# Patient Record
Sex: FEMALE | Race: WHITE | NOT HISPANIC OR LATINO | Employment: UNEMPLOYED | URBAN - METROPOLITAN AREA
[De-identification: names, ages, dates, MRNs, and addresses within clinical notes are randomized per-mention and may not be internally consistent; named-entity substitution may affect disease eponyms.]

---

## 2017-01-23 ENCOUNTER — ALLSCRIPTS OFFICE VISIT (OUTPATIENT)
Dept: OTHER | Facility: OTHER | Age: 62
End: 2017-01-23

## 2017-05-19 LAB
A/G RATIO (HISTORICAL): 1.8 (ref 1.2–2.2)
ALBUMIN SERPL BCP-MCNC: 4.2 G/DL (ref 3.5–5.5)
ALP SERPL-CCNC: 98 IU/L (ref 39–117)
ALT SERPL W P-5'-P-CCNC: 37 IU/L (ref 0–32)
AST SERPL W P-5'-P-CCNC: 30 IU/L (ref 0–40)
BILIRUB SERPL-MCNC: 0.4 MG/DL (ref 0–1.2)
BUN SERPL-MCNC: 20 MG/DL (ref 6–24)
BUN/CREA RATIO (HISTORICAL): 22 (ref 9–23)
CALCIUM SERPL-MCNC: 9.4 MG/DL (ref 8.7–10.2)
CHLORIDE SERPL-SCNC: 94 MMOL/L (ref 96–106)
CHOLEST SERPL-MCNC: 227 MG/DL (ref 100–199)
CHOLEST/HDLC SERPL: 3.3 RATIO UNITS (ref 0–4.4)
CO2 SERPL-SCNC: 25 MMOL/L (ref 18–29)
CREAT SERPL-MCNC: 0.93 MG/DL (ref 0.57–1)
EGFR AFRICAN AMERICAN (HISTORICAL): 78 ML/MIN/1.73
EGFR-AMERICAN CALC (HISTORICAL): 67 ML/MIN/1.73
GLUCOSE SERPL-MCNC: 91 MG/DL (ref 65–99)
HDLC SERPL-MCNC: 69 MG/DL
LDLC SERPL CALC-MCNC: 127 MG/DL (ref 0–99)
POTASSIUM SERPL-SCNC: 5 MMOL/L (ref 3.5–5.2)
SODIUM SERPL-SCNC: 135 MMOL/L (ref 134–144)
TOT. GLOBULIN, SERUM (HISTORICAL): 2.4 G/DL (ref 1.5–4.5)
TOTAL PROTEIN (HISTORICAL): 6.6 G/DL (ref 6–8.5)
TRIGL SERPL-MCNC: 153 MG/DL (ref 0–149)
VLDLC SERPL CALC-MCNC: 31 MG/DL (ref 5–40)

## 2017-05-23 ENCOUNTER — GENERIC CONVERSION - ENCOUNTER (OUTPATIENT)
Dept: OTHER | Facility: OTHER | Age: 62
End: 2017-05-23

## 2017-05-23 ENCOUNTER — ALLSCRIPTS OFFICE VISIT (OUTPATIENT)
Dept: OTHER | Facility: OTHER | Age: 62
End: 2017-05-23

## 2017-07-06 ENCOUNTER — HOSPITAL ENCOUNTER (OUTPATIENT)
Dept: RADIOLOGY | Age: 62
Discharge: HOME | End: 2017-07-06
Payer: COMMERCIAL

## 2017-07-06 DIAGNOSIS — Z12.31 ENCOUNTER FOR SCREENING MAMMOGRAM FOR MALIGNANT NEOPLASM OF BREAST: ICD-10-CM

## 2017-07-06 PROCEDURE — G0202 SCR MAMMO BI INCL CAD: HCPCS

## 2017-08-18 ENCOUNTER — GENERIC CONVERSION - ENCOUNTER (OUTPATIENT)
Dept: OTHER | Facility: OTHER | Age: 62
End: 2017-08-18

## 2017-08-29 ENCOUNTER — GENERIC CONVERSION - ENCOUNTER (OUTPATIENT)
Dept: OTHER | Facility: OTHER | Age: 62
End: 2017-08-29

## 2017-09-27 ENCOUNTER — GENERIC CONVERSION - ENCOUNTER (OUTPATIENT)
Dept: OTHER | Facility: OTHER | Age: 62
End: 2017-09-27

## 2017-10-10 ENCOUNTER — GENERIC CONVERSION - ENCOUNTER (OUTPATIENT)
Dept: FAMILY MEDICINE CLINIC | Facility: CLINIC | Age: 62
End: 2017-10-10

## 2017-10-10 ENCOUNTER — GENERIC CONVERSION - ENCOUNTER (OUTPATIENT)
Dept: OTHER | Facility: OTHER | Age: 62
End: 2017-10-10

## 2017-11-13 ENCOUNTER — GENERIC CONVERSION - ENCOUNTER (OUTPATIENT)
Dept: OTHER | Facility: OTHER | Age: 62
End: 2017-11-13

## 2017-11-21 LAB
A/G RATIO (HISTORICAL): 1.9 (ref 1.2–2.2)
ALBUMIN SERPL BCP-MCNC: 4.6 G/DL (ref 3.6–4.8)
ALP SERPL-CCNC: 75 IU/L (ref 39–117)
ALT SERPL W P-5'-P-CCNC: 35 IU/L (ref 0–32)
AST SERPL W P-5'-P-CCNC: 29 IU/L (ref 0–40)
BILIRUB SERPL-MCNC: 0.5 MG/DL (ref 0–1.2)
BUN SERPL-MCNC: 21 MG/DL (ref 8–27)
BUN/CREA RATIO (HISTORICAL): 21 (ref 12–28)
CALCIUM SERPL-MCNC: 10 MG/DL (ref 8.7–10.3)
CHLORIDE SERPL-SCNC: 98 MMOL/L (ref 96–106)
CHOLEST SERPL-MCNC: 256 MG/DL (ref 100–199)
CHOLEST/HDLC SERPL: 3.8 RATIO UNITS (ref 0–4.4)
CO2 SERPL-SCNC: 27 MMOL/L (ref 18–29)
CREAT SERPL-MCNC: 0.99 MG/DL (ref 0.57–1)
DEPRECATED RDW RBC AUTO: 12.3 % (ref 12.3–15.4)
EGFR AFRICAN AMERICAN (HISTORICAL): 72 ML/MIN/1.73
EGFR-AMERICAN CALC (HISTORICAL): 62 ML/MIN/1.73
GLUCOSE SERPL-MCNC: 97 MG/DL (ref 65–99)
HCT VFR BLD AUTO: 41.3 % (ref 34–46.6)
HDLC SERPL-MCNC: 67 MG/DL
HGB BLD-MCNC: 14.9 G/DL (ref 11.1–15.9)
LDLC SERPL CALC-MCNC: 158 MG/DL (ref 0–99)
MCH RBC QN AUTO: 31.2 PG (ref 26.6–33)
MCHC RBC AUTO-ENTMCNC: 36.1 G/DL (ref 31.5–35.7)
MCV RBC AUTO: 86 FL (ref 79–97)
PLATELET # BLD AUTO: 335 X10E3/UL (ref 150–379)
POTASSIUM SERPL-SCNC: 5.9 MMOL/L (ref 3.5–5.2)
RBC (HISTORICAL): 4.78 X10E6/UL (ref 3.77–5.28)
SODIUM SERPL-SCNC: 139 MMOL/L (ref 134–144)
TOT. GLOBULIN, SERUM (HISTORICAL): 2.4 G/DL (ref 1.5–4.5)
TOTAL PROTEIN (HISTORICAL): 7 G/DL (ref 6–8.5)
TRIGL SERPL-MCNC: 156 MG/DL (ref 0–149)
VLDLC SERPL CALC-MCNC: 31 MG/DL (ref 5–40)
WBC # BLD AUTO: 5.6 X10E3/UL (ref 3.4–10.8)

## 2017-11-22 LAB
25(OH)D3 SERPL-MCNC: 52.1 NG/ML (ref 30–100)
TSH SERPL DL<=0.05 MIU/L-ACNC: 1.11 UIU/ML (ref 0.45–4.5)

## 2017-12-05 ENCOUNTER — GENERIC CONVERSION - ENCOUNTER (OUTPATIENT)
Dept: FAMILY MEDICINE CLINIC | Facility: CLINIC | Age: 62
End: 2017-12-05

## 2018-01-10 NOTE — RESULT NOTES
Verified Results  (1) TSH [id number] 12:03PM [facility]     Test Name Result Flag Reference   TSH 1 330 uIU/mL  0 450-4 500     (1) T3 TOTAL [id number] 12:03PM [facility]     Test Name Result Flag Reference   Triiodothyronine (T3) 131 ng/dL       (LC) Thyroxine (T4) Free, Direct, S [id number] 12:03PM [facility]   A courtesy copy of this report has been sent to  the patient       Test Name Result Flag Reference   T4,Free(Direct) 1 01 ng/dL  0 82-1 77

## 2018-01-13 VITALS
SYSTOLIC BLOOD PRESSURE: 128 MMHG | BODY MASS INDEX: 27.2 KG/M2 | WEIGHT: 153.5 LBS | DIASTOLIC BLOOD PRESSURE: 70 MMHG | HEIGHT: 63 IN

## 2018-01-14 VITALS
SYSTOLIC BLOOD PRESSURE: 120 MMHG | BODY MASS INDEX: 27.29 KG/M2 | HEIGHT: 63 IN | WEIGHT: 154 LBS | DIASTOLIC BLOOD PRESSURE: 60 MMHG

## 2018-01-15 NOTE — PROGRESS NOTES
Assessment    1  Hyperlipidemia (272 4) (E78 5)   2  Hypertension (401 9) (I10)    Plan  Hyperlipidemia    · From  Colestipol HCl - 1 GM Oral Tablet TAKE 1 TABLET TWICE DAILY To  Colestipol HCl - 1 GM Oral Tablet TAKE 2 TABLETS DAILY  Hypertension    · (1) CBC/ PLT (NO DIFF); Status:Active; Requested for:64Zrt9024;    · (1) COMPREHENSIVE METABOLIC PANEL; Status:Active; Requested for:34Qic8907;    · (1) FOLATE; Status:Active; Requested LSH:[id number];    · (1) LIPID PANEL, FASTING; Status:Active; Requested for:34Qkv1411;    · (1) TSH; Status:Active; Requested for:47Swp0073;    · (1) VITAMIN D 25-HYDROXY; Status:Active; Requested XTW:46HMU2291;      Continue current therapy  She'll work harder on diet exercise  Followup in 4 months fasting lab work before that  Chief Complaint  REG F/U HTN, HYPERLIPIDS  REVIEW BW RESULTS FROM 1/21/16  History of Present Illness  She is feeling well doing well  She does sometimes forget to take the second cholesterol medicine  Review of Systems    Constitutional: No fever, no chills, feels well, no tiredness, no recent weight gain or weight loss  Eyes: No complaints of eye pain, no red eyes, no eyesight problems, no discharge, no dry eyes, no itching of eyes  ENT: no complaints of earache, no loss of hearing, no nose bleeds, no nasal discharge, no sore throat, no hoarseness  Cardiovascular: No complaints of slow heart rate, no fast heart rate, no chest pain, no palpitations, no leg claudication, no lower extremity edema  Respiratory: No complaints of shortness of breath, no wheezing, no cough, no SOB on exertion, no orthopnea, no PND  Gastrointestinal: No complaints of abdominal pain, no constipation, no nausea or vomiting, no diarrhea, no bloody stools  Genitourinary: No complaints of dysuria, no incontinence, no pelvic pain, no dysmenorrhea, no vaginal discharge or bleeding     Musculoskeletal: No complaints of arthralgias, no myalgias, no joint swelling or stiffness, no limb pain or swelling  Integumentary: No complaints of skin rash or lesions, no itching, no skin wounds, no breast pain or lump  Neurological: No complaints of headache, no confusion, no convulsions, no numbness, no dizziness or fainting, no tingling, no limb weakness, no difficulty walking  Psychiatric: Not suicidal, no sleep disturbance, no anxiety or depression, no change in personality, no emotional problems  Endocrine: No complaints of proptosis, no hot flashes, no muscle weakness, no deepening of the voice, no feelings of weakness  Hematologic/Lymphatic: No complaints of swollen glands, no swollen glands in the neck, does not bleed easily, does not bruise easily  Active Problems    1  Abnormal findings on diagnostic imaging of urinary organs (793 5) (R93 4)   2  Arthralgia of both knees (719 46) (M25 561,M25 562)   3  Endometriosis (617 9) (N80 9)   4  Hyperlipidemia (272 4) (E78 5)   5  Hypertension (401 9) (I10)   6  Panic Disorder Without Agoraphobia (300 01)   7  Visit for pre-operative examination (V72 84) (Q46 138)    Past Medical History    The active problems and past medical history were reviewed and updated today  Surgical History    1  History of Complete Colonoscopy   2  History of Hysterectomy    Family History    1  Family history of Hypertension (V17 49)    2  Family history of Acute Myocardial Infarction (V17 3)    3  Family history of Allergies   4  Family history of Anxiety (Symptom)   5  Family history of Cancer   6  Family history of Diabetes Mellitus (V18 0)   7  Family history of Heart Disease (V17 49)   8  Family history of Hypertension (V17 49)    Social History    · Being A Social Drinker   · Never A Smoker    Current Meds   1  Colestipol HCl - 1 GM Oral Tablet; TAKE 1 TABLET TWICE DAILY; Therapy: 52YWW1213 to ([facility])  Requested for: 0490 39 07 81; Last   Rx:29Oct2015 Ordered   2  Fish Oil CAPS;    Therapy: (Recorded:11Jan2013) to Recorded   3  Multiple Vitamin TABS; Therapy: (Recorded:11Jan2013) to Recorded   4  Turmeric CAPS; Therapy: (Recorded:29Oct2015) to Recorded   5  Vitamin D CAPS; take 400 iu daily; Therapy: (Demaris Ever) to Recorded    The medication list was reviewed and updated today  Allergies    1  No Known Drug Allergies    Vitals  Vital Signs [Data Includes: Current Encounter]    Recorded: 11MFO4123 29:00KQ   Systolic 644   Diastolic 90   Height 5 ft 3 in   Weight 155 lb    BMI Calculated 27 46   BSA Calculated 1 74     Physical Exam    Constitutional   General appearance: No acute distress, well appearing and well nourished  Eyes   Conjunctiva and lids: No swelling, erythema or discharge  Pupils and irises: Equal, round and reactive to light  Ears, Nose, Mouth, and Throat   External inspection of ears and nose: Normal     Otoscopic examination: Tympanic membranes translucent with normal light reflex  Canals patent without erythema  Nasal mucosa, septum, and turbinates: Normal without edema or erythema  Oropharynx: Normal with no erythema, edema, exudate or lesions  Pulmonary   Respiratory effort: No increased work of breathing or signs of respiratory distress  Auscultation of lungs: Clear to auscultation  Cardiovascular   Palpation of heart: Normal PMI, no thrills  Auscultation of heart: Normal rate and rhythm, normal S1 and S2, without murmurs  Examination of extremities for edema and/or varicosities: Normal     Carotid pulses: Normal     Abdomen   Abdomen: Non-tender, no masses  Liver and spleen: No hepatomegaly or splenomegaly  Lymphatic   Palpation of lymph nodes in neck: No lymphadenopathy  Musculoskeletal   Gait and station: Normal     Digits and nails: Normal without clubbing or cyanosis  Inspection/palpation of joints, bones, and muscles: Normal     Skin   Skin and subcutaneous tissue: Normal without rashes or lesions      Neurologic   Cranial nerves: Cranial nerves 2-12 intact  Reflexes: 2+ and symmetric  Sensation: No sensory loss      Psychiatric   Orientation to person, place, and time: Normal     Mood and affect: Normal          Signatures   Electronically signed by : Brice Laughlin DO; Jan 26 2016 10:18AM EST                       (Author)

## 2018-01-15 NOTE — MISCELLANEOUS
Assessment    1  Hypertension (401 9) (I10)   2  Transition of care   3  Seasonal allergic rhinitis (477 9) (J30 2)    Plan  Hypertension    · Lisinopril 10 MG Oral Tablet; TAKE 1 TABLET DAILY   Rx By: Hermelinda Obregon; Dispense: 90 Days ; #:90 Tablet; Refill: 3; For: Hypertension; MANUEL = N; Sent To: [facility]   · (1) T3 TOTAL; Status:Active; Requested for:11Mar2016;    Perform:[facility]; LMR:[id number];ZVETHRZ;  For:Hypertension; Ordered By:Sae Ward;   · (1) T4, FREE; Status:Active; Requested for:11Mar2016;    Perform:[facility]; KFU:[id number];QAIOZAR;  For:Hypertension; Ordered By:Cesar Ward;   · (1) TSH; Status:Active; Requested for:11Mar2016;    Perform:[facility]; Due:11Mar2017;Ordered;  For:Hypertension; Ordered By:Sae Ward;  Seasonal allergic rhinitis    · Budesonide 32 MCG/ACT Nasal Suspension; USE 2 SPRAYS IN EACH NOSTRIL  ONCE DAILY   Rx By: Hermelinda Obregon; Dispense: 0 Days ; #:1 X 8 6 GM Bottle; Refill: 6; For: Seasonal allergic rhinitis; MANUEL = N; Sent To: [address] [place] [address] [address]   · Follow-up PRN Evaluation and Treatment  Follow-up  Status: Complete  Done:  10UUD9613 11:56AM   Ordered; For: Seasonal allergic rhinitis; Ordered By: Hermelinda Obregon Performed:  Due: 82MCG6114   · How to use a nasal spray:; Status:Complete;   Done: 46VOA4854 11:56AM   Ordered; For:Seasonal allergic rhinitis; Ordered By:Cesar Ward; She'll continue with diet exercise  She can on her blood pressure at home  I'll call her with the thyroid results and the lipid panel when she has that done  She followup here in July or sooner if needed  Chief Complaint  Chief Complaint Free Text Note Form: PT HERE FOR MARIO  WAS IN [facility] FOR CHEST PAIN AND PALPITATIONS  SHE WAS ADMITTED 3/6/16 AND DISCHARGED 3/7/16  SHE STATES SHE FEELS BETTER, BUT SHE IS STILL VERY FATIGUED        History of Present Illness  TCM Communication [facility]: The patient is being contacted for 03/11/16 AT 11:15 KATERIN DURAND  Tiny Hernandez  She was hospitalized  [facility]  The date of admission: 03/07/2016, date of discharge: 03/07/2016  Diagnosis: CHEST PAIN  She was discharged to home  Symptoms: no headache  The patient is currently asymptomatic  Communication performed and completed by SK   HPI: She was recently discharged from [facility] after an observation stay for chest pain  She did have a echocardiogram and stress which were normal  She was found to be hypertensive while in the emergency room and discharged on lisinopril 10 mg daily  She does have a family history of thyroid disease  Review of Systems  Complete-Female:   Constitutional: No fever, no chills, feels well, no tiredness, no recent weight gain or weight loss  Eyes: No complaints of eye pain, no red eyes, no eyesight problems, no discharge, no dry eyes, no itching of eyes  ENT: no complaints of earache, no loss of hearing, no nose bleeds, no nasal discharge, no sore throat, no hoarseness  Cardiovascular: No complaints of slow heart rate, no fast heart rate, no chest pain, no palpitations, no leg claudication, no lower extremity edema  Respiratory: No complaints of shortness of breath, no wheezing, no cough, no SOB on exertion, no orthopnea, no PND  Gastrointestinal: No complaints of abdominal pain, no constipation, no nausea or vomiting, no diarrhea, no bloody stools  Genitourinary: No complaints of dysuria, no incontinence, no pelvic pain, no dysmenorrhea, no vaginal discharge or bleeding  Musculoskeletal: No complaints of arthralgias, no myalgias, no joint swelling or stiffness, no limb pain or swelling  Integumentary: No complaints of skin rash or lesions, no itching, no skin wounds, no breast pain or lump  Neurological: No complaints of headache, no confusion, no convulsions, no numbness, no dizziness or fainting, no tingling, no limb weakness, no difficulty walking     Psychiatric: Not suicidal, no sleep disturbance, no anxiety or depression, no change in personality, no emotional problems  Endocrine: No complaints of proptosis, no hot flashes, no muscle weakness, no deepening of the voice, no feelings of weakness  Hematologic/Lymphatic: No complaints of swollen glands, no swollen glands in the neck, does not bleed easily, does not bruise easily  Active Problems    1  Abnormal findings on diagnostic imaging of urinary organs (793 5) (R93 4)   2  Arthralgia of both knees (719 46) (M25 561,M25 562)   3  Endometriosis (617 9) (N80 9)   4  Hyperlipidemia (272 4) (E78 5)   5  Hypertension (401 9) (I10)   6  Panic Disorder Without Agoraphobia (300 01)   7  Visit for pre-operative examination (V72 84) (Z18 273)    Surgical History    1  History of Complete Colonoscopy   2  History of Hysterectomy  Surgical History Reviewed: The surgical history was reviewed and updated today  Family History    1  Family history of Hypertension (V17 49)    2  Family history of Acute Myocardial Infarction (V17 3)    3  Family history of Allergies   4  Family history of Anxiety (Symptom)   5  Family history of Cancer   6  Family history of Diabetes Mellitus (V18 0)   7  Family history of Heart Disease (V17 49)   8  Family history of Hypertension (V17 49)  Family History Reviewed: The family history was reviewed and updated today  Social History    · Being A Social Drinker   · Never A Smoker  Social History Reviewed: The social history was reviewed and updated today  Current Meds   1  Fish Oil CAPS; Therapy: (Recorded:11Jan2013) to Recorded   2  Lisinopril 10 MG Oral Tablet; TAKE 1 TABLET DAILY; Therapy: (Recorded:11Mar2016) to Recorded   3  Multiple Vitamin TABS; Therapy: (Recorded:11Jan2013) to Recorded   4  Vitamin D CAPS; take 400 iu daily; Therapy: (Naz Abad) to Recorded  Medication List Reviewed: The medication list was reviewed and updated today  Allergies    1   No Known Drug Allergies    Vitals  Signs Venda Herminio Includes: Current Encounter]   Recorded: 83SRT8271 56:07IV   Systolic: 003, LUE, Sitting  Diastolic: 78, LUE, Sitting  Height: 5 ft 3 in  Weight: 153 lb 6 oz  BMI Calculated: 27 17  BSA Calculated: 1 73    Physical Exam    Constitutional   General appearance: No acute distress, well appearing and well nourished  Eyes   Conjunctiva and lids: No swelling, erythema or discharge  Pupils and irises: Equal, round and reactive to light  Ears, Nose, Mouth, and Throat   External inspection of ears and nose: Normal     Otoscopic examination: Tympanic membranes translucent with normal light reflex  Canals patent without erythema  Nasal mucosa, septum, and turbinates: Normal without edema or erythema  Oropharynx: Normal with no erythema, edema, exudate or lesions  Pulmonary   Respiratory effort: No increased work of breathing or signs of respiratory distress  Auscultation of lungs: Clear to auscultation  Cardiovascular   Palpation of heart: Normal PMI, no thrills  Auscultation of heart: Normal rate and rhythm, normal S1 and S2, without murmurs  Examination of extremities for edema and/or varicosities: Normal     Carotid pulses: Normal     Abdomen   Abdomen: Non-tender, no masses  Liver and spleen: No hepatomegaly or splenomegaly  Lymphatic   Palpation of lymph nodes in neck: No lymphadenopathy  Musculoskeletal   Gait and station: Normal     Digits and nails: Normal without clubbing or cyanosis  Inspection/palpation of joints, bones, and muscles: Normal     Skin   Skin and subcutaneous tissue: Normal without rashes or lesions  Neurologic   Cranial nerves: Cranial nerves 2-12 intact  Reflexes: 2+ and symmetric  Sensation: No sensory loss      Psychiatric   Orientation to person, place, and time: Normal     Mood and affect: Normal          Future Appointments    Date/Time Provider Specialty Site   05/23/2016 09:00 AM Marva Page DO [facility] 8 CENTER     Signatures   Electronically signed by : Soraya Miranda, ; Mar  8 2016 11:21AM EST                       (Co-author)    Electronically signed by : Aurelio Reagan DO; Mar 11 2016 11:59AM EST                       (Author)

## 2018-01-17 NOTE — RESULT NOTES
Verified Results  (1) CBC/ PLT (NO DIFF) 26Apr2016 08:32AM [facility]     Test Name Result Flag Reference   WBC 4 2 x10E3/uL  3 4-10 8   RBC 4 97 x10E6/uL  3 77-5 28   Hemoglobin 15 1 g/dL  11 1-15 9   Hematocrit 43 9 %  34 0-46  6   MCV 88 fL  79-97   MCH 30 4 pg  26 6-33 0   MCHC 34 4 g/dL  31 5-35 7   RDW 13 1 %  12 3-15 4   Platelets 350 C06D1/SB  150-379     (1) COMPREHENSIVE METABOLIC PANEL 53CHS6276 96:79HC [facility]   A courtesy copy of this report has been sent to  the patient  Test Name Result Flag Reference   Glucose, Serum 93 mg/dL  65-99   BUN 15 mg/dL  6-24   Creatinine, Serum 0 92 mg/dL  0 57-1 00   eGFR If NonAfricn Am 69 mL/min/1 73  >59   eGFR If Africn Am 79 mL/min/1 73  >59   BUN/Creatinine Ratio 16  9-23   Sodium, Serum 137 mmol/L  134-144   Potassium, Serum 4 7 mmol/L  3 5-5 2   Chloride, Serum 98 mmol/L     Carbon Dioxide, Total 23 mmol/L  18-29   Calcium, Serum 9 4 mg/dL  8 7-10 2   Protein, Total, Serum 6 7 g/dL  6 0-8 5   Albumin, Serum 4 3 g/dL  3 5-5 5   Globulin, Total 2 4 g/dL  1 5-4 5   A/G Ratio 1 8  1 1-2 5   Bilirubin, Total 0 5 mg/dL  0 0-1 2   Alkaline Phosphatase, S 84 IU/L     AST (SGOT) 30 IU/L  0-40   ALT (SGPT) 29 IU/L  0-32     (1) LIPID PANEL, FASTING 26Apr2016 08:32AM [facility]     Test Name Result Flag Reference   Cholesterol, Total 238 mg/dL H 100-199   Triglycerides 141 mg/dL  0-149   HDL Cholesterol 68 mg/dL  >39   According to ATP-III Guidelines, HDL-C >59 mg/dL is considered a  negative risk factor for CHD  VLDL Cholesterol [place] 28 mg/dL  5-40   LDL Cholesterol Calc 142 mg/dL H 0-99   T  Chol/HDL Ratio 3 5 ratio units  0 0-4 4   T  Chol/HDL Ratio                                                             Men  Women                                               1/2 Avg  Risk  3 4    3 3                                                   Avg Risk  5 0    4 4                                                2X Avg  Risk  9 6    7 1 3X Avg  Risk 23 4   11 0     (1) VITAMIN D 25-HYDROXY 26Apr2016 08:28AM Karly Maguire     Test Name Result Flag Reference   Vitamin D, 25-Hydroxy 37 3 ng/mL  30 0-100 0   Vitamin D deficiency has been defined by the [address] practice guideline as a  level of serum 25-OH vitamin D less than 20 ng/mL (1,2)  The Endocrine Society went on to further define vitamin D  insufficiency as a level between 21 and 29 ng/mL (2)  1  IOM ([employer])  2010  Dietary reference     intakes for calcium and D  [facility]: The     [employer]  2  Gurpreet MF, Ravindra RUBIO, Jake MONROY, et al      Evaluation, treatment, and prevention of vitamin D     deficiency: an Endocrine Society clinical practice     guideline  JCEM  2011 Jul; 96(7):1911-30  (1) FOLATE 26Apr2016 08:28AM Karly Maguire   A courtesy copy of this report has been sent to  the patient  Test Name Result Flag Reference   Folate (Folic Acid), Serum 18 8 ng/mL  >3 0   A serum folate concentration of less than 3 1 ng/mL is  considered to represent clinical deficiency

## 2018-04-25 PROCEDURE — G0145 SCR C/V CYTO,THINLAYER,RESCR: HCPCS | Performed by: OBSTETRICS & GYNECOLOGY

## 2018-04-25 PROCEDURE — 87624 HPV HI-RISK TYP POOLED RSLT: CPT | Performed by: OBSTETRICS & GYNECOLOGY

## 2018-04-26 DIAGNOSIS — I10 ESSENTIAL HYPERTENSION: Primary | ICD-10-CM

## 2018-04-27 ENCOUNTER — LAB REQUISITION (OUTPATIENT)
Dept: LAB | Facility: HOSPITAL | Age: 63
End: 2018-04-27
Payer: COMMERCIAL

## 2018-04-27 DIAGNOSIS — Z11.51 ENCOUNTER FOR SCREENING FOR HUMAN PAPILLOMAVIRUS (HPV): ICD-10-CM

## 2018-04-27 DIAGNOSIS — Z12.72 ENCOUNTER FOR SCREENING FOR MALIGNANT NEOPLASM OF VAGINA: ICD-10-CM

## 2018-04-27 RX ORDER — LISINOPRIL 10 MG/1
TABLET ORAL
Qty: 90 TABLET | Refills: 1 | Status: SHIPPED | OUTPATIENT
Start: 2018-04-27 | End: 2018-07-25 | Stop reason: SDUPTHER

## 2018-04-30 LAB — HPV RRNA GENITAL QL NAA+PROBE: NORMAL

## 2018-05-01 LAB
LAB AP GYN PRIMARY INTERPRETATION: NORMAL
Lab: NORMAL

## 2018-07-13 ENCOUNTER — HOSPITAL ENCOUNTER (OUTPATIENT)
Dept: RADIOLOGY | Age: 63
Discharge: HOME | End: 2018-07-13
Payer: COMMERCIAL

## 2018-07-13 DIAGNOSIS — Z12.31 ENCOUNTER FOR SCREENING MAMMOGRAM FOR MALIGNANT NEOPLASM OF BREAST: ICD-10-CM

## 2018-07-13 PROCEDURE — 77067 SCR MAMMO BI INCL CAD: CPT

## 2018-07-25 ENCOUNTER — OFFICE VISIT (OUTPATIENT)
Dept: FAMILY MEDICINE CLINIC | Facility: CLINIC | Age: 63
End: 2018-07-25
Payer: COMMERCIAL

## 2018-07-25 VITALS
OXYGEN SATURATION: 95 % | DIASTOLIC BLOOD PRESSURE: 74 MMHG | SYSTOLIC BLOOD PRESSURE: 130 MMHG | WEIGHT: 151 LBS | HEIGHT: 63 IN | BODY MASS INDEX: 26.75 KG/M2 | RESPIRATION RATE: 12 BRPM | HEART RATE: 74 BPM

## 2018-07-25 DIAGNOSIS — M25.552 PAIN OF BOTH HIP JOINTS: ICD-10-CM

## 2018-07-25 DIAGNOSIS — E78.2 MIXED HYPERLIPIDEMIA: ICD-10-CM

## 2018-07-25 DIAGNOSIS — I10 ESSENTIAL HYPERTENSION: Primary | ICD-10-CM

## 2018-07-25 DIAGNOSIS — M25.551 PAIN OF BOTH HIP JOINTS: ICD-10-CM

## 2018-07-25 PROBLEM — M54.42 CHRONIC LEFT-SIDED LOW BACK PAIN WITH LEFT-SIDED SCIATICA: Status: ACTIVE | Noted: 2017-05-23

## 2018-07-25 PROBLEM — G89.29 CHRONIC LEFT-SIDED LOW BACK PAIN WITH LEFT-SIDED SCIATICA: Status: ACTIVE | Noted: 2017-05-23

## 2018-07-25 PROCEDURE — 3008F BODY MASS INDEX DOCD: CPT | Performed by: FAMILY MEDICINE

## 2018-07-25 PROCEDURE — 3078F DIAST BP <80 MM HG: CPT | Performed by: FAMILY MEDICINE

## 2018-07-25 PROCEDURE — 3075F SYST BP GE 130 - 139MM HG: CPT | Performed by: FAMILY MEDICINE

## 2018-07-25 PROCEDURE — 99214 OFFICE O/P EST MOD 30 MIN: CPT | Performed by: FAMILY MEDICINE

## 2018-07-25 RX ORDER — LISINOPRIL 10 MG/1
10 TABLET ORAL DAILY
Qty: 90 TABLET | Refills: 1 | Status: SHIPPED | OUTPATIENT
Start: 2018-07-25 | End: 2019-01-10 | Stop reason: SDUPTHER

## 2018-07-25 RX ORDER — MULTIVITAMIN WITH IRON
1 TABLET ORAL DAILY
COMMUNITY

## 2018-07-25 RX ORDER — BIOTIN 5 MG
1 TABLET ORAL DAILY
COMMUNITY
Start: 2018-04-25 | End: 2021-08-18

## 2018-07-25 RX ORDER — ESTRADIOL 1 MG/1
1 TABLET ORAL DAILY
COMMUNITY
Start: 2017-01-23 | End: 2021-08-18

## 2018-07-25 NOTE — PROGRESS NOTES
Assessment/Plan:    Continue current therapy  Recommend staying active diet and exercise  I will call with the lab test results  Follow-up here in 6 months or as needed  Diagnoses and all orders for this visit:    Essential hypertension  -     lisinopril (ZESTRIL) 10 mg tablet; Take 1 tablet (10 mg total) by mouth daily  -     CBC  -     Comprehensive metabolic panel  -     TSH, 3rd generation  -     Lipid panel    Mixed hyperlipidemia  -     Lipid panel    Pain of both hip joints  -     Lyme Antibody Profile with reflex to WB; Future    Other orders  -     Biotin 5 MG TABS; Take 1 tablet by mouth daily  -     Cholecalciferol (VITAMIN D3 PO); Take 2,000 Units by mouth daily  -     estradiol (ESTRACE) 1 mg tablet; Take 1 tablet by mouth daily  -     Omega-3 Fatty Acids (FISH OIL) 645 MG CAPS; Take 1 capsule by mouth daily  -     Multiple Vitamins tablet; Take 1 tablet by mouth daily          Subjective:      Patient ID: Macho Perez is a 63 y o  female  She is feeling well  No complaints  The following portions of the patient's history were reviewed and updated as appropriate: allergies, current medications, past family history, past medical history, past social history, past surgical history and problem list     Review of Systems   Constitutional: Negative  HENT: Negative  Eyes: Negative  Respiratory: Negative  Cardiovascular: Negative  Gastrointestinal: Negative  Endocrine: Negative  Genitourinary: Negative  Musculoskeletal: Negative  Skin: Negative  Allergic/Immunologic: Negative  Neurological: Negative  Hematological: Negative  Psychiatric/Behavioral: Negative  All other systems reviewed and are negative          Objective:      /74 (BP Location: Right arm, Patient Position: Sitting, Cuff Size: Adult)   Pulse 74   Resp 12   Ht 5' 2 5" (1 588 m)   Wt 68 5 kg (151 lb)   SpO2 95%   BMI 27 18 kg/m²          Physical Exam   Constitutional: She is oriented to person, place, and time  She appears well-developed and well-nourished  HENT:   Head: Normocephalic and atraumatic  Right Ear: External ear normal    Left Ear: External ear normal    Nose: Nose normal    Mouth/Throat: Oropharynx is clear and moist    Eyes: Conjunctivae and EOM are normal  Pupils are equal, round, and reactive to light  Neck: Normal range of motion  Neck supple  Cardiovascular: Normal rate, regular rhythm and normal heart sounds  Pulmonary/Chest: Effort normal and breath sounds normal    Abdominal: Soft  Bowel sounds are normal    Musculoskeletal: Normal range of motion  Neurological: She is alert and oriented to person, place, and time  She has normal reflexes  Skin: Skin is warm and dry  Psychiatric: She has a normal mood and affect  Her behavior is normal    Nursing note and vitals reviewed

## 2018-07-26 ENCOUNTER — APPOINTMENT (OUTPATIENT)
Dept: LAB | Age: 63
End: 2018-07-26
Payer: COMMERCIAL

## 2018-07-26 DIAGNOSIS — M25.551 PAIN OF BOTH HIP JOINTS: ICD-10-CM

## 2018-07-26 DIAGNOSIS — M25.552 PAIN OF BOTH HIP JOINTS: ICD-10-CM

## 2018-07-26 LAB
ALBUMIN SERPL BCP-MCNC: 3.7 G/DL (ref 3.5–5)
ALP SERPL-CCNC: 73 U/L (ref 46–116)
ALT SERPL W P-5'-P-CCNC: 25 U/L (ref 12–78)
ANION GAP SERPL CALCULATED.3IONS-SCNC: 6 MMOL/L (ref 4–13)
AST SERPL W P-5'-P-CCNC: 19 U/L (ref 5–45)
BILIRUB SERPL-MCNC: 0.65 MG/DL (ref 0.2–1)
BUN SERPL-MCNC: 14 MG/DL (ref 5–25)
CALCIUM SERPL-MCNC: 9 MG/DL (ref 8.3–10.1)
CHLORIDE SERPL-SCNC: 103 MMOL/L (ref 100–108)
CHOLEST SERPL-MCNC: 216 MG/DL (ref 50–200)
CO2 SERPL-SCNC: 29 MMOL/L (ref 21–32)
CREAT SERPL-MCNC: 1.03 MG/DL (ref 0.6–1.3)
ERYTHROCYTE [DISTWIDTH] IN BLOOD BY AUTOMATED COUNT: 12 % (ref 11.6–15.1)
GFR SERPL CREATININE-BSD FRML MDRD: 59 ML/MIN/1.73SQ M
GLUCOSE P FAST SERPL-MCNC: 103 MG/DL (ref 65–99)
HCT VFR BLD AUTO: 45.3 % (ref 34.8–46.1)
HDLC SERPL-MCNC: 66 MG/DL (ref 40–60)
HGB BLD-MCNC: 14.8 G/DL (ref 11.5–15.4)
LDLC SERPL CALC-MCNC: 135 MG/DL (ref 0–100)
MCH RBC QN AUTO: 30.1 PG (ref 26.8–34.3)
MCHC RBC AUTO-ENTMCNC: 32.7 G/DL (ref 31.4–37.4)
MCV RBC AUTO: 92 FL (ref 82–98)
NONHDLC SERPL-MCNC: 150 MG/DL
PLATELET # BLD AUTO: 348 THOUSANDS/UL (ref 149–390)
PMV BLD AUTO: 9.8 FL (ref 8.9–12.7)
POTASSIUM SERPL-SCNC: 3.7 MMOL/L (ref 3.5–5.3)
PROT SERPL-MCNC: 7 G/DL (ref 6.4–8.2)
RBC # BLD AUTO: 4.92 MILLION/UL (ref 3.81–5.12)
SODIUM SERPL-SCNC: 138 MMOL/L (ref 136–145)
TRIGL SERPL-MCNC: 75 MG/DL
TSH SERPL DL<=0.05 MIU/L-ACNC: 1.95 UIU/ML (ref 0.36–3.74)
WBC # BLD AUTO: 6.9 THOUSAND/UL (ref 4.31–10.16)

## 2018-07-26 PROCEDURE — 36415 COLL VENOUS BLD VENIPUNCTURE: CPT | Performed by: FAMILY MEDICINE

## 2018-07-26 PROCEDURE — 86618 LYME DISEASE ANTIBODY: CPT

## 2018-07-26 PROCEDURE — 80061 LIPID PANEL: CPT | Performed by: FAMILY MEDICINE

## 2018-07-26 PROCEDURE — 80053 COMPREHEN METABOLIC PANEL: CPT | Performed by: FAMILY MEDICINE

## 2018-07-26 PROCEDURE — 84443 ASSAY THYROID STIM HORMONE: CPT | Performed by: FAMILY MEDICINE

## 2018-07-26 PROCEDURE — 85027 COMPLETE CBC AUTOMATED: CPT | Performed by: FAMILY MEDICINE

## 2018-07-27 LAB
B BURGDOR IGG SER IA-ACNC: 0.24
B BURGDOR IGM SER IA-ACNC: 0.14

## 2019-01-02 ENCOUNTER — APPOINTMENT (RX ONLY)
Dept: URBAN - METROPOLITAN AREA CLINIC 44 | Facility: CLINIC | Age: 64
Setting detail: DERMATOLOGY
End: 2019-01-02

## 2019-01-02 ENCOUNTER — APPOINTMENT (RX ONLY)
Dept: URBAN - METROPOLITAN AREA CLINIC 45 | Facility: CLINIC | Age: 64
Setting detail: DERMATOLOGY
End: 2019-01-02

## 2019-01-02 DIAGNOSIS — L82.1 OTHER SEBORRHEIC KERATOSIS: ICD-10-CM

## 2019-01-02 DIAGNOSIS — L81.4 OTHER MELANIN HYPERPIGMENTATION: ICD-10-CM

## 2019-01-02 DIAGNOSIS — D485 NEOPLASM OF UNCERTAIN BEHAVIOR OF SKIN: ICD-10-CM

## 2019-01-02 DIAGNOSIS — D18.0 HEMANGIOMA: ICD-10-CM

## 2019-01-02 DIAGNOSIS — L57.3 POIKILODERMA OF CIVATTE: ICD-10-CM

## 2019-01-02 DIAGNOSIS — D22 MELANOCYTIC NEVI: ICD-10-CM

## 2019-01-02 DIAGNOSIS — L85.3 XEROSIS CUTIS: ICD-10-CM

## 2019-01-02 PROBLEM — L23.7 ALLERGIC CONTACT DERMATITIS DUE TO PLANTS, EXCEPT FOOD: Status: ACTIVE | Noted: 2019-01-02

## 2019-01-02 PROBLEM — D22.72 MELANOCYTIC NEVI OF LEFT LOWER LIMB, INCLUDING HIP: Status: ACTIVE | Noted: 2019-01-02

## 2019-01-02 PROBLEM — D48.5 NEOPLASM OF UNCERTAIN BEHAVIOR OF SKIN: Status: ACTIVE | Noted: 2019-01-02

## 2019-01-02 PROBLEM — D22.62 MELANOCYTIC NEVI OF LEFT UPPER LIMB, INCLUDING SHOULDER: Status: ACTIVE | Noted: 2019-01-02

## 2019-01-02 PROBLEM — E78.5 HYPERLIPIDEMIA, UNSPECIFIED: Status: ACTIVE | Noted: 2019-01-02

## 2019-01-02 PROBLEM — D18.01 HEMANGIOMA OF SKIN AND SUBCUTANEOUS TISSUE: Status: ACTIVE | Noted: 2019-01-02

## 2019-01-02 PROBLEM — D22.61 MELANOCYTIC NEVI OF RIGHT UPPER LIMB, INCLUDING SHOULDER: Status: ACTIVE | Noted: 2019-01-02

## 2019-01-02 PROBLEM — D22.5 MELANOCYTIC NEVI OF TRUNK: Status: ACTIVE | Noted: 2019-01-02

## 2019-01-02 PROBLEM — D22.39 MELANOCYTIC NEVI OF OTHER PARTS OF FACE: Status: ACTIVE | Noted: 2019-01-02

## 2019-01-02 PROBLEM — L29.8 OTHER PRURITUS: Status: ACTIVE | Noted: 2019-01-02

## 2019-01-02 PROBLEM — L55.1 SUNBURN OF SECOND DEGREE: Status: ACTIVE | Noted: 2019-01-02

## 2019-01-02 PROBLEM — I10 ESSENTIAL (PRIMARY) HYPERTENSION: Status: ACTIVE | Noted: 2019-01-02

## 2019-01-02 PROBLEM — D22.71 MELANOCYTIC NEVI OF RIGHT LOWER LIMB, INCLUDING HIP: Status: ACTIVE | Noted: 2019-01-02

## 2019-01-02 PROCEDURE — 11102 TANGNTL BX SKIN SINGLE LES: CPT

## 2019-01-02 PROCEDURE — 99203 OFFICE O/P NEW LOW 30 MIN: CPT | Mod: 25

## 2019-01-02 PROCEDURE — ? BIOPSY BY SHAVE METHOD

## 2019-01-02 PROCEDURE — ? COUNSELING

## 2019-01-02 PROCEDURE — ? RECOMMENDATIONS

## 2019-01-02 ASSESSMENT — LOCATION DETAILED DESCRIPTION DERM
LOCATION DETAILED: RIGHT DISTAL DORSAL FOREARM
LOCATION DETAILED: RIGHT PROXIMAL PRETIBIAL REGION
LOCATION DETAILED: LEFT DISTAL DORSAL FOREARM
LOCATION DETAILED: LEFT PROXIMAL DORSAL FOREARM
LOCATION DETAILED: RIGHT DISTAL DORSAL FOREARM
LOCATION DETAILED: RIGHT PROXIMAL DORSAL FOREARM
LOCATION DETAILED: LEFT PROXIMAL PRETIBIAL REGION
LOCATION DETAILED: EPIGASTRIC SKIN
LOCATION DETAILED: INFERIOR MID FOREHEAD
LOCATION DETAILED: LEFT PROXIMAL PRETIBIAL REGION
LOCATION DETAILED: RIGHT ANTERIOR PROXIMAL THIGH
LOCATION DETAILED: RIGHT PROXIMAL DORSAL FOREARM
LOCATION DETAILED: RIGHT LATERAL SHOULDER
LOCATION DETAILED: RIGHT PROXIMAL PRETIBIAL REGION
LOCATION DETAILED: LEFT DISTAL PRETIBIAL REGION
LOCATION DETAILED: SUPERIOR THORACIC SPINE
LOCATION DETAILED: INFERIOR MID FOREHEAD
LOCATION DETAILED: LEFT ANTERIOR DISTAL THIGH
LOCATION DETAILED: UPPER STERNUM
LOCATION DETAILED: RIGHT INFERIOR ANTERIOR NECK
LOCATION DETAILED: LEFT DISTAL PRETIBIAL REGION
LOCATION DETAILED: RIGHT INFERIOR ANTERIOR NECK
LOCATION DETAILED: RIGHT LATERAL SHOULDER
LOCATION DETAILED: RIGHT DISTAL PRETIBIAL REGION
LOCATION DETAILED: UPPER STERNUM
LOCATION DETAILED: LEFT PROXIMAL DORSAL FOREARM
LOCATION DETAILED: EPIGASTRIC SKIN
LOCATION DETAILED: RIGHT ANTERIOR PROXIMAL THIGH
LOCATION DETAILED: LEFT DISTAL DORSAL FOREARM
LOCATION DETAILED: LEFT ANTERIOR DISTAL THIGH
LOCATION DETAILED: RIGHT DISTAL PRETIBIAL REGION
LOCATION DETAILED: SUPERIOR THORACIC SPINE

## 2019-01-02 ASSESSMENT — LOCATION SIMPLE DESCRIPTION DERM
LOCATION SIMPLE: UPPER BACK
LOCATION SIMPLE: RIGHT FOREARM
LOCATION SIMPLE: UPPER BACK
LOCATION SIMPLE: RIGHT THIGH
LOCATION SIMPLE: RIGHT ANTERIOR NECK
LOCATION SIMPLE: RIGHT THIGH
LOCATION SIMPLE: LEFT THIGH
LOCATION SIMPLE: RIGHT ANTERIOR NECK
LOCATION SIMPLE: INFERIOR FOREHEAD
LOCATION SIMPLE: ABDOMEN
LOCATION SIMPLE: ABDOMEN
LOCATION SIMPLE: LEFT FOREARM
LOCATION SIMPLE: LEFT THIGH
LOCATION SIMPLE: RIGHT SHOULDER
LOCATION SIMPLE: CHEST
LOCATION SIMPLE: RIGHT FOREARM
LOCATION SIMPLE: RIGHT PRETIBIAL REGION
LOCATION SIMPLE: CHEST
LOCATION SIMPLE: LEFT PRETIBIAL REGION
LOCATION SIMPLE: LEFT PRETIBIAL REGION
LOCATION SIMPLE: LEFT FOREARM
LOCATION SIMPLE: RIGHT PRETIBIAL REGION
LOCATION SIMPLE: RIGHT SHOULDER
LOCATION SIMPLE: INFERIOR FOREHEAD

## 2019-01-02 ASSESSMENT — LOCATION ZONE DERM
LOCATION ZONE: ARM
LOCATION ZONE: NECK
LOCATION ZONE: TRUNK
LOCATION ZONE: LEG
LOCATION ZONE: LEG
LOCATION ZONE: NECK
LOCATION ZONE: FACE
LOCATION ZONE: ARM
LOCATION ZONE: FACE
LOCATION ZONE: TRUNK

## 2019-01-02 NOTE — PROCEDURE: BIOPSY BY SHAVE METHOD
Billing Type: Third-Party Bill
Electrodesiccation And Curettage Text: The wound bed was treated with electrodesiccation and curettage after the biopsy was performed.
Type Of Destruction Used: Electrodesiccation and Curettage
Biopsy Type: H and E
Depth Of Biopsy: dermis
Bill 25241 For Specimen Handling/Conveyance To Laboratory?: no
X Size Of Lesion In Cm: 0
Silver Nitrate Text: The wound bed was treated with silver nitrate after the biopsy was performed.
Was A Bandage Applied: Yes
Dressing: bandage
Post-Care Instructions: I reviewed with the patient in detail post-care instructions. Patient is to keep the biopsy site dry overnight, and then apply bacitracin twice daily until healed. Patient may apply hydrogen peroxide soaks to remove any crusting.
Anesthesia Volume In Cc: 1
Curettage Text: The wound bed was treated with curettage after the biopsy was performed.
Notification Instructions: Patient will be notified of biopsy results. However, patient instructed to call the office if not contacted within 2 weeks.
Lab: -[id number]
Detail Level: Detailed
Biopsy Method: Dermablade
Hemostasis: Drysol
Electrodesiccation Text: The wound bed was treated with electrodesiccation after the biopsy was performed.
Wound Care: Vaseline
Lab Facility: [id number]
Anesthesia Type: 1% lidocaine with epinephrine
Consent: Written consent was obtained and risks were reviewed including but not limited to scarring, infection, bleeding, scabbing, incomplete removal, nerve damage and allergy to anesthesia.
Size Of Lesion In Cm: 0.2
Cryotherapy Text: The wound bed was treated with cryotherapy after the biopsy was performed.

## 2019-01-02 NOTE — PROCEDURE: MIPS QUALITY
Quality 226: Preventive Care And Screening: Tobacco Use: Screening And Cessation Intervention: Patient screened for tobacco use and is an ex/non-smoker
Detail Level: Detailed
Quality 110: Preventive Care And Screening: Influenza Immunization: Influenza Immunization Administered during Influenza season
Quality 130: Documentation Of Current Medications In The Medical Record: Current Medications Documented
Quality 431: Preventive Care And Screening: Unhealthy Alcohol Use - Screening: Patient screened for unhealthy alcohol use using a single question and scores less than 2 times per year

## 2019-01-02 NOTE — PROCEDURE: MIPS QUALITY
Quality 110: Preventive Care And Screening: Influenza Immunization: Influenza Immunization Administered during Influenza season
Quality 431: Preventive Care And Screening: Unhealthy Alcohol Use - Screening: Patient screened for unhealthy alcohol use using a single question and scores less than 2 times per year
Quality 226: Preventive Care And Screening: Tobacco Use: Screening And Cessation Intervention: Patient screened for tobacco use and is an ex/non-smoker
Detail Level: Detailed
Quality 130: Documentation Of Current Medications In The Medical Record: Current Medications Documented

## 2019-01-02 NOTE — PROCEDURE: BIOPSY BY SHAVE METHOD
Additional Anesthesia Volume In Cc (Will Not Render If 0): 0
Bill 88010 For Specimen Handling/Conveyance To Laboratory?: no
Biopsy Method: Dermablade
Electrodesiccation Text: The wound bed was treated with electrodesiccation after the biopsy was performed.
Type Of Destruction Used: Electrodesiccation and Curettage
Post-Care Instructions: I reviewed with the patient in detail post-care instructions. Patient is to keep the biopsy site dry overnight, and then apply bacitracin twice daily until healed. Patient may apply hydrogen peroxide soaks to remove any crusting.
Anesthesia Type: 1% lidocaine with epinephrine
Render Post-Care Instructions In Note?: yes
Cryotherapy Text: The wound bed was treated with cryotherapy after the biopsy was performed.
Billing Type: Third-Party Bill
Size Of Lesion In Cm: 0.2
Curettage Text: The wound bed was treated with curettage after the biopsy was performed.
Biopsy Type: H and E
Dressing: bandage
Electrodesiccation And Curettage Text: The wound bed was treated with electrodesiccation and curettage after the biopsy was performed.
Hemostasis: Drysol
Anesthesia Volume In Cc: 1
Silver Nitrate Text: The wound bed was treated with silver nitrate after the biopsy was performed.
Depth Of Biopsy: dermis
Wound Care: Vaseline
Consent: Written consent was obtained and risks were reviewed including but not limited to scarring, infection, bleeding, scabbing, incomplete removal, nerve damage and allergy to anesthesia.
Detail Level: Detailed
Notification Instructions: Patient will be notified of biopsy results. However, patient instructed to call the office if not contacted within 2 weeks.

## 2019-01-10 ENCOUNTER — OFFICE VISIT (OUTPATIENT)
Dept: FAMILY MEDICINE CLINIC | Facility: CLINIC | Age: 64
End: 2019-01-10
Payer: COMMERCIAL

## 2019-01-10 VITALS
DIASTOLIC BLOOD PRESSURE: 70 MMHG | HEIGHT: 63 IN | SYSTOLIC BLOOD PRESSURE: 110 MMHG | WEIGHT: 157.4 LBS | BODY MASS INDEX: 27.89 KG/M2

## 2019-01-10 DIAGNOSIS — Z23 ENCOUNTER FOR VACCINATION: ICD-10-CM

## 2019-01-10 DIAGNOSIS — E78.2 MIXED HYPERLIPIDEMIA: ICD-10-CM

## 2019-01-10 DIAGNOSIS — I10 ESSENTIAL HYPERTENSION: Primary | ICD-10-CM

## 2019-01-10 PROCEDURE — 3078F DIAST BP <80 MM HG: CPT | Performed by: FAMILY MEDICINE

## 2019-01-10 PROCEDURE — 99214 OFFICE O/P EST MOD 30 MIN: CPT | Performed by: FAMILY MEDICINE

## 2019-01-10 PROCEDURE — 3008F BODY MASS INDEX DOCD: CPT | Performed by: FAMILY MEDICINE

## 2019-01-10 PROCEDURE — 3074F SYST BP LT 130 MM HG: CPT | Performed by: FAMILY MEDICINE

## 2019-01-10 PROCEDURE — 1036F TOBACCO NON-USER: CPT | Performed by: FAMILY MEDICINE

## 2019-01-10 RX ORDER — LISINOPRIL 10 MG/1
10 TABLET ORAL DAILY
Qty: 90 TABLET | Refills: 1 | Status: SHIPPED | OUTPATIENT
Start: 2019-01-10 | End: 2019-05-21 | Stop reason: SDUPTHER

## 2019-01-10 NOTE — PROGRESS NOTES
Assessment/Plan:    Continue current therapy  I will call with the lab test results  Follow-up in 6 months or as needed  Diagnoses and all orders for this visit:    Essential hypertension  -     lisinopril (ZESTRIL) 10 mg tablet; Take 1 tablet (10 mg total) by mouth daily  -     Comprehensive metabolic panel  -     CBC and differential    Encounter for vaccination    Mixed hyperlipidemia  -     Lipid panel    Other orders  -     Cancel: Flu vaccine greater than or equal to 4yo preservative free IM          Subjective:      Patient ID: Vielka Fierro is a 66 y o  female  Patient presents with: Follow-up: Reg F/U HTN, Hyperlipids  No other questions or concerns  Medication Refill: Lisinopril to [facility] - awaiting approval   Lab Slips: Pt is requesting to have lab orders  The following portions of the patient's history were reviewed and updated as appropriate: allergies, current medications, past family history, past medical history, past social history, past surgical history and problem list     Review of Systems   Constitutional: Negative  HENT: Negative  Eyes: Negative  Respiratory: Negative  Cardiovascular: Negative  Gastrointestinal: Negative  Endocrine: Negative  Genitourinary: Negative  Musculoskeletal: Negative  Skin: Negative  Allergic/Immunologic: Negative  Neurological: Negative  Hematological: Negative  Psychiatric/Behavioral: Negative  All other systems reviewed and are negative  Objective:      /70 (BP Location: Right arm, Patient Position: Sitting, Cuff Size: Standard)   Ht 5' 2 5" (1 588 m)   Wt 71 4 kg (157 lb 6 4 oz)   BMI 28 33 kg/m²          Physical Exam   Constitutional: She is oriented to person, place, and time  She appears well-developed and well-nourished  HENT:   Head: Normocephalic and atraumatic     Right Ear: External ear normal    Left Ear: External ear normal    Nose: Nose normal    Mouth/Throat: Oropharynx is clear and moist    Eyes: Pupils are equal, round, and reactive to light  Conjunctivae and EOM are normal    Neck: Normal range of motion  Neck supple  Cardiovascular: Normal rate, regular rhythm and normal heart sounds  Pulmonary/Chest: Effort normal and breath sounds normal    Abdominal: Soft  Bowel sounds are normal    Musculoskeletal: Normal range of motion  Neurological: She is alert and oriented to person, place, and time  She has normal reflexes  Skin: Skin is warm and dry  Psychiatric: She has a normal mood and affect  Her behavior is normal    Nursing note and vitals reviewed

## 2019-01-11 ENCOUNTER — APPOINTMENT (OUTPATIENT)
Dept: LAB | Age: 64
End: 2019-01-11
Payer: COMMERCIAL

## 2019-01-11 LAB
ALBUMIN SERPL BCP-MCNC: 3.7 G/DL (ref 3.5–5)
ALP SERPL-CCNC: 108 U/L (ref 46–116)
ALT SERPL W P-5'-P-CCNC: 53 U/L (ref 12–78)
ANION GAP SERPL CALCULATED.3IONS-SCNC: 7 MMOL/L (ref 4–13)
AST SERPL W P-5'-P-CCNC: 37 U/L (ref 5–45)
BASOPHILS # BLD AUTO: 0.04 THOUSANDS/ΜL (ref 0–0.1)
BASOPHILS NFR BLD AUTO: 1 % (ref 0–1)
BILIRUB SERPL-MCNC: 0.54 MG/DL (ref 0.2–1)
BUN SERPL-MCNC: 11 MG/DL (ref 5–25)
CALCIUM SERPL-MCNC: 9.3 MG/DL (ref 8.3–10.1)
CHLORIDE SERPL-SCNC: 102 MMOL/L (ref 100–108)
CHOLEST SERPL-MCNC: 256 MG/DL (ref 50–200)
CO2 SERPL-SCNC: 28 MMOL/L (ref 21–32)
CREAT SERPL-MCNC: 0.92 MG/DL (ref 0.6–1.3)
EOSINOPHIL # BLD AUTO: 0.15 THOUSAND/ΜL (ref 0–0.61)
EOSINOPHIL NFR BLD AUTO: 3 % (ref 0–6)
ERYTHROCYTE [DISTWIDTH] IN BLOOD BY AUTOMATED COUNT: 11.7 % (ref 11.6–15.1)
GFR SERPL CREATININE-BSD FRML MDRD: 67 ML/MIN/1.73SQ M
GLUCOSE P FAST SERPL-MCNC: 96 MG/DL (ref 65–99)
HCT VFR BLD AUTO: 45.4 % (ref 34.8–46.1)
HDLC SERPL-MCNC: 67 MG/DL (ref 40–60)
HGB BLD-MCNC: 14.8 G/DL (ref 11.5–15.4)
IMM GRANULOCYTES # BLD AUTO: 0.01 THOUSAND/UL (ref 0–0.2)
IMM GRANULOCYTES NFR BLD AUTO: 0 % (ref 0–2)
LDLC SERPL CALC-MCNC: 164 MG/DL (ref 0–100)
LYMPHOCYTES # BLD AUTO: 2.12 THOUSANDS/ΜL (ref 0.6–4.47)
LYMPHOCYTES NFR BLD AUTO: 40 % (ref 14–44)
MCH RBC QN AUTO: 30.3 PG (ref 26.8–34.3)
MCHC RBC AUTO-ENTMCNC: 32.6 G/DL (ref 31.4–37.4)
MCV RBC AUTO: 93 FL (ref 82–98)
MONOCYTES # BLD AUTO: 0.45 THOUSAND/ΜL (ref 0.17–1.22)
MONOCYTES NFR BLD AUTO: 9 % (ref 4–12)
NEUTROPHILS # BLD AUTO: 2.53 THOUSANDS/ΜL (ref 1.85–7.62)
NEUTS SEG NFR BLD AUTO: 47 % (ref 43–75)
NONHDLC SERPL-MCNC: 189 MG/DL
NRBC BLD AUTO-RTO: 0 /100 WBCS
PLATELET # BLD AUTO: 290 THOUSANDS/UL (ref 149–390)
PMV BLD AUTO: 9.3 FL (ref 8.9–12.7)
POTASSIUM SERPL-SCNC: 4.3 MMOL/L (ref 3.5–5.3)
PROT SERPL-MCNC: 7.2 G/DL (ref 6.4–8.2)
RBC # BLD AUTO: 4.89 MILLION/UL (ref 3.81–5.12)
SODIUM SERPL-SCNC: 137 MMOL/L (ref 136–145)
TRIGL SERPL-MCNC: 127 MG/DL
WBC # BLD AUTO: 5.3 THOUSAND/UL (ref 4.31–10.16)

## 2019-01-11 PROCEDURE — 85025 COMPLETE CBC W/AUTO DIFF WBC: CPT | Performed by: FAMILY MEDICINE

## 2019-01-11 PROCEDURE — 80061 LIPID PANEL: CPT | Performed by: FAMILY MEDICINE

## 2019-01-11 PROCEDURE — 36415 COLL VENOUS BLD VENIPUNCTURE: CPT | Performed by: FAMILY MEDICINE

## 2019-01-11 PROCEDURE — 80053 COMPREHEN METABOLIC PANEL: CPT | Performed by: FAMILY MEDICINE

## 2019-05-21 DIAGNOSIS — I10 ESSENTIAL HYPERTENSION: ICD-10-CM

## 2019-05-21 RX ORDER — LISINOPRIL 10 MG/1
TABLET ORAL
Qty: 90 TABLET | Refills: 1 | Status: SHIPPED | OUTPATIENT
Start: 2019-05-21 | End: 2021-11-08

## 2020-01-13 DIAGNOSIS — I10 ESSENTIAL HYPERTENSION: ICD-10-CM

## 2020-01-13 RX ORDER — LISINOPRIL 10 MG/1
TABLET ORAL
Qty: 90 TABLET | Refills: 0 | OUTPATIENT
Start: 2020-01-13

## 2020-01-13 NOTE — TELEPHONE ENCOUNTER
Patient and  Clinton Narvaez moved to [place], he states he will still come here for Lelo Pickens  will call in February

## 2020-03-06 ENCOUNTER — APPOINTMENT (RX ONLY)
Dept: URBAN - METROPOLITAN AREA CLINIC 45 | Facility: CLINIC | Age: 65
Setting detail: DERMATOLOGY
End: 2020-03-06

## 2020-03-06 ENCOUNTER — APPOINTMENT (RX ONLY)
Dept: URBAN - METROPOLITAN AREA CLINIC 44 | Facility: CLINIC | Age: 65
Setting detail: DERMATOLOGY
End: 2020-03-06

## 2020-03-06 DIAGNOSIS — L91.8 OTHER HYPERTROPHIC DISORDERS OF THE SKIN: ICD-10-CM

## 2020-03-06 DIAGNOSIS — L85.3 XEROSIS CUTIS: ICD-10-CM

## 2020-03-06 DIAGNOSIS — L21.8 OTHER SEBORRHEIC DERMATITIS: ICD-10-CM

## 2020-03-06 DIAGNOSIS — L82.1 OTHER SEBORRHEIC KERATOSIS: ICD-10-CM

## 2020-03-06 DIAGNOSIS — D22 MELANOCYTIC NEVI: ICD-10-CM

## 2020-03-06 DIAGNOSIS — L60.8 OTHER NAIL DISORDERS: ICD-10-CM

## 2020-03-06 DIAGNOSIS — L81.4 OTHER MELANIN HYPERPIGMENTATION: ICD-10-CM

## 2020-03-06 DIAGNOSIS — D18.0 HEMANGIOMA: ICD-10-CM

## 2020-03-06 DIAGNOSIS — L57.3 POIKILODERMA OF CIVATTE: ICD-10-CM

## 2020-03-06 PROBLEM — D18.01 HEMANGIOMA OF SKIN AND SUBCUTANEOUS TISSUE: Status: ACTIVE | Noted: 2020-03-06

## 2020-03-06 PROBLEM — D22.5 MELANOCYTIC NEVI OF TRUNK: Status: ACTIVE | Noted: 2020-03-06

## 2020-03-06 PROBLEM — D22.4 MELANOCYTIC NEVI OF SCALP AND NECK: Status: ACTIVE | Noted: 2020-03-06

## 2020-03-06 PROBLEM — L29.8 OTHER PRURITUS: Status: ACTIVE | Noted: 2020-03-06

## 2020-03-06 PROBLEM — I10 ESSENTIAL (PRIMARY) HYPERTENSION: Status: ACTIVE | Noted: 2020-03-06

## 2020-03-06 PROBLEM — D23.62 OTHER BENIGN NEOPLASM OF SKIN OF LEFT UPPER LIMB, INCLUDING SHOULDER: Status: ACTIVE | Noted: 2020-03-06

## 2020-03-06 PROCEDURE — ? TREATMENT REGIMEN

## 2020-03-06 PROCEDURE — ? COUNSELING

## 2020-03-06 PROCEDURE — 99214 OFFICE O/P EST MOD 30 MIN: CPT

## 2020-03-06 PROCEDURE — ? PRESCRIPTION

## 2020-03-06 RX ORDER — KETOCONAZOLE 20 MG/ML
PEA SIZE SHAMPOO TOPICAL
Qty: 1 | Refills: 3 | Status: ERX | COMMUNITY
Start: 2020-03-06

## 2020-03-06 RX ADMIN — KETOCONAZOLE PEA SIZE: 20 SHAMPOO TOPICAL at 00:00

## 2020-03-06 ASSESSMENT — LOCATION SIMPLE DESCRIPTION DERM
LOCATION SIMPLE: LEFT THUMB
LOCATION SIMPLE: RIGHT INDEX FINGERNAIL
LOCATION SIMPLE: NECK
LOCATION SIMPLE: LEFT THUMB
LOCATION SIMPLE: LEFT RING FINGERNAIL
LOCATION SIMPLE: RIGHT BREAST
LOCATION SIMPLE: NECK
LOCATION SIMPLE: ABDOMEN
LOCATION SIMPLE: RIGHT LOWER BACK
LOCATION SIMPLE: LEFT CHEEK
LOCATION SIMPLE: RIGHT THUMBNAIL
LOCATION SIMPLE: RIGHT ANTERIOR NECK
LOCATION SIMPLE: UPPER BACK
LOCATION SIMPLE: LEFT MIDDLE FINGER
LOCATION SIMPLE: ABDOMEN
LOCATION SIMPLE: LEFT MIDDLE FINGER
LOCATION SIMPLE: LEFT ANTERIOR NECK
LOCATION SIMPLE: RIGHT THIGH
LOCATION SIMPLE: LEFT CHEEK
LOCATION SIMPLE: RIGHT ANTERIOR NECK
LOCATION SIMPLE: RIGHT INDEX FINGERNAIL
LOCATION SIMPLE: RIGHT LOWER BACK
LOCATION SIMPLE: RIGHT MIDDLE FINGERNAIL
LOCATION SIMPLE: RIGHT THIGH
LOCATION SIMPLE: RIGHT THUMBNAIL
LOCATION SIMPLE: LEFT INDEX FINGERNAIL
LOCATION SIMPLE: RIGHT MIDDLE FINGERNAIL
LOCATION SIMPLE: LEFT SCALP
LOCATION SIMPLE: UPPER BACK
LOCATION SIMPLE: LEFT RING FINGERNAIL
LOCATION SIMPLE: LEFT INDEX FINGERNAIL
LOCATION SIMPLE: LEFT ANTERIOR NECK
LOCATION SIMPLE: RIGHT BREAST
LOCATION SIMPLE: LEFT SCALP

## 2020-03-06 ASSESSMENT — LOCATION DETAILED DESCRIPTION DERM
LOCATION DETAILED: LEFT MEDIAL MALAR CHEEK
LOCATION DETAILED: RIGHT MIDDLE FINGERNAIL
LOCATION DETAILED: RIGHT CENTRAL LATERAL NECK
LOCATION DETAILED: EPIGASTRIC SKIN
LOCATION DETAILED: LEFT SUPERIOR ANTERIOR NECK
LOCATION DETAILED: LEFT MEDIAL MALAR CHEEK
LOCATION DETAILED: SUPERIOR THORACIC SPINE
LOCATION DETAILED: RIGHT CENTRAL LATERAL NECK
LOCATION DETAILED: RIGHT ANTERIOR DISTAL THIGH
LOCATION DETAILED: LEFT INFERIOR CENTRAL MALAR CHEEK
LOCATION DETAILED: EPIGASTRIC SKIN
LOCATION DETAILED: LEFT SUPERIOR ANTERIOR NECK
LOCATION DETAILED: LEFT INDEX FINGER LUNULA
LOCATION DETAILED: LEFT DISTAL DORSAL MIDDLE FINGER
LOCATION DETAILED: RIGHT INFERIOR ANTERIOR NECK
LOCATION DETAILED: LEFT DISTAL RADIAL THUMB
LOCATION DETAILED: RIGHT INFERIOR ANTERIOR NECK
LOCATION DETAILED: INFERIOR THORACIC SPINE
LOCATION DETAILED: LEFT INDEX FINGER LUNULA
LOCATION DETAILED: RIGHT RIB CAGE
LOCATION DETAILED: RIGHT SUPERIOR MEDIAL MIDBACK
LOCATION DETAILED: LEFT RING FINGER LUNULA
LOCATION DETAILED: LEFT MEDIAL FRONTAL SCALP
LOCATION DETAILED: RIGHT THUMBNAIL
LOCATION DETAILED: RIGHT MEDIAL BREAST 2-3:00 REGION
LOCATION DETAILED: RIGHT RIB CAGE
LOCATION DETAILED: INFERIOR THORACIC SPINE
LOCATION DETAILED: LEFT RING FINGER LUNULA
LOCATION DETAILED: RIGHT CLAVICULAR NECK
LOCATION DETAILED: LEFT INFERIOR CENTRAL MALAR CHEEK
LOCATION DETAILED: RIGHT CLAVICULAR NECK
LOCATION DETAILED: PERIUMBILICAL SKIN
LOCATION DETAILED: LEFT DISTAL RADIAL THUMB
LOCATION DETAILED: RIGHT SUPERIOR MEDIAL MIDBACK
LOCATION DETAILED: RIGHT INDEX FINGERNAIL
LOCATION DETAILED: PERIUMBILICAL SKIN
LOCATION DETAILED: SUPERIOR THORACIC SPINE
LOCATION DETAILED: RIGHT MEDIAL BREAST 2-3:00 REGION
LOCATION DETAILED: LEFT DISTAL DORSAL MIDDLE FINGER
LOCATION DETAILED: RIGHT THUMBNAIL
LOCATION DETAILED: RIGHT INDEX FINGERNAIL
LOCATION DETAILED: LEFT MEDIAL FRONTAL SCALP
LOCATION DETAILED: RIGHT MIDDLE FINGERNAIL
LOCATION DETAILED: RIGHT ANTERIOR DISTAL THIGH

## 2020-03-06 ASSESSMENT — LOCATION ZONE DERM
LOCATION ZONE: SCALP
LOCATION ZONE: NECK
LOCATION ZONE: FACE
LOCATION ZONE: FINGER
LOCATION ZONE: LEG
LOCATION ZONE: FACE
LOCATION ZONE: FINGER
LOCATION ZONE: NECK
LOCATION ZONE: TRUNK
LOCATION ZONE: LEG
LOCATION ZONE: FINGERNAIL
LOCATION ZONE: FINGERNAIL
LOCATION ZONE: SCALP
LOCATION ZONE: TRUNK

## 2020-03-06 NOTE — HPI: EVALUATION OF SKIN LESION(S)
What Type Of Note Output Would You Prefer (Optional)?: Standard Output
Hpi Title: Evaluation of Skin Lesions
How Severe Are Your Spot(S)?: mild
Have Your Spot(S) Been Treated In The Past?: has not been treated
Additional History: Pt presents for a FBSE. The only concerns about she has today is an itchy patch on the back of her neck and hair loss.

## 2020-03-06 NOTE — HPI: EVALUATION OF SKIN LESION(S)
What Type Of Note Output Would You Prefer (Optional)?: Standard Output
How Severe Are Your Spot(S)?: mild
Have Your Spot(S) Been Treated In The Past?: has not been treated
Hpi Title: Evaluation of Skin Lesions
Additional History: Pt presents for a FBSE. The only concerns about she has today is an itchy patch on the back of her neck and hair loss.

## 2021-07-06 ENCOUNTER — OFFICE VISIT (OUTPATIENT)
Dept: URGENT CARE | Age: 66
End: 2021-07-06
Payer: COMMERCIAL

## 2021-07-06 VITALS
HEART RATE: 72 BPM | SYSTOLIC BLOOD PRESSURE: 145 MMHG | DIASTOLIC BLOOD PRESSURE: 77 MMHG | OXYGEN SATURATION: 97 % | RESPIRATION RATE: 18 BRPM | TEMPERATURE: 97.2 F

## 2021-07-06 DIAGNOSIS — A69.20 ERYTHEMA MIGRANS (LYME DISEASE): Primary | ICD-10-CM

## 2021-07-06 PROCEDURE — 99213 OFFICE O/P EST LOW 20 MIN: CPT | Performed by: PHYSICIAN ASSISTANT

## 2021-07-06 RX ORDER — BLOOD-GLUCOSE METER
EACH MISCELLANEOUS
COMMUNITY
End: 2022-04-01

## 2021-07-06 RX ORDER — DOXYCYCLINE 100 MG/1
100 TABLET ORAL 2 TIMES DAILY
Qty: 28 TABLET | Refills: 0 | Status: SHIPPED | OUTPATIENT
Start: 2021-07-06 | End: 2021-07-20

## 2021-07-06 NOTE — PATIENT INSTRUCTIONS
Lyme Disease   AMBULATORY CARE:   Lyme disease  is a bacterial infection caused by the bite of an infected tick  Common symptoms include the following:   · A red rash that looks like a target or bull's eye    · Fever, chills, or sore throat    · Weakness and tiredness    · Headache or muscle aches    · Joint pain    · Abdominal pain, nausea, or diarrhea    Call your local emergency number (911 in the [facility]) if:   · Your heart is beating faster than usual and you feel dizzy  · You have chest pain or trouble breathing  · You suddenly cannot talk or see well, or you have trouble moving an area of your body  Return to the emergency department if:   · You have a headache and a stiff neck  · You have trouble concentrating or thinking clearly  · You have numbness or tingling in your arms or legs, or you have trouble walking  Call your doctor if:   · Your rash grows or spreads to other areas of your body  · You suddenly have trouble falling or staying asleep  · You have new or worsening pain and swelling in your joints  · You have new or worsening weakness and muscle pain  · You have a new tick bite  · You have questions or concerns about your condition or care  Treatment for Lyme disease  may include any of the following:  · Antibiotics  treat a bacterial infection  · NSAIDs , such as ibuprofen, help decrease swelling, pain, and fever  This medicine is available with or without a doctor's order  NSAIDs can cause stomach bleeding or kidney problems in certain people  If you take blood thinner medicine, always ask your healthcare provider if NSAIDs are safe for you  Always read the medicine label and follow directions  Prevent a tick bite:  Ticks live in areas covered by brush and grass  They may even be found in your lawn if you live in certain areas  Outdoor pets can carry ticks inside the house  Ticks can grab onto you or your clothes when you walk by grass or brush   If you go into areas that contain many trees, tall grasses, and underbrush, do the following:  · Wear light colored pants and a long-sleeved shirt  Tuck your pants into your socks or boots  Tuck in your shirt  Wear sleeves that fit close to the skin at your wrists and neck  This will help prevent ticks from crawling through gaps in your clothing and onto your skin  Wear a hat in areas with trees  · Apply insect repellant on your skin  The insect repellant should contain DEET  Do not put insect repellant on skin that is cut, scratched, or irritated  Always use soap and water to wash the insect repellant off as soon as possible once you are indoors  Do not apply insect repellant on your child's face or hands  · Spray insect repellant onto your clothes  Use permethrin spray  This spray kills ticks that crawl on your clothing  Be sure to spray the tops of your boots, bottom of pant legs, and sleeve cuffs  As soon as possible, wash and dry clothing in hot water and high heat  · Check your and your child's clothing, hair, and skin for ticks  Shower within 2 hours of coming indoors  Carefully check the hairline, armpits, neck, and waist      · Decrease the risk for ticks in your yard  Ticks like to live in shady, moist areas  Oswaldo Ballesteros your lawn regularly to keep the grass short  Trim the grass around birdbaths and fences  Cut branches that are overgrown and take them out of the yard  Clear out leaf piles  Darci Velha firewood in a dry, bianca area  · Treat pets with tick control products  as directed  This will decrease your risk for a tick bite  Check your pets for ticks  Remove ticks from pets the same way as you remove them from people  Ask your pet's  about the best product to use on your pet  · Remove a tick with tweezers  Wear gloves  Grasp the tick as close to your skin as possible  Pull the tick straight up and out  Do not touch the tick with your bare hands   Check to make sure you removed the whole tick, including the head  Clean the area with soap and water or rubbing alcohol  Then wash your hands with soap and water  Follow up with your doctor as directed:  Write down your questions so you remember to ask them during your visits  © Copyright [address] Information is for End User's use only and may not be sold, redistributed or otherwise used for commercial purposes  All illustrations and images included in CareNotes® are the copyrighted property of [employer] M [employer]  or [facility] Karen Olguin   The above information is an  only  It is not intended as medical advice for individual conditions or treatments  Talk to your doctor, nurse or pharmacist before following any medical regimen to see if it is safe and effective for you

## 2021-07-06 NOTE — PROGRESS NOTES
[facility]        NAME: Steffanie Turcios is a 63 y o  female  : 1955    MRN: [id number]  DATE: 2021  TIME: 11:19 AM    Assessment and Plan   Erythema migrans (Lyme disease) [A69 20]  1  Erythema migrans (Lyme disease)  doxycycline (ADOXA) 100 MG tablet         Patient Instructions     Patient Instructions   Lyme Disease   AMBULATORY CARE:   Lyme disease  is a bacterial infection caused by the bite of an infected tick  Common symptoms include the following:   · A red rash that looks like a target or bull's eye    · Fever, chills, or sore throat    · Weakness and tiredness    · Headache or muscle aches    · Joint pain    · Abdominal pain, nausea, or diarrhea    Call your local emergency number (911 in the [address] [place] [address],[address]) if:   · Your heart is beating faster than usual and you feel dizzy  · You have chest pain or trouble breathing  · You suddenly cannot talk or see well, or you have trouble moving an area of your body  Return to the emergency department if:   · You have a headache and a stiff neck  · You have trouble concentrating or thinking clearly  · You have numbness or tingling in your arms or legs, or you have trouble walking  Call your doctor if:   · Your rash grows or spreads to other areas of your body  · You suddenly have trouble falling or staying asleep  · You have new or worsening pain and swelling in your joints  · You have new or worsening weakness and muscle pain  · You have a new tick bite  · You have questions or concerns about your condition or care  Treatment for Lyme disease  may include any of the following:  · Antibiotics  treat a bacterial infection  · NSAIDs , such as ibuprofen, help decrease swelling, pain, and fever  This medicine is available with or without a doctor's order  NSAIDs can cause stomach bleeding or kidney problems in certain people   If you take blood thinner medicine, always ask your healthcare provider if NSAIDs are safe for you  Always read the medicine label and follow directions  Prevent a tick bite:  Ticks live in areas covered by brush and grass  They may even be found in your lawn if you live in certain areas  Outdoor pets can carry ticks inside the house  Ticks can grab onto you or your clothes when you walk by grass or brush  If you go into areas that contain many trees, tall grasses, and underbrush, do the following:  · Wear light colored pants and a long-sleeved shirt  Tuck your pants into your socks or boots  Tuck in your shirt  Wear sleeves that fit close to the skin at your wrists and neck  This will help prevent ticks from crawling through gaps in your clothing and onto your skin  Wear a hat in areas with trees  · Apply insect repellant on your skin  The insect repellant should contain DEET  Do not put insect repellant on skin that is cut, scratched, or irritated  Always use soap and water to wash the insect repellant off as soon as possible once you are indoors  Do not apply insect repellant on your child's face or hands  · Spray insect repellant onto your clothes  Use permethrin spray  This spray kills ticks that crawl on your clothing  Be sure to spray the tops of your boots, bottom of pant legs, and sleeve cuffs  As soon as possible, wash and dry clothing in hot water and high heat  · Check your and your child's clothing, hair, and skin for ticks  Shower within 2 hours of coming indoors  Carefully check the hairline, armpits, neck, and waist      · Decrease the risk for ticks in your yard  Ticks like to live in shady, moist areas  Hilda Messenger your lawn regularly to keep the grass short  Trim the grass around birdbaths and fences  Cut branches that are overgrown and take them out of the yard  Clear out leaf piles  Lisa Class firewood in a dry, bianca area  · Treat pets with tick control products  as directed  This will decrease your risk for a tick bite  Check your pets for ticks   Remove ticks from pets the same way as you remove them from people  Ask your pet's  about the best product to use on your pet  · Remove a tick with tweezers  Wear gloves  Grasp the tick as close to your skin as possible  Pull the tick straight up and out  Do not touch the tick with your bare hands  Check to make sure you removed the whole tick, including the head  Clean the area with soap and water or rubbing alcohol  Then wash your hands with soap and water  Follow up with your doctor as directed:  Write down your questions so you remember to ask them during your visits  © Copyright [address] Information is for End User's use only and may not be sold, redistributed or otherwise used for commercial purposes  All illustrations and images included in CareNotes® are the copyrighted property of [employer]  or [facility] Karen Olguin   The above information is an  only  It is not intended as medical advice for individual conditions or treatments  Talk to your doctor, nurse or pharmacist before following any medical regimen to see if it is safe and effective for you  Follow up with PCP in 3-5 days  Proceed to  ER if symptoms worsen  Chief Complaint     Chief Complaint   Patient presents with    Tick Removal     x 3 weeks ago, to left lower leg, pt   Rash     noted to area x a couple days ago, with use of hydrocortisone cream          History of Present Illness       29-year-old female presents with left ankle tick bite 3 weeks ago now she has a rash with erythema over the area  She tried hydrocortisone  No fever chills no body aches  Review of Systems   Review of Systems   Constitutional: Negative  HENT: Negative  Eyes: Negative  Respiratory: Negative  Cardiovascular: Negative  Gastrointestinal: Negative  Skin: Positive for rash           Current Medications       Current Outpatient Medications:     lisinopril (ZESTRIL) 10 mg tablet, TAKE 1 TABLET BY MOUTH DAILY, Disp: 90 tablet, Rfl: 1    Multiple Vitamins tablet, Take 1 tablet by mouth daily, Disp: , Rfl:     Omega-3 Fatty Acids (FISH OIL) 645 MG CAPS, Take 1 capsule by mouth daily, Disp: , Rfl:     Probiotic Product (UP4 PROBIOTICS ADULT PO), Take by mouth, Disp: , Rfl:     Red Yeast Rice 500 MG/0 5GM POWD, Take by mouth, Disp: , Rfl:     Biotin 5 MG TABS, Take 1 tablet by mouth daily (Patient not taking: Reported on 7/6/2021), Disp: , Rfl:     Cholecalciferol (VITAMIN D3 PO), Take 2,000 Units by mouth daily (Patient not taking: Reported on 7/6/2021), Disp: , Rfl:     doxycycline (ADOXA) 100 MG tablet, Take 1 tablet (100 mg total) by mouth 2 (two) times a day for 14 days, Disp: 28 tablet, Rfl: 0    estradiol (ESTRACE) 1 mg tablet, Take 1 tablet by mouth daily, Disp: , Rfl:     Current Allergies     Allergies as of 07/06/2021 - Reviewed 07/06/2021   Allergen Reaction Noted    Celebrex [celecoxib]  01/23/2017            The following portions of the patient's history were reviewed and updated as appropriate: allergies, current medications, past family history, past medical history, past social history, past surgical history and problem list      Past Medical History:   Diagnosis Date    Fibroids     H/O    Hyperlipemia     Hypertension     Menometrorrhagia 11/2013    + ENLARGING FIBROIDS + ABNORMAL ENDOMETRIAL COMPLEX    Other specified health status     ERT       Past Surgical History:   Procedure Laterality Date    APPENDECTOMY      DILATION AND CURETTAGE OF UTERUS  03/2014    EMB, D&C BENIGN ENDOMETRIAL POLYP     ELBOW SURGERY Left 1969    ELBOW SURGERY Left     ENDOMETRIAL BIOPSY  03/2014    EMB, D&C BENIGN ENDOMETRIAL POLYP     HYSTERECTOMY  10/2014    Total    TOTAL ABDOMINAL HYSTERECTOMY W/ BILATERAL SALPINGOOPHORECTOMY Left 2002    [facility]    TUBAL LIGATION      WISDOM TOOTH EXTRACTION         Family History   Problem Relation Age of Onset    Ovarian cancer Maternal Grandmother Medications have been verified  Objective   /77   Pulse 72   Temp (!) 97 2 °F (36 2 °C)   Resp 18   SpO2 97%        Physical Exam     Physical Exam  Constitutional:       General: She is not in acute distress  Appearance: She is well-developed  HENT:      Head: Normocephalic and atraumatic  Eyes:      Conjunctiva/sclera: Conjunctivae normal       Pupils: Pupils are equal, round, and reactive to light  Cardiovascular:      Rate and Rhythm: Normal rate and regular rhythm  Pulmonary:      Effort: Pulmonary effort is normal  No respiratory distress  Breath sounds: Normal breath sounds  Musculoskeletal:      Cervical back: Normal range of motion  Skin:     General: Skin is warm and dry  Comments: Left medial aspect of the ankle there is a 5-6 cm area of erythema with a central clearing appears to be a target type lesion  There is a central tick bite without purulence viral    Neurological:      Mental Status: She is alert and oriented to person, place, and time

## 2021-08-18 ENCOUNTER — OFFICE VISIT (OUTPATIENT)
Dept: OBGYN CLINIC | Facility: CLINIC | Age: 66
End: 2021-08-18
Payer: COMMERCIAL

## 2021-08-18 VITALS
BODY MASS INDEX: 28.53 KG/M2 | HEIGHT: 63 IN | SYSTOLIC BLOOD PRESSURE: 138 MMHG | DIASTOLIC BLOOD PRESSURE: 82 MMHG | WEIGHT: 161 LBS

## 2021-08-18 DIAGNOSIS — Z01.419 WELL WOMAN EXAM: Primary | ICD-10-CM

## 2021-08-18 DIAGNOSIS — R63.5 WEIGHT GAIN: ICD-10-CM

## 2021-08-18 DIAGNOSIS — Z12.31 ENCOUNTER FOR SCREENING MAMMOGRAM FOR MALIGNANT NEOPLASM OF BREAST: ICD-10-CM

## 2021-08-18 DIAGNOSIS — R53.83 FATIGUE, UNSPECIFIED TYPE: ICD-10-CM

## 2021-08-18 PROBLEM — E78.00 PURE HYPERCHOLESTEROLEMIA: Status: ACTIVE | Noted: 2019-05-24

## 2021-08-18 PROBLEM — M15.9 OSTEOARTHRITIS OF MULTIPLE JOINTS: Status: ACTIVE | Noted: 2019-05-24

## 2021-08-18 PROBLEM — M85.859 OSTEOPENIA OF HIP: Status: ACTIVE | Noted: 2019-05-24

## 2021-08-18 PROCEDURE — 1036F TOBACCO NON-USER: CPT | Performed by: PHYSICIAN ASSISTANT

## 2021-08-18 PROCEDURE — 99386 PREV VISIT NEW AGE 40-64: CPT | Performed by: PHYSICIAN ASSISTANT

## 2021-08-18 PROCEDURE — 3008F BODY MASS INDEX DOCD: CPT | Performed by: PHYSICIAN ASSISTANT

## 2021-08-18 NOTE — PROGRESS NOTES
Assessment/Plan   Diagnoses and all orders for this visit:    Well woman exam  -     Ambulatory referral to [facility]; Future    Encounter for screening mammogram for malignant neoplasm of breast    Fatigue, unspecified type  -     CBC and differential; Future  -     Comprehensive metabolic panel; Future  -     Lipid panel; Future  -     TSH, 3rd generation with Free T4 reflex; Future  -     Thyroid Antibodies Panel; Future  -     Vitamin D 25 hydroxy; Future    Weight gain  -     CBC and differential; Future  -     Comprehensive metabolic panel; Future  -     Lipid panel; Future  -     TSH, 3rd generation with Free T4 reflex; Future  -     Thyroid Antibodies Panel; Future  -     Vitamin D 25 hydroxy; Future    Other orders  -     MAGNESIUM PO; Take by mouth        Discussion    All questions have been answered to her satisfaction  RTO for APE or sooner if needed      Subjective     HPI   Pipo Cordova is a 61 y o  female who presents for annual well woman exam  NP to our office  LMP - 2010   Pt s/p hysterectomy 2014 due to thickened EL and fibroids, BSO as well  Pt was on oral estrace that she has discontinued approx 18 mths ago  Feeling more fatigued and having weight gain off of meds  Pt just moved back to the area from [facility]   has retired    No vulvar itch/burn; No vaginal itch/burn; No abn discharge or odor; No urinary sx - burning/pain/frequency/hematuria    (+) SBEs - no breast masses, asymmetry, nipple discharge or bleeding, changes in skin of breast, or breast tenderness bilaterally  No abd/pelvic pain or HAs; No menopausal symptoms: No hot flashes/night sweats, problems with intercourse, vaginal dryness; sleeping well  Pt is sexually active in a mutually monog/ sexual relationship; No issues with intercourse; She declines std/hiv/hep testing; Feels safe at home    Condom use:  (+) PCP for routine Bw/care;     Last Pap - 4/25/18 WNL neg HPV no ECC  History of abnormal Pap smear:   Last mammo - 18 BIRADS 1   History of abnormal mammogram:   Last colonoscopy -   Last Dexa scan -     Review of Systems   Constitutional: Positive for fatigue and unexpected weight change  Also with increased hair loss as well as hip/back pain and aches  Respiratory: Negative  Gastrointestinal: Negative  Endocrine: Negative  Genitourinary: Negative  Musculoskeletal: Positive for back pain  Skin: Positive for rash (recently tx'd for EM assoc w a tick bite)  Allergic/Immunologic: Negative  Neurological: Negative  Hematological: Negative  Psychiatric/Behavioral: Negative          The following portions of the patient's history were reviewed and updated as appropriate: allergies, current medications, past family history, past medical history, past social history, past surgical history and problem list          OB History        3    Para   2    Term   2            AB        Living   2       SAB        TAB        Ectopic        Multiple        Live Births                     Past Medical History:   Diagnosis Date    Fibroid     Fibroids     H/O    Hyperlipemia     Hypertension     Menometrorrhagia 2013    + ENLARGING FIBROIDS + ABNORMAL ENDOMETRIAL COMPLEX    Miscarriage 26    Other specified health status     ERT       Past Surgical History:   Procedure Laterality Date    APPENDECTOMY      DILATION AND CURETTAGE OF UTERUS  2014    EMB, D&C BENIGN ENDOMETRIAL POLYP     ELBOW SURGERY Left 1969    ELBOW SURGERY Left     ENDOMETRIAL BIOPSY  2014    EMB, D&C BENIGN ENDOMETRIAL POLYP     HYSTERECTOMY  10/2014    Total    TOTAL ABDOMINAL HYSTERECTOMY W/ BILATERAL SALPINGOOPHORECTOMY Left     [facility] BSO    TUBAL LIGATION      WISDOM TOOTH EXTRACTION         Family History   Problem Relation Age of Onset    Ovarian cancer Maternal Grandmother     Osteoarthritis Maternal Grandmother     Heart attack Father     Hyperlipidemia Father     Heart disease Mother     Osteoporosis Mother        Social History     Socioeconomic History    Marital status: /Civil Union     Spouse name: Not on file    Number of children: Not on file    Years of education: Not on file    Highest education level: Not on file   Occupational History    Not on file   Tobacco Use    Smoking status: Never Smoker    Smokeless tobacco: Never Used    Tobacco comment: NO TOBACCO USE   Substance and Sexual Activity    Alcohol use: Not Currently     Alcohol/week: 0 0 standard drinks    Drug use: Never    Sexual activity: Yes     Partners: Male     Birth control/protection: Post-menopausal, Female Sterilization     Comment: Total hysterectomy 2014   Other Topics Concern    Not on file   Social History Narrative    Not on file     Social Determinants of Health     Financial Resource Strain:     Difficulty of Paying Living Expenses:    Food Insecurity:     Worried About Running Out of Food in the Last Year:     Ran Out of Food in the Last Year:    Transportation Needs:     Lack of Transportation (Medical):      Lack of Transportation (Non-Medical):    Physical Activity:     Days of Exercise per Week:     Minutes of Exercise per Session:    Stress:     Feeling of Stress :    Social Connections:     Frequency of Communication with Friends and Family:     Frequency of Social Gatherings with Friends and Family:     Attends Sabianism Services:     Active Member of Clubs or Organizations:     Attends Club or Organization Meetings:     Marital Status:    Intimate Partner Violence:     Fear of Current or Ex-Partner:     Emotionally Abused:     Physically Abused:     Sexually Abused:          Current Outpatient Medications:     lisinopril (ZESTRIL) 10 mg tablet, TAKE 1 TABLET BY MOUTH  DAILY, Disp: 90 tablet, Rfl: 1    MAGNESIUM PO, Take by mouth, Disp: , Rfl:     Multiple Vitamins tablet, Take 1 tablet by mouth daily, Disp: , Rfl:     Omega-3 Fatty Acids (FISH OIL) 645 MG CAPS, Take 1 capsule by mouth daily, Disp: , Rfl:     Probiotic Product (UP4 PROBIOTICS ADULT PO), Take by mouth, Disp: , Rfl:     Red Yeast Rice 500 MG/0 5GM POWD, Take by mouth, Disp: , Rfl:     Allergies   Allergen Reactions    Celebrex [Celecoxib]      Other reaction(s): DOUBLE VISION, "FELT WEIRD"       Objective   Vitals:    08/18/21 1330   BP: 138/82   BP Location: Left arm   Patient Position: Sitting   Cuff Size: Standard   Weight: 73 kg (161 lb)   Height: 5' 3" (1 6 m)     Physical Exam  Constitutional:       Appearance: She is well-developed  HENT:      Head: Normocephalic and atraumatic  Cardiovascular:      Rate and Rhythm: Normal rate and regular rhythm  Pulmonary:      Effort: Pulmonary effort is normal       Breath sounds: Normal breath sounds  Chest:      Breasts: Breasts are symmetrical          Right: No inverted nipple, mass, nipple discharge, skin change or tenderness  Left: No inverted nipple, mass, nipple discharge, skin change or tenderness  Abdominal:      General: There is no distension  Palpations: Abdomen is soft  There is no mass  Tenderness: There is no guarding or rebound  Genitourinary:     Exam position: Supine  Labia:         Right: No rash  Left: No rash  Vagina: No signs of injury and foreign body  No vaginal discharge or erythema  Uterus: Absent  Rectum: Guaiac result negative  Comments: Cx/uterus and b/l ovaries surgically absent  Skin:     General: Skin is warm and dry  Neurological:      Mental Status: She is alert and oriented to person, place, and time  Patient Instructions   Pap deferred  R/w pt previous history and pap guidelines  Aware paps/pelvic exams are no longer needed due to hysterectomy w BSO for a benign cause  Mammo scheduled for next week  Colonoscopy up to date   Will plan labs due to fatigue and weight gain   Will f/u and establish w PCP locally as well

## 2021-08-18 NOTE — PATIENT INSTRUCTIONS
Pap deferred  R/w pt previous history and pap guidelines  Aware paps/pelvic exams are no longer needed due to hysterectomy w BSO for a benign cause  Mammo scheduled for next week  Colonoscopy up to date   Will plan labs due to fatigue and weight gain  Will f/u and establish w PCP locally as well

## 2021-08-20 ENCOUNTER — TELEPHONE (OUTPATIENT)
Dept: ADMINISTRATIVE | Facility: OTHER | Age: 66
End: 2021-08-20

## 2021-08-20 NOTE — TELEPHONE ENCOUNTER
----- Message from Paola Healy PA-C sent at 8/18/2021  2:01 PM EDT -----  Colonosocpy done 10/2017 w Dr Mara England please obtain a copy and update care gap  Thank you!

## 2021-08-20 NOTE — TELEPHONE ENCOUNTER
Upon review of the In Basket request we have found this is a duplicate request and no further action is needed  This request was completed upon initial request, the patient chart is up to date, and this message will now be closed  Any additional questions or concerns should be emailed to the Practice Liaisons via [email or web address] email, please do not reply via In Basket      Thank you  Stella Oliva MA

## 2021-08-26 ENCOUNTER — APPOINTMENT (OUTPATIENT)
Dept: LAB | Age: 66
End: 2021-08-26
Payer: COMMERCIAL

## 2021-08-26 ENCOUNTER — HOSPITAL ENCOUNTER (OUTPATIENT)
Dept: RADIOLOGY | Age: 66
Discharge: HOME | End: 2021-08-26
Payer: COMMERCIAL

## 2021-08-26 VITALS — BODY MASS INDEX: 28.53 KG/M2 | WEIGHT: 161 LBS | HEIGHT: 63 IN

## 2021-08-26 DIAGNOSIS — R63.5 WEIGHT GAIN: ICD-10-CM

## 2021-08-26 DIAGNOSIS — Z12.31 ENCOUNTER FOR SCREENING MAMMOGRAM FOR BREAST CANCER: ICD-10-CM

## 2021-08-26 DIAGNOSIS — R53.83 FATIGUE, UNSPECIFIED TYPE: ICD-10-CM

## 2021-08-26 LAB
25(OH)D3 SERPL-MCNC: 39.9 NG/ML (ref 30–100)
ALBUMIN SERPL BCP-MCNC: 3.8 G/DL (ref 3.5–5)
ALP SERPL-CCNC: 118 U/L (ref 46–116)
ALT SERPL W P-5'-P-CCNC: 43 U/L (ref 12–78)
ANION GAP SERPL CALCULATED.3IONS-SCNC: 2 MMOL/L (ref 4–13)
AST SERPL W P-5'-P-CCNC: 27 U/L (ref 5–45)
BASOPHILS # BLD AUTO: 0.04 THOUSANDS/ΜL (ref 0–0.1)
BASOPHILS NFR BLD AUTO: 1 % (ref 0–1)
BILIRUB SERPL-MCNC: 0.6 MG/DL (ref 0.2–1)
BUN SERPL-MCNC: 23 MG/DL (ref 5–25)
CALCIUM SERPL-MCNC: 9.3 MG/DL (ref 8.3–10.1)
CHLORIDE SERPL-SCNC: 106 MMOL/L (ref 100–108)
CHOLEST SERPL-MCNC: 220 MG/DL (ref 50–200)
CO2 SERPL-SCNC: 28 MMOL/L (ref 21–32)
CREAT SERPL-MCNC: 1.04 MG/DL (ref 0.6–1.3)
EOSINOPHIL # BLD AUTO: 0.17 THOUSAND/ΜL (ref 0–0.61)
EOSINOPHIL NFR BLD AUTO: 3 % (ref 0–6)
ERYTHROCYTE [DISTWIDTH] IN BLOOD BY AUTOMATED COUNT: 12.1 % (ref 11.6–15.1)
GFR SERPL CREATININE-BSD FRML MDRD: 57 ML/MIN/1.73SQ M
GLUCOSE P FAST SERPL-MCNC: 90 MG/DL (ref 65–99)
HCT VFR BLD AUTO: 44.6 % (ref 34.8–46.1)
HDLC SERPL-MCNC: 62 MG/DL
HGB BLD-MCNC: 14.9 G/DL (ref 11.5–15.4)
IMM GRANULOCYTES # BLD AUTO: 0.01 THOUSAND/UL (ref 0–0.2)
IMM GRANULOCYTES NFR BLD AUTO: 0 % (ref 0–2)
LDLC SERPL CALC-MCNC: 140 MG/DL (ref 0–100)
LYMPHOCYTES # BLD AUTO: 2.03 THOUSANDS/ΜL (ref 0.6–4.47)
LYMPHOCYTES NFR BLD AUTO: 37 % (ref 14–44)
MCH RBC QN AUTO: 30.5 PG (ref 26.8–34.3)
MCHC RBC AUTO-ENTMCNC: 33.4 G/DL (ref 31.4–37.4)
MCV RBC AUTO: 91 FL (ref 82–98)
MONOCYTES # BLD AUTO: 0.39 THOUSAND/ΜL (ref 0.17–1.22)
MONOCYTES NFR BLD AUTO: 7 % (ref 4–12)
NEUTROPHILS # BLD AUTO: 2.79 THOUSANDS/ΜL (ref 1.85–7.62)
NEUTS SEG NFR BLD AUTO: 52 % (ref 43–75)
NONHDLC SERPL-MCNC: 158 MG/DL
NRBC BLD AUTO-RTO: 0 /100 WBCS
PLATELET # BLD AUTO: 288 THOUSANDS/UL (ref 149–390)
PMV BLD AUTO: 10 FL (ref 8.9–12.7)
POTASSIUM SERPL-SCNC: 4.5 MMOL/L (ref 3.5–5.3)
PROT SERPL-MCNC: 7.2 G/DL (ref 6.4–8.2)
RBC # BLD AUTO: 4.88 MILLION/UL (ref 3.81–5.12)
SODIUM SERPL-SCNC: 136 MMOL/L (ref 136–145)
TRIGL SERPL-MCNC: 90 MG/DL
TSH SERPL DL<=0.05 MIU/L-ACNC: 1.53 UIU/ML (ref 0.36–3.74)
WBC # BLD AUTO: 5.43 THOUSAND/UL (ref 4.31–10.16)

## 2021-08-26 PROCEDURE — 77063 BREAST TOMOSYNTHESIS BI: CPT

## 2021-08-26 PROCEDURE — 84443 ASSAY THYROID STIM HORMONE: CPT

## 2021-08-26 PROCEDURE — 80061 LIPID PANEL: CPT

## 2021-08-26 PROCEDURE — 80053 COMPREHEN METABOLIC PANEL: CPT

## 2021-08-26 PROCEDURE — 82306 VITAMIN D 25 HYDROXY: CPT

## 2021-08-26 PROCEDURE — 86376 MICROSOMAL ANTIBODY EACH: CPT

## 2021-08-26 PROCEDURE — 85025 COMPLETE CBC W/AUTO DIFF WBC: CPT

## 2021-08-26 PROCEDURE — 77067 SCR MAMMO BI INCL CAD: CPT

## 2021-08-26 PROCEDURE — 86800 THYROGLOBULIN ANTIBODY: CPT

## 2021-08-26 PROCEDURE — 36415 COLL VENOUS BLD VENIPUNCTURE: CPT

## 2021-08-27 LAB
THYROGLOB AB SERPL-ACNC: <1 IU/ML (ref 0–0.9)
THYROPEROXIDASE AB SERPL-ACNC: 9 IU/ML (ref 0–34)

## 2021-11-08 ENCOUNTER — OFFICE VISIT (OUTPATIENT)
Dept: FAMILY MEDICINE CLINIC | Facility: CLINIC | Age: 66
End: 2021-11-08
Payer: COMMERCIAL

## 2021-11-08 VITALS
TEMPERATURE: 96.8 F | WEIGHT: 160.8 LBS | SYSTOLIC BLOOD PRESSURE: 130 MMHG | HEART RATE: 74 BPM | DIASTOLIC BLOOD PRESSURE: 80 MMHG | RESPIRATION RATE: 16 BRPM | BODY MASS INDEX: 29.59 KG/M2 | HEIGHT: 62 IN | OXYGEN SATURATION: 99 %

## 2021-11-08 DIAGNOSIS — Z23 NEED FOR INFLUENZA VACCINATION: ICD-10-CM

## 2021-11-08 DIAGNOSIS — Z00.00 ANNUAL PHYSICAL EXAM: Primary | ICD-10-CM

## 2021-11-08 DIAGNOSIS — M85.852 OSTEOPENIA OF BOTH HIPS: ICD-10-CM

## 2021-11-08 DIAGNOSIS — I10 PRIMARY HYPERTENSION: ICD-10-CM

## 2021-11-08 DIAGNOSIS — E78.00 PURE HYPERCHOLESTEROLEMIA: ICD-10-CM

## 2021-11-08 DIAGNOSIS — M85.851 OSTEOPENIA OF BOTH HIPS: ICD-10-CM

## 2021-11-08 PROCEDURE — 99386 PREV VISIT NEW AGE 40-64: CPT | Performed by: FAMILY MEDICINE

## 2021-11-08 PROCEDURE — 90471 IMMUNIZATION ADMIN: CPT | Performed by: FAMILY MEDICINE

## 2021-11-08 PROCEDURE — 1036F TOBACCO NON-USER: CPT | Performed by: FAMILY MEDICINE

## 2021-11-08 PROCEDURE — 3008F BODY MASS INDEX DOCD: CPT | Performed by: FAMILY MEDICINE

## 2021-11-08 PROCEDURE — 90682 RIV4 VACC RECOMBINANT DNA IM: CPT | Performed by: FAMILY MEDICINE

## 2021-11-08 PROCEDURE — 3725F SCREEN DEPRESSION PERFORMED: CPT | Performed by: FAMILY MEDICINE

## 2021-11-08 RX ORDER — OLMESARTAN MEDOXOMIL 5 MG/1
10 TABLET ORAL DAILY
Qty: 60 TABLET | Refills: 3 | Status: SHIPPED | OUTPATIENT
Start: 2021-11-08 | End: 2021-12-28 | Stop reason: DRUGHIGH

## 2021-11-18 ENCOUNTER — APPOINTMENT (OUTPATIENT)
Dept: LAB | Age: 66
End: 2021-11-18
Payer: COMMERCIAL

## 2021-11-18 DIAGNOSIS — Z00.00 ANNUAL PHYSICAL EXAM: ICD-10-CM

## 2021-11-18 DIAGNOSIS — E78.00 PURE HYPERCHOLESTEROLEMIA: ICD-10-CM

## 2021-11-18 LAB
ALBUMIN SERPL BCP-MCNC: 3.7 G/DL (ref 3.5–5)
ALP SERPL-CCNC: 135 U/L (ref 46–116)
ALT SERPL W P-5'-P-CCNC: 62 U/L (ref 12–78)
ANION GAP SERPL CALCULATED.3IONS-SCNC: 5 MMOL/L (ref 4–13)
AST SERPL W P-5'-P-CCNC: 33 U/L (ref 5–45)
BASOPHILS # BLD AUTO: 0.04 THOUSANDS/ΜL (ref 0–0.1)
BASOPHILS NFR BLD AUTO: 1 % (ref 0–1)
BILIRUB SERPL-MCNC: 0.78 MG/DL (ref 0.2–1)
BUN SERPL-MCNC: 18 MG/DL (ref 5–25)
CALCIUM SERPL-MCNC: 9.6 MG/DL (ref 8.3–10.1)
CHLORIDE SERPL-SCNC: 104 MMOL/L (ref 100–108)
CHOLEST SERPL-MCNC: 215 MG/DL (ref 50–200)
CO2 SERPL-SCNC: 29 MMOL/L (ref 21–32)
CREAT SERPL-MCNC: 1.01 MG/DL (ref 0.6–1.3)
EOSINOPHIL # BLD AUTO: 0.1 THOUSAND/ΜL (ref 0–0.61)
EOSINOPHIL NFR BLD AUTO: 2 % (ref 0–6)
ERYTHROCYTE [DISTWIDTH] IN BLOOD BY AUTOMATED COUNT: 11.9 % (ref 11.6–15.1)
GFR SERPL CREATININE-BSD FRML MDRD: 59 ML/MIN/1.73SQ M
GLUCOSE P FAST SERPL-MCNC: 85 MG/DL (ref 65–99)
HCT VFR BLD AUTO: 43.9 % (ref 34.8–46.1)
HCV AB SER QL: NORMAL
HDLC SERPL-MCNC: 53 MG/DL
HGB BLD-MCNC: 14.5 G/DL (ref 11.5–15.4)
IMM GRANULOCYTES # BLD AUTO: 0.01 THOUSAND/UL (ref 0–0.2)
IMM GRANULOCYTES NFR BLD AUTO: 0 % (ref 0–2)
LDLC SERPL CALC-MCNC: 129 MG/DL (ref 0–100)
LYMPHOCYTES # BLD AUTO: 2.11 THOUSANDS/ΜL (ref 0.6–4.47)
LYMPHOCYTES NFR BLD AUTO: 44 % (ref 14–44)
MCH RBC QN AUTO: 29.8 PG (ref 26.8–34.3)
MCHC RBC AUTO-ENTMCNC: 33 G/DL (ref 31.4–37.4)
MCV RBC AUTO: 90 FL (ref 82–98)
MONOCYTES # BLD AUTO: 0.38 THOUSAND/ΜL (ref 0.17–1.22)
MONOCYTES NFR BLD AUTO: 8 % (ref 4–12)
NEUTROPHILS # BLD AUTO: 2.14 THOUSANDS/ΜL (ref 1.85–7.62)
NEUTS SEG NFR BLD AUTO: 45 % (ref 43–75)
NRBC BLD AUTO-RTO: 0 /100 WBCS
PLATELET # BLD AUTO: 313 THOUSANDS/UL (ref 149–390)
PMV BLD AUTO: 9.5 FL (ref 8.9–12.7)
POTASSIUM SERPL-SCNC: 3.9 MMOL/L (ref 3.5–5.3)
PROT SERPL-MCNC: 7.4 G/DL (ref 6.4–8.2)
RBC # BLD AUTO: 4.86 MILLION/UL (ref 3.81–5.12)
SODIUM SERPL-SCNC: 138 MMOL/L (ref 136–145)
TRIGL SERPL-MCNC: 163 MG/DL
WBC # BLD AUTO: 4.78 THOUSAND/UL (ref 4.31–10.16)

## 2021-11-18 PROCEDURE — 36415 COLL VENOUS BLD VENIPUNCTURE: CPT | Performed by: FAMILY MEDICINE

## 2021-11-18 PROCEDURE — 86803 HEPATITIS C AB TEST: CPT

## 2021-11-18 PROCEDURE — 80061 LIPID PANEL: CPT

## 2021-11-18 PROCEDURE — 80053 COMPREHEN METABOLIC PANEL: CPT | Performed by: FAMILY MEDICINE

## 2021-11-18 PROCEDURE — 85025 COMPLETE CBC W/AUTO DIFF WBC: CPT | Performed by: FAMILY MEDICINE

## 2021-11-18 PROCEDURE — 87389 HIV-1 AG W/HIV-1&-2 AB AG IA: CPT

## 2021-11-19 LAB — HIV 1+2 AB+HIV1 P24 AG SERPL QL IA: NORMAL

## 2021-12-16 ENCOUNTER — EVALUATION (OUTPATIENT)
Dept: PHYSICAL THERAPY | Facility: CLINIC | Age: 66
End: 2021-12-16
Payer: COMMERCIAL

## 2021-12-16 ENCOUNTER — APPOINTMENT (OUTPATIENT)
Dept: LAB | Age: 66
End: 2021-12-16
Payer: COMMERCIAL

## 2021-12-16 DIAGNOSIS — Z01.89 RADIOLOGICAL EXAMINATION, NOT ELSEWHERE CLASSIFIED: ICD-10-CM

## 2021-12-16 DIAGNOSIS — Z01.818 OTHER SPECIFIED PRE-OPERATIVE EXAMINATION: ICD-10-CM

## 2021-12-16 DIAGNOSIS — M25.552 LEFT HIP PAIN: Primary | ICD-10-CM

## 2021-12-16 LAB
ALBUMIN SERPL BCP-MCNC: 3.7 G/DL (ref 3.5–5)
ALP SERPL-CCNC: 123 U/L (ref 46–116)
ALT SERPL W P-5'-P-CCNC: 36 U/L (ref 12–78)
ANION GAP SERPL CALCULATED.3IONS-SCNC: 3 MMOL/L (ref 4–13)
AST SERPL W P-5'-P-CCNC: 22 U/L (ref 5–45)
BASOPHILS # BLD AUTO: 0.04 THOUSANDS/ΜL (ref 0–0.1)
BASOPHILS NFR BLD AUTO: 1 % (ref 0–1)
BILIRUB SERPL-MCNC: 0.82 MG/DL (ref 0.2–1)
BUN SERPL-MCNC: 18 MG/DL (ref 5–25)
CALCIUM SERPL-MCNC: 9.7 MG/DL (ref 8.3–10.1)
CHLORIDE SERPL-SCNC: 104 MMOL/L (ref 100–108)
CO2 SERPL-SCNC: 30 MMOL/L (ref 21–32)
CREAT SERPL-MCNC: 1.1 MG/DL (ref 0.6–1.3)
EOSINOPHIL # BLD AUTO: 0.09 THOUSAND/ΜL (ref 0–0.61)
EOSINOPHIL NFR BLD AUTO: 2 % (ref 0–6)
ERYTHROCYTE [DISTWIDTH] IN BLOOD BY AUTOMATED COUNT: 11.9 % (ref 11.6–15.1)
GFR SERPL CREATININE-BSD FRML MDRD: 53 ML/MIN/1.73SQ M
GLUCOSE P FAST SERPL-MCNC: 88 MG/DL (ref 65–99)
HCT VFR BLD AUTO: 44.3 % (ref 34.8–46.1)
HGB BLD-MCNC: 14.6 G/DL (ref 11.5–15.4)
IMM GRANULOCYTES # BLD AUTO: 0.01 THOUSAND/UL (ref 0–0.2)
IMM GRANULOCYTES NFR BLD AUTO: 0 % (ref 0–2)
LYMPHOCYTES # BLD AUTO: 2.12 THOUSANDS/ΜL (ref 0.6–4.47)
LYMPHOCYTES NFR BLD AUTO: 40 % (ref 14–44)
MCH RBC QN AUTO: 30 PG (ref 26.8–34.3)
MCHC RBC AUTO-ENTMCNC: 33 G/DL (ref 31.4–37.4)
MCV RBC AUTO: 91 FL (ref 82–98)
MONOCYTES # BLD AUTO: 0.39 THOUSAND/ΜL (ref 0.17–1.22)
MONOCYTES NFR BLD AUTO: 7 % (ref 4–12)
NEUTROPHILS # BLD AUTO: 2.66 THOUSANDS/ΜL (ref 1.85–7.62)
NEUTS SEG NFR BLD AUTO: 50 % (ref 43–75)
NRBC BLD AUTO-RTO: 0 /100 WBCS
PLATELET # BLD AUTO: 315 THOUSANDS/UL (ref 149–390)
PMV BLD AUTO: 9.6 FL (ref 8.9–12.7)
POTASSIUM SERPL-SCNC: 4 MMOL/L (ref 3.5–5.3)
PROT SERPL-MCNC: 7.3 G/DL (ref 6.4–8.2)
RBC # BLD AUTO: 4.87 MILLION/UL (ref 3.81–5.12)
SODIUM SERPL-SCNC: 137 MMOL/L (ref 136–145)
WBC # BLD AUTO: 5.31 THOUSAND/UL (ref 4.31–10.16)

## 2021-12-16 PROCEDURE — 36415 COLL VENOUS BLD VENIPUNCTURE: CPT

## 2021-12-16 PROCEDURE — 85025 COMPLETE CBC W/AUTO DIFF WBC: CPT

## 2021-12-16 PROCEDURE — 97161 PT EVAL LOW COMPLEX 20 MIN: CPT | Performed by: PHYSICAL THERAPIST

## 2021-12-16 PROCEDURE — 80053 COMPREHEN METABOLIC PANEL: CPT

## 2021-12-16 PROCEDURE — 97110 THERAPEUTIC EXERCISES: CPT | Performed by: PHYSICAL THERAPIST

## 2021-12-22 ENCOUNTER — OFFICE VISIT (OUTPATIENT)
Dept: PHYSICAL THERAPY | Facility: CLINIC | Age: 66
End: 2021-12-22
Payer: COMMERCIAL

## 2021-12-22 DIAGNOSIS — M25.552 LEFT HIP PAIN: Primary | ICD-10-CM

## 2021-12-22 PROCEDURE — 97110 THERAPEUTIC EXERCISES: CPT

## 2021-12-22 PROCEDURE — 97140 MANUAL THERAPY 1/> REGIONS: CPT

## 2021-12-28 ENCOUNTER — OFFICE VISIT (OUTPATIENT)
Dept: FAMILY MEDICINE CLINIC | Facility: CLINIC | Age: 66
End: 2021-12-28
Payer: COMMERCIAL

## 2021-12-28 VITALS
BODY MASS INDEX: 30.03 KG/M2 | OXYGEN SATURATION: 100 % | DIASTOLIC BLOOD PRESSURE: 80 MMHG | HEIGHT: 62 IN | RESPIRATION RATE: 14 BRPM | TEMPERATURE: 97 F | SYSTOLIC BLOOD PRESSURE: 132 MMHG | HEART RATE: 96 BPM | WEIGHT: 163.2 LBS

## 2021-12-28 DIAGNOSIS — M16.12 PRIMARY OSTEOARTHRITIS OF LEFT HIP: ICD-10-CM

## 2021-12-28 DIAGNOSIS — I10 PRIMARY HYPERTENSION: ICD-10-CM

## 2021-12-28 DIAGNOSIS — Z01.818 PRE-OPERATIVE CLEARANCE: Primary | ICD-10-CM

## 2021-12-28 PROCEDURE — 3075F SYST BP GE 130 - 139MM HG: CPT | Performed by: FAMILY MEDICINE

## 2021-12-28 PROCEDURE — 93000 ELECTROCARDIOGRAM COMPLETE: CPT | Performed by: FAMILY MEDICINE

## 2021-12-28 PROCEDURE — 1036F TOBACCO NON-USER: CPT | Performed by: FAMILY MEDICINE

## 2021-12-28 PROCEDURE — 3008F BODY MASS INDEX DOCD: CPT | Performed by: FAMILY MEDICINE

## 2021-12-28 PROCEDURE — 3079F DIAST BP 80-89 MM HG: CPT | Performed by: FAMILY MEDICINE

## 2021-12-28 PROCEDURE — 99214 OFFICE O/P EST MOD 30 MIN: CPT | Performed by: FAMILY MEDICINE

## 2021-12-28 RX ORDER — OLMESARTAN MEDOXOMIL 20 MG/1
20 TABLET ORAL DAILY
Qty: 90 TABLET | Refills: 0 | Status: SHIPPED | OUTPATIENT
Start: 2021-12-28 | End: 2022-04-01 | Stop reason: SDUPTHER

## 2021-12-28 RX ORDER — CELECOXIB 200 MG/1
CAPSULE ORAL
COMMUNITY
Start: 2021-12-15 | End: 2022-02-09

## 2022-01-05 ENCOUNTER — EVALUATION (OUTPATIENT)
Dept: PHYSICAL THERAPY | Facility: CLINIC | Age: 67
End: 2022-01-05
Payer: COMMERCIAL

## 2022-01-05 DIAGNOSIS — M25.552 LEFT HIP PAIN: Primary | ICD-10-CM

## 2022-01-05 PROCEDURE — 97161 PT EVAL LOW COMPLEX 20 MIN: CPT | Performed by: PHYSICAL THERAPIST

## 2022-01-05 PROCEDURE — 97110 THERAPEUTIC EXERCISES: CPT | Performed by: PHYSICAL THERAPIST

## 2022-01-05 NOTE — PROGRESS NOTES
PT Evaluation     Today's date: 2022  Patient name: Linette Cristina  : 1955  MRN: [id number]  Referring provider: Miracle Rose MD  Dx:   Encounter Diagnosis     ICD-10-CM    1  Left hip pain  M25 552                   Assessment  Assessment details: Pt is a 61 y o  female who presents to outpatient PT with pain, decreased rom, decreased strength and decreased functional mobility  She will benefit from skilled PT to address these deficits in order to achieve her goals and maximize her functional mobility  Goals  Short Term Goals: Independent performance of initial hep  Decrease pain 2 points on VAS      Long Term Goals: Independent performance of comprehensive hep  Work performance is returned to max level of function  Performance of IADL's is returned to max level of function  Performance in recreational activities is improved to max level of function  Able to walk community distances with no AD  Able to climb stairs with reciprocal gait pattern and single rail  Plan  Planned therapy interventions: IADL retraining, joint mobilization, manual therapy, massage, patient education, postural training, strengthening, stretching, therapeutic activities, therapeutic exercise, therapeutic training, transfer training, balance/weight bearing training and home exercise program  Frequency: 2x week  Duration in weeks: 8        Subjective Evaluation    History of Present Illness  Mechanism of injury: Pt underwent L CHI on 1/3/22  Reports that her pain is controlled with pain medication and mm relaxor  Is currently ambulating with a RW  Reports that she has a first floor set up currently  Reports that she would like to return to walking for reaction and playing with grandchildren  In approx 6 week she plans to have R CHI  History of chronic lumbar and knee pain       Pain  Current pain ratin  At worst pain ratin    Patient Goals  Patient goals for therapy: decreased edema, decreased pain, increased motion, increased strength and independence with ADLs/IADLs          Objective     L hip:    ROM:    Flex:90  Abd: 15        Strength:    Flex: 3 /5  Abd: 3/5  Ir: 4/5  Er: 4-/5  Ext:  4-/5    Unable to perform SLR without extensor lag  Mod edema noted  Ambulates with antalgic gait patter  Neg howman's sign        Flowsheet Rows      Most Recent Value   PT/OT G-Codes    Current Score 49   Projected Score 71             Precautions: L CHI      Manuals 1/5            prom kl                                                   Neuro Re-Ed                                                                                                        Ther Ex             Heel slides 5"x20            gastroc stretch strap 30"x4            Ball squeeze 5"x20            saq x20            laq             Hs curls             marching             Mini squats             Step up             Hr/tr             slr abd             Cone taps                          Ther Activity                                       Gait Training                                       Modalities

## 2022-01-05 NOTE — LETTER
January 10, 9209    MD Yohan Garza [address] [place] [address]    Patient: Donald Mclain   YOB: 1955   Date of Visit: 2022     Encounter Diagnosis     ICD-10-CM    1  Left hip pain  M25 552        Dear Dr Cedillo Ours: Thank you for your recent referral of Donald Mclain  Please review the attached evaluation summary from Yamini's recent visit  Please verify that you agree with the plan of care by signing the attached order  If you have any questions or concerns, please do not hesitate to call  I sincerely appreciate the opportunity to share in the care of one of your patients and hope to have another opportunity to work with you in the near future  Sincerely,    Saint Ditto, PT      Referring Provider:      I certify that I have read the below Plan of Care and certify the need for these services furnished under this plan of treatment while under my care  MD Yohan Garza [address] [id number]  Via Fax: [phone]          PT Evaluation     Today's date: 2022  Patient name: Donald Mclain  : 1955  MRN: [id number]  Referring provider: Vernie Simmonds, MD  Dx:   Encounter Diagnosis     ICD-10-CM    1  Left hip pain  M25 552                   Assessment  Assessment details: Pt is a 61 y o  female who presents to outpatient PT with pain, decreased rom, decreased strength and decreased functional mobility  She will benefit from skilled PT to address these deficits in order to achieve her goals and maximize her functional mobility  Goals  Short Term Goals: Independent performance of initial hep  Decrease pain 2 points on VAS      Long Term Goals:   Independent performance of comprehensive hep  Work performance is returned to max level of function  Performance of IADL's is returned to max level of function  Performance in recreational activities is improved to max level of function  Able to walk community distances with no AD  Able to climb stairs with reciprocal gait pattern and single rail  Plan  Planned therapy interventions: IADL retraining, joint mobilization, manual therapy, massage, patient education, postural training, strengthening, stretching, therapeutic activities, therapeutic exercise, therapeutic training, transfer training, balance/weight bearing training and home exercise program  Frequency: 2x week  Duration in weeks: 8        Subjective Evaluation    History of Present Illness  Mechanism of injury: Pt underwent L CHI on 1/3/22  Reports that her pain is controlled with pain medication and mm relaxor  Is currently ambulating with a RW  Reports that she has a first floor set up currently  Reports that she would like to return to walking for reaction and playing with grandchildren  In approx 6 week she plans to have R CHI  History of chronic lumbar and knee pain       Pain  Current pain ratin  At worst pain ratin    Patient Goals  Patient goals for therapy: decreased edema, decreased pain, increased motion, increased strength and independence with ADLs/IADLs          Objective     L hip:    ROM:    Flex:90  Abd: 15        Strength:    Flex: 3 /5  Abd: 3/5  Ir: 4/5  Er: 4-/5  Ext:  4-/5    Unable to perform SLR without extensor lag  Mod edema noted  Ambulates with antalgic gait patter  Neg howman's sign        Flowsheet Rows      Most Recent Value   PT/OT G-Codes    Current Score 49   Projected Score 71             Precautions: L CHI      Manuals             prom kl                                                   Neuro Re-Ed                                                                                                        Ther Ex             Heel slides 5"x20            gastroc stretch strap 30"x4            Ball squeeze 5"x20            saq x20            laq             Hs curls             marching             Mini squats             Step up             Hr/tr             slr abd             Cone taps                          Ther Activity                                       Gait Training                                       Modalities

## 2022-01-11 ENCOUNTER — OFFICE VISIT (OUTPATIENT)
Dept: PHYSICAL THERAPY | Facility: CLINIC | Age: 67
End: 2022-01-11
Payer: COMMERCIAL

## 2022-01-11 DIAGNOSIS — M25.552 LEFT HIP PAIN: Primary | ICD-10-CM

## 2022-01-11 PROCEDURE — 97140 MANUAL THERAPY 1/> REGIONS: CPT

## 2022-01-11 PROCEDURE — 97110 THERAPEUTIC EXERCISES: CPT

## 2022-01-11 NOTE — PROGRESS NOTES
Daily Note     Today's date: 2022  Patient name: Blue Dear  : 1955  MRN: [id number]  Referring provider: Justo Mann MD  Dx:   Encounter Diagnosis     ICD-10-CM    1  Left hip pain  M25 552        Start Time: 1232  Stop Time: 1313  Total time in clinic (min): 41 minutes       Subjective: Patient reports she is having "good days and bad days "  Patient reports she is currently taking Celebrex, Tramadol, and Tylenol  Patient reports she is scheduled to return to the surgeon on 22  Objective: See treatment diary below  Assessment: Treatment is tolerated well  Patient demonstrates the ability to ambulate safely with the [facility]  Patient would benefit from continued PT to restore functional ROM and strength in the left hip  Plan: Continue treatment as per PT plan of care         Precautions: L CHI 1/3/22      Manuals            prom kl JLW                                                  Neuro Re-Ed                                                                                                        Ther Ex             Heel slides 5"x20 20           gastroc stretch strap 30"x4 manual           Ball squeeze 5"x20 5"x20           saq x20 20           laq  2# x20           Hs curls  20           marching             Mini squats  20           Step up   4 inch   15           Hr/tr   hr 20           slr abd   stand   20           Cone taps                          Ther Activity             SPC  3'                        Gait Training                                       Modalities

## 2022-01-14 ENCOUNTER — OFFICE VISIT (OUTPATIENT)
Dept: PHYSICAL THERAPY | Facility: CLINIC | Age: 67
End: 2022-01-14
Payer: COMMERCIAL

## 2022-01-14 DIAGNOSIS — M25.552 LEFT HIP PAIN: Primary | ICD-10-CM

## 2022-01-14 PROCEDURE — 97110 THERAPEUTIC EXERCISES: CPT

## 2022-01-14 PROCEDURE — 97140 MANUAL THERAPY 1/> REGIONS: CPT

## 2022-01-14 NOTE — PROGRESS NOTES
Daily Note     Today's date: 2022  Patient name: Major Brasher  : 1955  MRN: [id number]  Referring provider: Kerri Mcmullen MD  Dx:   Encounter Diagnosis     ICD-10-CM    1  Left hip pain  M25 552        Start Time: 1430  Stop Time: 1515  Total time in clinic (min): 45 minutes      Subjective: Patient reports she was able to go grocery shopping  Patient reports she's been using her [employer] when walking out in the community and has occasionally walked at home without an assistive device  Objective: See treatment diary below  Assessment: Progression of therapeutic exercise program is tolerated well  Patient would benefit from continued PT to improve left hip ROM, strength, and stability  Plan: Continue treatment as per PT plan of care         Precautions: L CHI 1/3/22      Manuals           prom kl JLW JLW                                                 Neuro Re-Ed                                                                                                        Ther Ex             Heel slides 5"x20 20 20          gastroc stretch strap 30"x4 manual           Ball squeeze 5"x20 5"x20 5"x20          bridge `  20          saq x20 20 20          laq  2# x20 3#x20          Hs curls  20 3#x20          marching             Mini squats  20 20          Step up   4 inch   15  6 inch   20          Hr/tr   hr 20  20 ea          slr abd   stand   20  stand   20          Cone taps    20 ea                       Ther Activity             SPC  3'                        Gait Training                                       Modalities

## 2022-01-18 ENCOUNTER — OFFICE VISIT (OUTPATIENT)
Dept: PHYSICAL THERAPY | Facility: CLINIC | Age: 67
End: 2022-01-18
Payer: COMMERCIAL

## 2022-01-18 DIAGNOSIS — M25.552 LEFT HIP PAIN: Primary | ICD-10-CM

## 2022-01-18 PROCEDURE — 97140 MANUAL THERAPY 1/> REGIONS: CPT

## 2022-01-18 PROCEDURE — 97110 THERAPEUTIC EXERCISES: CPT

## 2022-01-18 NOTE — PROGRESS NOTES
Daily Note     Today's date: 2022  Patient name: Major Brasher  : 1955  MRN: [id number]  Referring provider: Kerri Mcmullen MD  Dx:   Encounter Diagnosis     ICD-10-CM    1  Left hip pain  M25 552        Start Time: 1103  Stop Time: 1147  Total time in clinic (min): 44 minutes       Subjective: Patient reports she may have over did it over the weekend watching her grandchildren  Patient reports she rested yesterday but then her left hip felt stiff  Objective: See treatment diary below  Assessment: Patient is doing well status post left TKA  Patient has good tolerance to additional supine slr flexion exercise  Plan: Continue treatment as per PT plan of care         Precautions: L CHI 1/3/22      Manuals          prom kl JLW JLW JLW                                                Neuro Re-Ed                                                                                                        Ther Ex             Heel slides 5"x20 20 20          gastroc stretch strap 30"x4 manual           Ball squeeze 5"x20 5"x20 5"x20 5"x20         bridge `  20 20         saq x20 20 20 20         laq  2# x20 3#x20 3# x20         Hs curls  20 3#x20 3# x20         marching             Mini squats  20 20 20         Step up   4 inch   15  6 inch   20  6 inch   20         Hr/tr   hr 20  20 ea  20 ea         slr abd   stand   20  stand   20  stand   20         Supine slr flex      2x10         Cone taps    20 ea  20 ea         bike                                       Ther Activity             SPC  3'                        Gait Training                                       Modalities

## 2022-01-21 ENCOUNTER — OFFICE VISIT (OUTPATIENT)
Dept: PHYSICAL THERAPY | Facility: CLINIC | Age: 67
End: 2022-01-21
Payer: COMMERCIAL

## 2022-01-21 DIAGNOSIS — M25.552 LEFT HIP PAIN: Primary | ICD-10-CM

## 2022-01-21 PROCEDURE — 97110 THERAPEUTIC EXERCISES: CPT

## 2022-01-21 PROCEDURE — 97140 MANUAL THERAPY 1/> REGIONS: CPT

## 2022-01-21 PROCEDURE — 97530 THERAPEUTIC ACTIVITIES: CPT

## 2022-01-21 NOTE — PROGRESS NOTES
Daily Note     Today's date: 2022  Patient name: Yulia Patino  : 1955  MRN: [id number]  Referring provider: Rip Munoz MD  Dx:   Encounter Diagnosis     ICD-10-CM    1  Left hip pain  M25 552        Start Time: 948  Stop Time: 1030  Total time in clinic (min): 42 minutes       Subjective: Patient reports soreness in her left hip has dissipated  Patient reports she's been able to go up to the second floor of her home  Objective: See treatment diary below  Assessment: Progression of therapeutic exercise program is tolerated well  Comfortable stretch noted during left hip PROM  Patient would benefit from continued PT to improve left hip ROM, strength, and stability  Plan: Continue treatment as per PT plan of care         Precautions: L CHI 1/3/22      Manuals         prom kl JLW JLW JLW JLW                                               Neuro Re-Ed                                                                                                        Ther Ex             Heel slides 5"x20 20 20          gastroc stretch strap 30"x4 manual           Ball squeeze 5"x20 5"x20 5"x20 5"x20         bridge `  20 20 20        saq x20 20 20 20 3# x30        laq  2# x20 3#x20 3# x20 3# x30        Hs curls  20 3#x20 3# x20 3# x30        marching             Mini squats  20 20 20 20        Step up   4 inch   15  6 inch   20  6 inch   20  see    below        Hr/tr   hr 20  20 ea  20 ea  30 ea        slr abd   stand   20  stand   20  stand   20  side   lying   20        Supine slr flex      2x10  20         Cone taps    20 ea  20 ea  30 ea        clamshell supine      green   20                     Ther Activity             SPC  3'           bike     6'        Step up      8 inch   20                                  Gait Training                                       Modalities

## 2022-01-25 ENCOUNTER — OFFICE VISIT (OUTPATIENT)
Dept: PHYSICAL THERAPY | Facility: CLINIC | Age: 67
End: 2022-01-25
Payer: COMMERCIAL

## 2022-01-25 DIAGNOSIS — M25.552 LEFT HIP PAIN: Primary | ICD-10-CM

## 2022-01-25 PROCEDURE — 97530 THERAPEUTIC ACTIVITIES: CPT | Performed by: PHYSICAL THERAPIST

## 2022-01-25 PROCEDURE — 97110 THERAPEUTIC EXERCISES: CPT | Performed by: PHYSICAL THERAPIST

## 2022-01-25 PROCEDURE — 97140 MANUAL THERAPY 1/> REGIONS: CPT | Performed by: PHYSICAL THERAPIST

## 2022-01-25 NOTE — PROGRESS NOTES
Daily Note     Today's date: 2022  Patient name: Radha Wetzel  : 1955  MRN: [id number]  Referring provider: Esther Lesch, MD  Dx:   Encounter Diagnosis     ICD-10-CM    1  Left hip pain  M25 552                      Subjective: Pt reports that her surgeon is pleased with her progress and would like her to continue PT at this time  Reports that she will only use her [facility] when walking community distances but does not need it in her home or with stair climbing  Reports that her R hip is also feeling better since taking celebrex so she is postponing having this replaced  Objective: See treatment diary below  Assessment: added and progressed therex as listed  Pt denies any increase in pain but fatigues quickly with sidestepping and SLS  Education pt on possible DOMS from the progression  Monitor response and progress as mercy Aguirre Plan: Continue treatment as per PT plan of care         Precautions: L CHI 1/3/22      Manuals        prom kl JLW JLW JLW JLW kl                                              Neuro Re-Ed                                                                                                        Ther Ex             Heel slides 5"x20 20 20          gastroc stretch strap 30"x4 manual           Ball squeeze 5"x20 5"x20 5"x20 5"x20  With bridge 5"x20       bridge `  20 20 20        saq x20 20 20 20 3# x30        laq  2# x20 3#x20 3# x20 3# x30 3#x30       Hs curls  20 3#x20 3# x20 3# x30 3#x30       marching             Mini squats  20 20 20 20 x30       Step up   4 inch   15  6 inch   20  6 inch   20  see    below        Hr/tr   hr 20  20 ea  20 ea  30 ea 30       slr abd   stand   20  stand   20  stand   20  side   lying   20        Supine slr flex      2x10  20  x20       Cone taps    20 ea  20 ea  30 ea        clamshell supine      green   20 Black tb 5"x20       SLR abd standing      3#x30       sls      15"x4       sidestepping      6ft x4 green tb        Ther Activity             SPC  3'           bike     6' 8'       Step up      8 inch   20 8'x20       Lateral step up      10"x10                    Gait Training                                       Modalities

## 2022-01-28 ENCOUNTER — OFFICE VISIT (OUTPATIENT)
Dept: PHYSICAL THERAPY | Facility: CLINIC | Age: 67
End: 2022-01-28
Payer: COMMERCIAL

## 2022-01-28 DIAGNOSIS — M25.552 LEFT HIP PAIN: Primary | ICD-10-CM

## 2022-01-28 PROCEDURE — 97140 MANUAL THERAPY 1/> REGIONS: CPT | Performed by: PHYSICAL THERAPIST

## 2022-01-28 PROCEDURE — 97110 THERAPEUTIC EXERCISES: CPT | Performed by: PHYSICAL THERAPIST

## 2022-01-28 PROCEDURE — 97530 THERAPEUTIC ACTIVITIES: CPT | Performed by: PHYSICAL THERAPIST

## 2022-01-28 NOTE — PROGRESS NOTES
Daily Note     Today's date: 2022  Patient name: Brett Galaviz  : 1955  MRN: [id number]  Referring provider: Zhang Bhatt MD  Dx:   Encounter Diagnosis     ICD-10-CM    1  Left hip pain  M25 552                      Subjective: pt offers no new complaints upon arrival to PT today      Objective: See treatment diary below  Assessment: progressed therex as listed with no complaints other then fatigue, continue to progress as mercy Nv  Plan: Continue treatment as per PT plan of care         Precautions: L CHI 1/3/22      Manuals       prom kl JLW JLW JLW JLW kl kl                                             Neuro Re-Ed                                                                                                        Ther Ex             Heel slides 5"x20 20 20          gastroc stretch strap 30"x4 manual           Ball squeeze 5"x20 5"x20 5"x20 5"x20  With bridge 5"x20 5"x30      bridge `  20 20 20        saq x20 20 20 20 3# x30        laq  2# x20 3#x20 3# x20 3# x30 3#x30       Hs curls  20 3#x20 3# x20 3# x30 3#x30       marching             Mini squats  20 20 20 20 x30 x30      Step up   4 inch   15  6 inch   20  6 inch   20  see    below        Hr/tr   hr 20  20 ea  20 ea  30 ea 30 x30      slr abd   stand   20  stand   20  stand   20  side   lying   20        Supine slr flex      2x10  20  x20       Cone taps    20 ea  20 ea  30 ea        clamshell supine      green   20 Black tb 5"x20 Black x30      SLR abd standing      3#x30 Blue x20      sls      15"x4       sidestepping      6ft x4 green tb  Green 12ft x6      sls        Green foam 30"x3      Ther Activity             SPC  3'           bike     6' 8' 8'      Step up      8 inch   20 8'x20 8"x20      Lateral step up       8"x20                   Gait Training                                       Modalities

## 2022-02-01 ENCOUNTER — OFFICE VISIT (OUTPATIENT)
Dept: PHYSICAL THERAPY | Facility: CLINIC | Age: 67
End: 2022-02-01
Payer: COMMERCIAL

## 2022-02-01 DIAGNOSIS — M25.552 LEFT HIP PAIN: Primary | ICD-10-CM

## 2022-02-01 PROCEDURE — 97530 THERAPEUTIC ACTIVITIES: CPT

## 2022-02-01 PROCEDURE — 97140 MANUAL THERAPY 1/> REGIONS: CPT

## 2022-02-01 PROCEDURE — 97110 THERAPEUTIC EXERCISES: CPT

## 2022-02-01 NOTE — PROGRESS NOTES
Daily Note     Today's date: 2022  Patient name: Sidney Jose  : 1955  MRN: [id number]  Referring provider: Carmella Mason MD  Dx:   Encounter Diagnosis     ICD-10-CM    1  Left hip pain  M25 552        Start Time: 1015  Stop Time: 1058  Total time in clinic (min): 43 minutes       Subjective: Patient reports her left hip is feeling good  Objective: See treatment diary below  Assessment: Patient is recovering well status post left CHI  Progression of therapeutic exercise program is tolerated well  Plan: Continue treatment as per PT plan of care         Precautions: L CHI 1/3/22      Manuals      prom kl JLW JLW JLW JLW kl kl JLW                                            Neuro Re-Ed                                                                                                        Ther Ex             Heel slides 5"x20 20 20          gastroc stretch strap 30"x4 manual           Ball squeeze 5"x20 5"x20 5"x20 5"x20  With bridge 5"x20 5"x30      bridge `  20 20 20   30     saq x20 20 20 20 3# x30        laq  2# x20 3#x20 3# x20 3# x30 3#x30       Hs curls  20 3#x20 3# x20 3# x30 3#x30       marching             Mini squats  20 20 20 20 x30 x30 30     Step up   4 inch   15  6 inch   20  6 inch   20  see    below        Hr/tr   hr 20  20 ea  20 ea  30 ea 30 x30  30 ea     slr abd   stand   20  stand   20  stand   20  side   lying   20        Supine slr flex      2x10  20  x20   3x10     Cone taps    20 ea  20 ea  30 ea        clamshell supine      green   20 Black tb 5"x20 Black x30  black   30     SLR abd standing      3#x30 Blue x20  blue   30     sidestepping      6ft x4 green tb  Green 12ft x6  blue   12ft x6     sls        15"x4 Green foam 30"x3  green foam   30"x3                  Ther Activity             SPC  3'           bike     6' 8' 8' 8'     Step up      8 inch   20 8'x20 8"x20  8"x25     Lateral step up       8"x20  8"x25 Gait Training                                       Modalities

## 2022-02-03 ENCOUNTER — OFFICE VISIT (OUTPATIENT)
Dept: PHYSICAL THERAPY | Facility: CLINIC | Age: 67
End: 2022-02-03
Payer: COMMERCIAL

## 2022-02-03 DIAGNOSIS — M25.552 LEFT HIP PAIN: Primary | ICD-10-CM

## 2022-02-03 PROCEDURE — 97110 THERAPEUTIC EXERCISES: CPT | Performed by: PHYSICAL THERAPIST

## 2022-02-03 PROCEDURE — 97140 MANUAL THERAPY 1/> REGIONS: CPT | Performed by: PHYSICAL THERAPIST

## 2022-02-03 PROCEDURE — 97530 THERAPEUTIC ACTIVITIES: CPT | Performed by: PHYSICAL THERAPIST

## 2022-02-03 NOTE — PROGRESS NOTES
Daily Note     Today's date: 2/3/2022  Patient name: Alida Durbin  : 1955  MRN: [id number]  Referring provider: Fiorella Moe MD  Dx:   Encounter Diagnosis     ICD-10-CM    1  Left hip pain  M25 552                      Subjective: Patient reports min pain upon arrival to PT  Objective: See treatment diary below  Assessment: progressed therex as listed, pt reports fatigue but denies pain continues to progress as mercy with goal of DC to hep next week  Plan: Continue treatment as per PT plan of care         Precautions: L CHI 1/3/22      Manuals 1/5 1/11 1/14 1/18 1/21 1/25 1/28 2/1 2/3    prom kl JLW JLW JLW JLW kl kl JLW kl                                           Neuro Re-Ed                                                                                                        Ther Ex             Heel slides 5"x20 20 20          gastroc stretch strap 30"x4 manual           Ball squeeze 5"x20 5"x20 5"x20 5"x20  With bridge 5"x20 5"x30      bridge `  20 20 20   30 30    saq x20 20 20 20 3# x30        laq  2# x20 3#x20 3# x20 3# x30 3#x30       Hs curls  20 3#x20 3# x20 3# x30 3#x30       marching             Mini squats  20 20 20 20 x30 x30 30 x30    Step up   4 inch   15  6 inch   20  6 inch   20  see    below        Hr/tr   hr 20  20 ea  20 ea  30 ea 30 x30  30 ea x30    slr abd   stand   20  stand   20  stand   20  side   lying   20        Supine slr flex      2x10  20  x20   3x10 30    Cone taps    20 ea  20 ea  30 ea        clamshell supine      green   20 Black tb 5"x20 Black x30  black   30 Black x30    SLR abd standing      3#x30 Blue x20  blue   30 Black x30    sidestepping      6ft x4 green tb  Green 12ft x6  blue   12ft x6 Blue 12ftx6    sls        15"x4 Green foam 30"x3  green foam   30"x3 Blue foam 30"x3    lunges         L only x15    Ther Activity             SPC  3'           bike     6' 8' 8' 8' 8'    Step up      8 inch   20 8'x20 8"x20  8"x25 8"x30    Lateral step up 8"x20  8"x25 8"x30                 Gait Training                                       Modalities

## 2022-02-08 ENCOUNTER — OFFICE VISIT (OUTPATIENT)
Dept: PHYSICAL THERAPY | Facility: CLINIC | Age: 67
End: 2022-02-08
Payer: COMMERCIAL

## 2022-02-08 DIAGNOSIS — M25.552 LEFT HIP PAIN: Primary | ICD-10-CM

## 2022-02-08 PROCEDURE — 97110 THERAPEUTIC EXERCISES: CPT

## 2022-02-08 PROCEDURE — 97140 MANUAL THERAPY 1/> REGIONS: CPT

## 2022-02-08 PROCEDURE — 97530 THERAPEUTIC ACTIVITIES: CPT

## 2022-02-08 NOTE — PROGRESS NOTES
Daily Note     Today's date: 2022  Patient name: Carmenza Wilkins  : 1955  MRN: [id number]  Referring provider: Eunice Sher MD  Dx:   Encounter Diagnosis     ICD-10-CM    1  Left hip pain  M25 552        Start Time: 932  Stop Time: 101  Total time in clinic (min): 43 minutes       Subjective: Patient reports her left hip is feeling good  Patient reports noticing increased left foot pronation  Objective: See treatment diary below  Assessment: Patient is doing well with therapeutic exercise program and will be ready to transition to independent HEP at the end of this week  Patient may benefit from wearing a sneaker with better arch support  Plan: Continue treatment as per PT plan of care         Precautions: L CHI 1/3/22      Manuals 1/5 1/11 1/14 1/18 1/21 1/25 1/28 2/1 2/3 2/8   prom kl JLW JLW JLW JLW kl kl JLW kl JLW                                          Neuro Re-Ed                                                                              Ther Ex             Heel slides 5"x20 20 20          gastroc stretch strap 30"x4 manual           Ball squeeze 5"x20 5"x20 5"x20 5"x20  With bridge 5"x20 5"x30      bridge `  20 20 20   30 30 30   saq x20 20 20 20 3# x30        laq  2# x20 3#x20 3# x20 3# x30 3#x30       Hs curls  20 3#x20 3# x20 3# x30 3#x30       marching             Mini squats  20 20 20 20 x30 x30 30 x30 30   Step up   4 inch   15  6 inch   20  6 inch   20  see    below        Hr/tr   hr 20  20 ea  20 ea  30 ea 30 x30  30 ea x30    slr abd   stand   20  stand   20  stand   20  side   lying   20        Supine slr flex      2x10  20  x20   3x10 30  1 5#   2x10   Cone taps    20 ea  20 ea  30 ea        clamshell supine      green   20 Black tb 5"x20 Black x30  black   30 Black x30  black   30   SLR abd standing      3#x30 Blue x20  blue   30 Black x30 sidelying  1 5#  2x10     sidestepping      6ft x4 green tb  Green 12ft x6  blue   12ft x6 Blue 12ftx6  blue   12ft x6 sls        15"x4 Green foam 30"x3  green foam   30"x3 Blue foam 30"x3  blue foam   30"x3   lunges         L only x15                 Ther Activity             SPC  3'           bike     6' 8' 8' 8' 8' 8'   Step up      8 inch   20 8'x20 8"x20  8"x25 8"x30  8" x30   Lateral step up       8"x20  8"x25 8"x30  8" x30                Gait Training                                       Modalities

## 2022-02-09 ENCOUNTER — OFFICE VISIT (OUTPATIENT)
Dept: FAMILY MEDICINE CLINIC | Facility: CLINIC | Age: 67
End: 2022-02-09
Payer: COMMERCIAL

## 2022-02-09 VITALS
TEMPERATURE: 97 F | HEIGHT: 62 IN | BODY MASS INDEX: 30.77 KG/M2 | SYSTOLIC BLOOD PRESSURE: 128 MMHG | WEIGHT: 167.2 LBS | HEART RATE: 84 BPM | OXYGEN SATURATION: 97 % | RESPIRATION RATE: 16 BRPM | DIASTOLIC BLOOD PRESSURE: 80 MMHG

## 2022-02-09 DIAGNOSIS — M25.551 CHRONIC RIGHT HIP PAIN: Primary | ICD-10-CM

## 2022-02-09 DIAGNOSIS — G89.29 CHRONIC RIGHT HIP PAIN: Primary | ICD-10-CM

## 2022-02-09 DIAGNOSIS — I10 PRIMARY HYPERTENSION: ICD-10-CM

## 2022-02-09 DIAGNOSIS — G89.29 CHRONIC LEFT-SIDED LOW BACK PAIN WITH LEFT-SIDED SCIATICA: ICD-10-CM

## 2022-02-09 DIAGNOSIS — M54.42 CHRONIC LEFT-SIDED LOW BACK PAIN WITH LEFT-SIDED SCIATICA: ICD-10-CM

## 2022-02-09 DIAGNOSIS — E78.00 PURE HYPERCHOLESTEROLEMIA: ICD-10-CM

## 2022-02-09 PROCEDURE — 99214 OFFICE O/P EST MOD 30 MIN: CPT | Performed by: FAMILY MEDICINE

## 2022-02-09 PROCEDURE — 3079F DIAST BP 80-89 MM HG: CPT | Performed by: FAMILY MEDICINE

## 2022-02-09 PROCEDURE — 3725F SCREEN DEPRESSION PERFORMED: CPT | Performed by: FAMILY MEDICINE

## 2022-02-09 PROCEDURE — 3074F SYST BP LT 130 MM HG: CPT | Performed by: FAMILY MEDICINE

## 2022-02-09 PROCEDURE — 1036F TOBACCO NON-USER: CPT | Performed by: FAMILY MEDICINE

## 2022-02-09 PROCEDURE — 3008F BODY MASS INDEX DOCD: CPT | Performed by: FAMILY MEDICINE

## 2022-02-09 RX ORDER — METHOCARBAMOL 500 MG/1
500 TABLET, FILM COATED ORAL 4 TIMES DAILY
COMMUNITY
Start: 2022-01-04 | End: 2022-02-09 | Stop reason: ALTCHOICE

## 2022-02-09 RX ORDER — CELECOXIB 200 MG/1
200 CAPSULE ORAL DAILY PRN
Qty: 90 CAPSULE | Refills: 0 | Status: SHIPPED | OUTPATIENT
Start: 2022-02-09 | End: 2022-08-03

## 2022-02-09 NOTE — PROGRESS NOTES
Assessment/Plan:    Chronic right hip pain  Sees Dr Maile Ly at [place] [address] for this   ? Future surgery   celebrax as needed     Hypertension  Stable on current meds   Continue as directed    Chronic left-sided low back pain with left-sided sciatica  Improved after her hip surgery   Continue to monitor    Pure hypercholesterolemia  On Red yeast rice and Omega 3  Continue to monitor        Diagnoses and all orders for this visit:    Chronic right hip pain  -     celecoxib (CeleBREX) 200 mg capsule; Take 1 capsule (200 mg total) by mouth daily as needed for mild pain    Primary hypertension    Chronic left-sided low back pain with left-sided sciatica    Pure hypercholesterolemia    Other orders  -     Discontinue: methocarbamol (ROBAXIN) 500 mg tablet; Take 500 mg by mouth 4 (four) times a day (Patient not taking: Reported on 2/9/2022 )  -     BABY ASPIRIN PO; Take 81 mg by mouth 2 (two) times a day          Subjective:      Patient ID: Ruth Lewis is a 61 y o  female  HPI  Here for follow up  Had left hip replacement last month   Feeling well, done with PT  Right hip pain is still there and not ready for surgery yet  celebrax helped in the past   Trouble losing weight     The following portions of the patient's history were reviewed and updated as appropriate: allergies, current medications, past family history, past medical history, past social history, past surgical history and problem list     Review of Systems   Constitutional: Negative  Respiratory: Negative  Cardiovascular: Negative  Genitourinary: Negative  Musculoskeletal: Positive for arthralgias  Allergic/Immunologic: Negative  Neurological: Negative  Hematological: Negative  Psychiatric/Behavioral: Negative            Objective:      /80 (BP Location: Left arm, Patient Position: Sitting, Cuff Size: Adult)   Pulse 84   Temp (!) 97 °F (36 1 °C) (Skin)   Resp 16   Ht 5' 1 81" (1 57 m)   Wt 75 8 kg (167 lb 3 2 oz)   SpO2 97% BMI 30 77 kg/m²          Physical Exam  Vitals reviewed  Constitutional:       Appearance: Normal appearance  Cardiovascular:      Pulses: Normal pulses  Heart sounds: Normal heart sounds  Neurological:      General: No focal deficit present  Mental Status: She is alert and oriented to person, place, and time     Psychiatric:         Mood and Affect: Mood normal          Behavior: Behavior normal

## 2022-02-10 ENCOUNTER — OFFICE VISIT (OUTPATIENT)
Dept: PHYSICAL THERAPY | Facility: CLINIC | Age: 67
End: 2022-02-10
Payer: COMMERCIAL

## 2022-02-10 DIAGNOSIS — M25.552 LEFT HIP PAIN: Primary | ICD-10-CM

## 2022-02-10 PROCEDURE — 97110 THERAPEUTIC EXERCISES: CPT

## 2022-02-10 PROCEDURE — 97140 MANUAL THERAPY 1/> REGIONS: CPT

## 2022-02-10 PROCEDURE — 97530 THERAPEUTIC ACTIVITIES: CPT

## 2022-02-10 NOTE — PROGRESS NOTES
Daily Note/DC Summary     Today's date: 2/10/2022  Patient name: Michael Fraser  : 1955  MRN: [id number]  Referring provider: Toni Nogueira MD  Dx:   Encounter Diagnosis     ICD-10-CM    1  Left hip pain  M25 552        Start Time: 1015  Stop Time: 1100  Total time in clinic (min): 45 minutes       Subjective: Patient offers no complaints  Patient reports she was prescribed Celebrex by her PCP to take as needed  Objective: See treatment diary below  Assessment: Patient has recovered well since having L CHI and then attending outpatient PT  Patient is doing well with therapeutic exercise program and is encouraged to remain compliant with HEP  Plan: Discharge from outpatient PT         Precautions: L CHI 1/3/22      Manuals  2/10  1/14 1/18 1/21 1/25 1/28 2/1 2/3 2/8   L hip prom JLW  JLW JLW JLW kl kl JLW kl JLW                                          Neuro Re-Ed                                                                 Ther Ex             Heel slides   20          gastroc stretch             Ball squeeze   5"x20 5"x20  With bridge 5"x20 5"x30      bridge   20 20 20   30 30 30   saq   20 20 3# x30        laq   3#x20 3# x20 3# x30 3#x30       Hs curls   3#x20 3# x20 3# x30 3#x30       marching             Mini squats 30  20 20 20 x30 x30 30 x30 30   Step up see below   6 inch   20  6 inch   20  see    below        Hr/tr    20 ea  20 ea  30 ea 30 x30  30 ea x30    slr abd    stand   20  stand   20  side   lying   20        Supine slr flex 1 5#  30     2x10  20  x20   3x10 30  1 5#   2x10   Cone taps    20 ea  20 ea  30 ea        clamshell supine side  lying  black  30     green   20 Black tb 5"x20 Black x30  black   30 Black x30  black   30   SLR abd standing side  lying  1 5#  30     3#x30 Blue x20  blue   30 Black x30 sidelying  1 5#  2x10     sidestepping blue  12ft x8     6ft x4 green tb  Green 12ft x6  blue   12ft x6 Blue 12ftx6  blue   12ft x6   sls  blue foam  30"x3      15"x4 Green foam 30"x3  green foam   30"x3 Blue foam 30"x3  blue foam   30"x3   lunges L only  15        L only x15                 Ther Activity             SPC             bike  8'    6' 8' 8' 8' 8' 8'   Step up 8" x30     8 inch   20 8'x20 8"x20  8"x25 8"x30  8" x30   Lateral step up 8" x30      8"x20  8"x25 8"x30  8" x30                Gait Training                                       Modalities

## 2022-03-30 ENCOUNTER — APPOINTMENT (OUTPATIENT)
Dept: LAB | Age: 67
End: 2022-03-30
Payer: COMMERCIAL

## 2022-03-30 DIAGNOSIS — Z01.89 LABORATORY PROCEDURE: ICD-10-CM

## 2022-03-30 DIAGNOSIS — Z01.818 PRE-OP TESTING: ICD-10-CM

## 2022-03-30 LAB
ALBUMIN SERPL BCP-MCNC: 3.8 G/DL (ref 3.5–5)
ALP SERPL-CCNC: 131 U/L (ref 46–116)
ALT SERPL W P-5'-P-CCNC: 45 U/L (ref 12–78)
ANION GAP SERPL CALCULATED.3IONS-SCNC: 5 MMOL/L (ref 4–13)
AST SERPL W P-5'-P-CCNC: 30 U/L (ref 5–45)
BASOPHILS # BLD AUTO: 0.05 THOUSANDS/ΜL (ref 0–0.1)
BASOPHILS NFR BLD AUTO: 1 % (ref 0–1)
BILIRUB SERPL-MCNC: 0.64 MG/DL (ref 0.2–1)
BUN SERPL-MCNC: 15 MG/DL (ref 5–25)
CALCIUM SERPL-MCNC: 9.6 MG/DL (ref 8.3–10.1)
CHLORIDE SERPL-SCNC: 102 MMOL/L (ref 100–108)
CO2 SERPL-SCNC: 30 MMOL/L (ref 21–32)
CREAT SERPL-MCNC: 1.08 MG/DL (ref 0.6–1.3)
EOSINOPHIL # BLD AUTO: 0.19 THOUSAND/ΜL (ref 0–0.61)
EOSINOPHIL NFR BLD AUTO: 4 % (ref 0–6)
ERYTHROCYTE [DISTWIDTH] IN BLOOD BY AUTOMATED COUNT: 11.9 % (ref 11.6–15.1)
GFR SERPL CREATININE-BSD FRML MDRD: 54 ML/MIN/1.73SQ M
GLUCOSE P FAST SERPL-MCNC: 95 MG/DL (ref 65–99)
HCT VFR BLD AUTO: 46.5 % (ref 34.8–46.1)
HGB BLD-MCNC: 15.4 G/DL (ref 11.5–15.4)
IMM GRANULOCYTES # BLD AUTO: 0 THOUSAND/UL (ref 0–0.2)
IMM GRANULOCYTES NFR BLD AUTO: 0 % (ref 0–2)
LYMPHOCYTES # BLD AUTO: 2.35 THOUSANDS/ΜL (ref 0.6–4.47)
LYMPHOCYTES NFR BLD AUTO: 45 % (ref 14–44)
MCH RBC QN AUTO: 30 PG (ref 26.8–34.3)
MCHC RBC AUTO-ENTMCNC: 33.1 G/DL (ref 31.4–37.4)
MCV RBC AUTO: 91 FL (ref 82–98)
MONOCYTES # BLD AUTO: 0.4 THOUSAND/ΜL (ref 0.17–1.22)
MONOCYTES NFR BLD AUTO: 8 % (ref 4–12)
NEUTROPHILS # BLD AUTO: 2.13 THOUSANDS/ΜL (ref 1.85–7.62)
NEUTS SEG NFR BLD AUTO: 42 % (ref 43–75)
NRBC BLD AUTO-RTO: 0 /100 WBCS
PLATELET # BLD AUTO: 306 THOUSANDS/UL (ref 149–390)
PMV BLD AUTO: 9.5 FL (ref 8.9–12.7)
POTASSIUM SERPL-SCNC: 4.2 MMOL/L (ref 3.5–5.3)
PROT SERPL-MCNC: 7.4 G/DL (ref 6.4–8.2)
RBC # BLD AUTO: 5.14 MILLION/UL (ref 3.81–5.12)
SODIUM SERPL-SCNC: 137 MMOL/L (ref 136–145)
WBC # BLD AUTO: 5.12 THOUSAND/UL (ref 4.31–10.16)

## 2022-03-30 PROCEDURE — 36415 COLL VENOUS BLD VENIPUNCTURE: CPT

## 2022-03-30 PROCEDURE — 80053 COMPREHEN METABOLIC PANEL: CPT

## 2022-03-30 PROCEDURE — 85025 COMPLETE CBC W/AUTO DIFF WBC: CPT

## 2022-03-31 ENCOUNTER — TELEPHONE (OUTPATIENT)
Dept: FAMILY MEDICINE CLINIC | Facility: CLINIC | Age: 67
End: 2022-03-31

## 2022-04-01 ENCOUNTER — OFFICE VISIT (OUTPATIENT)
Dept: FAMILY MEDICINE CLINIC | Facility: CLINIC | Age: 67
End: 2022-04-01
Payer: COMMERCIAL

## 2022-04-01 ENCOUNTER — TELEPHONE (OUTPATIENT)
Dept: FAMILY MEDICINE CLINIC | Facility: CLINIC | Age: 67
End: 2022-04-01

## 2022-04-01 VITALS
RESPIRATION RATE: 16 BRPM | HEIGHT: 62 IN | DIASTOLIC BLOOD PRESSURE: 80 MMHG | OXYGEN SATURATION: 98 % | WEIGHT: 166.4 LBS | BODY MASS INDEX: 30.62 KG/M2 | HEART RATE: 89 BPM | TEMPERATURE: 97.6 F | SYSTOLIC BLOOD PRESSURE: 140 MMHG

## 2022-04-01 DIAGNOSIS — Z12.31 VISIT FOR SCREENING MAMMOGRAM: ICD-10-CM

## 2022-04-01 DIAGNOSIS — I10 PRIMARY HYPERTENSION: ICD-10-CM

## 2022-04-01 DIAGNOSIS — M16.11 PRIMARY OSTEOARTHRITIS OF RIGHT HIP: ICD-10-CM

## 2022-04-01 DIAGNOSIS — Z01.818 PRE-OP EVALUATION: Primary | ICD-10-CM

## 2022-04-01 PROCEDURE — 3008F BODY MASS INDEX DOCD: CPT | Performed by: FAMILY MEDICINE

## 2022-04-01 PROCEDURE — 1036F TOBACCO NON-USER: CPT | Performed by: FAMILY MEDICINE

## 2022-04-01 PROCEDURE — 99213 OFFICE O/P EST LOW 20 MIN: CPT | Performed by: FAMILY MEDICINE

## 2022-04-01 PROCEDURE — 3079F DIAST BP 80-89 MM HG: CPT | Performed by: FAMILY MEDICINE

## 2022-04-01 PROCEDURE — 3077F SYST BP >= 140 MM HG: CPT | Performed by: FAMILY MEDICINE

## 2022-04-01 RX ORDER — OLMESARTAN MEDOXOMIL 20 MG/1
20 TABLET ORAL DAILY
Qty: 90 TABLET | Refills: 0 | Status: SHIPPED | OUTPATIENT
Start: 2022-04-01 | End: 2022-05-17 | Stop reason: SDUPTHER

## 2022-04-01 RX ORDER — AMLODIPINE BESYLATE 2.5 MG/1
2.5 TABLET ORAL DAILY
Qty: 90 TABLET | Refills: 3 | Status: SHIPPED | OUTPATIENT
Start: 2022-04-01

## 2022-04-01 NOTE — PROGRESS NOTES
Subjective:      Faith Fernández is a 61 y o  female who presents to the office today for a preoperative consultation at the request of surgeon Dr Vnice Daley who plans on performing right total hip replacement  on April 18, 2022  This consultation is requested for the specific conditions prompting preoperative evaluation (i e  because of potential affect on operative risk): hypertension  Planned anesthesia is epidural and general  The patient has the following known anesthesia issues: none  Patient has a bleeding risk of: no recent abnormal bleeding  Patient does not have objections to receiving blood products if needed      The following portions of the patient's history were reviewed and updated as appropriate: allergies, current medications, past family history, past medical history, past social history, past surgical history and problem list     Review of Systems  Constitutional: negative  Eyes: negative  Ears, nose, mouth, throat, and face: negative  Respiratory: negative  Cardiovascular: negative  Gastrointestinal: negative  Genitourinary:negative  Integument/breast: negative  Hematologic/lymphatic: negative  Musculoskeletal:negative  Neurological: negative  Behavioral/Psych: negative  Endocrine: negative  Allergic/Immunologic: negative       Objective:      Physical Exam  /80 (BP Location: Left arm, Patient Position: Sitting, Cuff Size: Adult)   Pulse 89   Temp 97 6 °F (36 4 °C) (Skin)   Resp 16   Ht 5' 1 77" (1 569 m)   Wt 75 5 kg (166 lb 6 4 oz)   SpO2 98%   BMI 30 66 kg/m²     General Appearance:    Alert, cooperative, no distress, appears stated age   Head:    Normocephalic, without obvious abnormality, atraumatic   Eyes:    PERRL, conjunctiva/corneas clear, EOM's intact, fundi     benign, both eyes   Ears:    Normal TM's and external ear canals, both ears   Nose:   Nares normal, septum midline, mucosa normal, no drainage    or sinus tenderness   Throat:   Lips, mucosa, and tongue normal; teeth and gums normal   Neck:   Supple, symmetrical, trachea midline, no adenopathy;     thyroid:  no enlargement/tenderness/nodules; no carotid    bruit or JVD   Back:     Symmetric, no curvature, ROM normal, no CVA tenderness   Lungs:     Clear to auscultation bilaterally, respirations unlabored   Chest Wall:    No tenderness or deformity    Heart:    Regular rate and rhythm, S1 and S2 normal, no murmur, rub   or gallop       Abdomen:     Soft, non-tender, bowel sounds active all four quadrants,     no masses, no organomegaly           Extremities:   Extremities normal, atraumatic, no cyanosis or edema   Pulses:   2+ and symmetric all extremities   Skin:   Skin color, texture, turgor normal, no rashes or lesions   Lymph nodes:   Cervical, supraclavicular, and axillary nodes normal   Neurologic:   CNII-XII intact, normal strength, sensation and reflexes     throughout       Predictors of intubation difficulty:   Morbid obesity? no   Anatomically abnormal facies? no   Prominent incisors? no   Receding mandible? no   Short, thick neck? no   Neck range of motion: normal   Mallampati score: I (soft palate, uvula, fauces, and tonsillar pillars visible)   Thyromental distance: < 6cm     Dentition: No chipped, loose, or missing teeth      Cardiographics  ECG: normal sinus rhythm, no blocks or conduction defects, no ischemic changes 12/28/2021  Echocardiogram: not done    Imaging  Chest x-ray: normal     Lab Review   Transcribe Orders on 03/30/2022   Component Date Value    Color, UA 03/30/2022 Light Yellow     Clarity, UA 03/30/2022 Clear     Specific Gravity, UA 03/30/2022 1 010     pH, UA 03/30/2022 6 0     Leukocytes, UA 03/30/2022 Negative     Nitrite, UA 03/30/2022 Negative     Protein, UA 03/30/2022 Negative     Glucose, UA 03/30/2022 Negative     Ketones, UA 03/30/2022 Negative     Urobilinogen, UA 03/30/2022 <2 0     Bilirubin, UA 03/30/2022 Negative     Blood, UA 03/30/2022 Trace*    RBC, UA 03/30/2022 1-2     WBC, UA 03/30/2022 None Seen     Epithelial Cells 03/30/2022 Occasional     Bacteria, UA 03/30/2022 None Seen     MUCUS THREADS 03/30/2022 Occasional*   Appointment on 03/30/2022   Component Date Value    WBC 03/30/2022 5 12     RBC 03/30/2022 5 14*    Hemoglobin 03/30/2022 15 4     Hematocrit 03/30/2022 46 5*    MCV 03/30/2022 91     MCH 03/30/2022 30 0     MCHC 03/30/2022 33 1     RDW 03/30/2022 11 9     MPV 03/30/2022 9 5     Platelets 28/54/3381 306     nRBC 03/30/2022 0     Neutrophils Relative 03/30/2022 42*    Immat GRANS % 03/30/2022 0     Lymphocytes Relative 03/30/2022 45*    Monocytes Relative 03/30/2022 8     Eosinophils Relative 03/30/2022 4     Basophils Relative 03/30/2022 1     Neutrophils Absolute 03/30/2022 2 13     Immature Grans Absolute 03/30/2022 0 00     Lymphocytes Absolute 03/30/2022 2 35     Monocytes Absolute 03/30/2022 0 40     Eosinophils Absolute 03/30/2022 0 19     Basophils Absolute 03/30/2022 0 05     Sodium 03/30/2022 137     Potassium 03/30/2022 4 2     Chloride 03/30/2022 102     CO2 03/30/2022 30     ANION GAP 03/30/2022 5     BUN 03/30/2022 15     Creatinine 03/30/2022 1 08     Glucose, Fasting 03/30/2022 95     Calcium 03/30/2022 9 6     AST 03/30/2022 30     ALT 03/30/2022 45     Alkaline Phosphatase 03/30/2022 131*    Total Protein 03/30/2022 7 4     Albumin 03/30/2022 3 8     Total Bilirubin 03/30/2022 0 64     eGFR 03/30/2022 54           Assessment:      61 y o  female with planned surgery as above  Known risk factors for perioperative complications: None    Difficulty with intubation is not anticipated  Cardiac Risk Estimation: per the Revised Cardiac Risk Index (Circ  100:1043, 1999), the patient's risk factors for cardiac complications include none, putting her in: RCI RISK CLASS I (0 risk factors, risk of major cardiac compl  appr   0 5%)    Current medications which may produce withdrawal symptoms if withheld perioperatively: none  Plan:   Emili Sen was seen today for pre-op clearance  Diagnoses and all orders for this visit:    Pre-op evaluation    Primary osteoarthritis of right hip  Comments:  cleared for surgery as planned on 4/18/2022    Primary hypertension  Comments:  not controlled   added amlodipine 2 5 mg daily today   to recheck BP next week  stress reduction disucssed   Orders:  -     amLODIPine (NORVASC) 2 5 mg tablet; Take 1 tablet (2 5 mg total) by mouth daily    Visit for screening mammogram  -     Mammo screening bilateral w 3d & cad; Future       1  Preoperative workup as follows chest x-ray, hemoglobin, hematocrit, electrolytes, creatinine, glucose, liver function studies  2  Change in medication regimen before surgery: none, continue medication regimen including morning of surgery, with sip of water  3  Prophylaxis for cardiac events with perioperative beta-blockers: should be considered, specific regimen per anesthesia  4  Invasive hemodynamic monitoring perioperatively: at the discretion of anesthesiologist   5  Deep vein thrombosis prophylaxis postoperatively:regimen to be chosen by surgical team   6  Surveillance for postoperative MI with ECG immediately postoperatively and on postoperative days 1 and 2 AND troponin levels 24 hours postoperatively and on day 4 or hospital discharge (whichever comes first): not indicated  7  Other measures: Preoperative alcohol abstention x 30 days

## 2022-04-01 NOTE — TELEPHONE ENCOUNTER
Medication: olmesartan (BENICAR) 20 mg tablet   How Often:  Take 1 tablet (20 mg total) by mouth daily  Quantity:  90  Last Office Visit: 04/01/2022  Next Office Visit:  Last refilled:   How many pills left:  Pharmacy:   [facility]/pharmacy #[id number] [address] [place] [address]  Phone: [phone] Fax: [phone]

## 2022-04-07 ENCOUNTER — CLINICAL SUPPORT (OUTPATIENT)
Dept: FAMILY MEDICINE CLINIC | Facility: CLINIC | Age: 67
End: 2022-04-07

## 2022-04-07 VITALS — SYSTOLIC BLOOD PRESSURE: 140 MMHG | DIASTOLIC BLOOD PRESSURE: 80 MMHG

## 2022-04-07 DIAGNOSIS — I10 PRIMARY HYPERTENSION: Primary | ICD-10-CM

## 2022-04-07 NOTE — PROGRESS NOTES
Assessment/Plan:    No problem-specific Assessment & Plan notes found for this encounter  There are no diagnoses linked to this encounter  Subjective:      Patient ID: Alan Kamara is a 61 y o  female  HPI    Patient here for Blood Pressure check    Review of Systems      Objective: There were no vitals taken for this visit           Physical Exam

## 2022-04-20 ENCOUNTER — EVALUATION (OUTPATIENT)
Dept: PHYSICAL THERAPY | Facility: CLINIC | Age: 67
End: 2022-04-20
Payer: COMMERCIAL

## 2022-04-20 DIAGNOSIS — M25.551 ACUTE PAIN OF RIGHT HIP: Primary | ICD-10-CM

## 2022-04-20 PROCEDURE — 97140 MANUAL THERAPY 1/> REGIONS: CPT | Performed by: PHYSICAL THERAPIST

## 2022-04-20 PROCEDURE — 97161 PT EVAL LOW COMPLEX 20 MIN: CPT | Performed by: PHYSICAL THERAPIST

## 2022-04-20 NOTE — LETTER
[id number]    Jer Conde MD  [address] [place] [address]    Patient: Dago Mcfarland   YOB: 1955   Date of Visit: 2022     Encounter Diagnosis     ICD-10-CM    1  Acute pain of right hip  M25 551        Dear Dr Aleksandr Pruett: Thank you for your recent referral of Dago Mcfarland  Please review the attached evaluation summary from Yamini's recent visit  Please verify that you agree with the plan of care by signing the attached order  If you have any questions or concerns, please do not hesitate to call  I sincerely appreciate the opportunity to share in the care of one of your patients and hope to have another opportunity to work with you in the near future  Sincerely,    Tammy Godinez, PT      Referring Provider:      I certify that I have read the below Plan of Care and certify the need for these services furnished under this plan of treatment while under my care  Jer Conde MD  [facility]  Via Fax: [phone]          PT Evaluation     Today's date: 2022  Patient name: Dago Mcfarland  : 1955  MRN: [id number]  Referring provider: Shayy Lewis MD  Dx:   Encounter Diagnosis     ICD-10-CM    1  Acute pain of right hip  M25 551                   Assessment  Assessment details: Pt is a 61 y o  female who presents to outpatient PT with pain, decreased rom, decreased strength and decreased functional mobility  She will benefit from skilled PT to address these deficits in order to achieve her goals and maximize her functional mobility  Goals  Short Term Goals: Independent performance of initial hep  Decrease pain 2 points on VAS      Long Term Goals:   Independent performance of comprehensive hep  Work performance is returned to max level of function  Performance of IADL's is returned to max level of function  Performance in recreational activities is improved to max level of function  Able to walk community distances with no AD  Able to climb stairs with reciprocal gait pattern and single rail  Plan  Planned therapy interventions: IADL retraining, joint mobilization, manual therapy, massage, patient education, postural training, strengthening, stretching, therapeutic activities, therapeutic exercise, therapeutic training, transfer training, balance/weight bearing training and home exercise program  Frequency: 2x week  Duration in weeks: 8        Subjective Evaluation    History of Present Illness  Mechanism of injury: Pt underwent R CHI on 22  Reports that her pain is controlled with tramadol has not require oxycodone  Is currently ambulating with a RW  Reports that she is having difficulty lifting her leg into/out of her car and bad  Reports that she has a first floor set up currently  Reports that she would like to return to walking for reaction and playing with grandchildren  History of chronic lumbar and knee pain       Pain  Current pain ratin  At best pain ratin  At worst pain ratin    Patient Goals  Patient goals for therapy: decreased edema, decreased pain, increased motion, increased strength and independence with ADLs/IADLs          Objective     R hip:    ROM:    Flex:90  Abd: 10        Strength:    Flex: 3 /5  Abd: 3/5  Ir: 4/5  Er: 3/5  Ext: 3/5    Unable to perform SLR without extensor lag  Mod edema noted, compression garment is in place  Ambulates with antalgic gait pattern  Neg howman's sign               Precautions: R CHI 22, L CHI      Manuals             prom kl                                                   Neuro Re-Ed                                                                                                        Ther Ex             Heel slides             gastroc stretch             Ball squeeze             saq             laq             Hs curls             marching             Mini squats Step up             Hr/tr             slr abd             Cone taps                          Ther Activity                                       Gait Training                                       Modalities

## 2022-04-20 NOTE — PROGRESS NOTES
PT Evaluation     Today's date: 2022  Patient name: Zohreh Carreon  : 1955  MRN: [id number]  Referring provider: Awilda Atwood MD  Dx:   Encounter Diagnosis     ICD-10-CM    1  Acute pain of right hip  M25 551                   Assessment  Assessment details: Pt is a 61 y o  female who presents to outpatient PT with pain, decreased rom, decreased strength and decreased functional mobility  She will benefit from skilled PT to address these deficits in order to achieve her goals and maximize her functional mobility  Goals  Short Term Goals: Independent performance of initial hep  Decrease pain 2 points on VAS      Long Term Goals: Independent performance of comprehensive hep  Work performance is returned to max level of function  Performance of IADL's is returned to max level of function  Performance in recreational activities is improved to max level of function  Able to walk community distances with no AD  Able to climb stairs with reciprocal gait pattern and single rail  Plan  Planned therapy interventions: IADL retraining, joint mobilization, manual therapy, massage, patient education, postural training, strengthening, stretching, therapeutic activities, therapeutic exercise, therapeutic training, transfer training, balance/weight bearing training and home exercise program  Frequency: 2x week  Duration in weeks: 8        Subjective Evaluation    History of Present Illness  Mechanism of injury: Pt underwent R CHI on 22  Reports that her pain is controlled with tramadol has not require oxycodone  Is currently ambulating with a RW  Reports that she is having difficulty lifting her leg into/out of her car and bad  Reports that she has a first floor set up currently  Reports that she would like to return to walking for reaction and playing with grandchildren  History of chronic lumbar and knee pain       Pain  Current pain ratin  At best pain ratin  At worst pain eMERGENCY dEPARTMENT eNCOUnter      CHIEF COMPLAINT  Chief Complaint   Patient presents with   • Respiratory Distress Pediatric       HPI  Stveen Garcia is a 8 year old male who is brought to ER by parents with the c/o cough for couple days.  No fever.  Mom says cough sounds like croup.  Cough got worse tonight, now difficulty breathing.  Pt saw PMD earlier today and was told that he just had URI.  Pt was given ibuprofen earlier today.    Historian was the pts parents.    ALLERGIES  ALLERGIES:  No Known Allergies    CURRENT MEDICATIONS  No current facility-administered medications for this encounter.      No current outpatient prescriptions on file.       PAST MEDICAL HISTORY  Past Medical History:   Diagnosis Date   • Prematurity, 1,000-1,249 grams, 24 completed weeks        SURGICAL HISTORY  Past Surgical History:   Procedure Laterality Date   • Inguinal hernia repair     • Tonsillectomy and adenoidectomy         SOCIAL HISTORY  Child is meeting all age-appropriate mile stones.    FAMILY HISTORY  No family history on file.    REVIEW OF SYSTEMS:  As per history of present illness and below, otherwise negative.  Constitutional:  No fevers.  HENT:  No earache, nasal congestion or sore throat.  Respiratory:  As per history of present illness  GI:  No abdominal pain, vomiting, or diarrhea.  Skin:  No rash.    PHYSICAL EXAM  ED Triage Vitals [05/29/18 1944]   /82   Heart Rate 125   Resp 30   Temp 98.8 °F (37.1 °C)   SpO2 95 %     Pulse Ox Interpretation:  Within normal limits  Constitutional:  Well-developed, well-nourished, child who is demonstrating a very hoarse croup-like cough with stridor.  Eyes:  PERRL, conjunctivae normal.  HENT:  Atraumatic. External ears normal. Tympanic membranes normal. Nose normal. Oropharynx moist without erythema, edema, exudate. Uvula is in midline without edema. He is swallowing and tolerating secretions without difficulty.  Neck:  Normal range of motion, no tenderness,  ratin    Patient Goals  Patient goals for therapy: decreased edema, decreased pain, increased motion, increased strength and independence with ADLs/IADLs          Objective     R hip:    ROM:    Flex:90  Abd: 10        Strength:    Flex: 3 /5  Abd: 3/5  Ir:   Er: 3/5  Ext: 3/5    Unable to perform SLR without extensor lag  Mod edema noted, compression garment is in place  Ambulates with antalgic gait pattern  Neg howman's sign               Precautions: R CHI 22, L CHI      Manuals             prom kl                                                   Neuro Re-Ed                                                                                                        Ther Ex             Heel slides             gastroc stretch             Ball squeeze             saq             laq             Hs curls             marching             Mini squats             Step up             Hr/tr             slr abd             Cone taps                          Ther Activity                                       Gait Training                                       Modalities supple.  Respiratory:  Harsh barky croup-like cough noted. Stridor noted with heavy breathing. He has tachypnea. No actual wheezing noted.  Cardiovascular:  Normal rate, normal rhythm, no murmurs.  GI:  Soft, nondistended, normal bowel sounds, nontender, no organomegaly, no mass, no guarding.  Musculoskeletal:  No edema, no tenderness, no deformities. Back - no tenderness.  Integument:  No rash  Lymphatic:  No cervical or submandibular adenopathy.  Neurologic:  Alert, strength and tone normal.    RADIOLOGY  XR CHEST AP OR PA   Final Result   IMPRESSION: No gross infiltrates. Slight prominence of bronchovascular   markings, more so in the left perihilar region.             LABS  No results found for this visit on 05/29/18.      ED COURSE & MEDICAL DECISION MAKING  Pt presents with croup like cough and stridor.  We will initiate racemic epi and decadron.    Pt had immediate relief from racemic epinephrine.  Will admin decadron and check cxr.    9:58 PM Pt feeling much better.  On repeat exam, there is no longer any wheezing or stridor.  CXR is negative for PNA.  Pt felt to be stable for discharge home.  Discussed home management of croup with parents.  Return to ER for increased difficulty breathing.  F/u with PMD.    Diagnosis  The primary encounter diagnosis was Croup. A diagnosis of Stridor was also pertinent to this visit.    Follow Up:  Pamela Conner MD  [address]  [address]  [facility] [address] [phone]    Schedule an appointment as soon as possible for a visit  for recheck of symptoms       New Prescriptions    No medications on file       Pt is discharged to home/self care in stable condition.      Breanne Yee PA-C  05/29/18 [id number]

## 2022-04-22 ENCOUNTER — OFFICE VISIT (OUTPATIENT)
Dept: PHYSICAL THERAPY | Facility: CLINIC | Age: 67
End: 2022-04-22
Payer: COMMERCIAL

## 2022-04-22 DIAGNOSIS — M25.551 ACUTE PAIN OF RIGHT HIP: Primary | ICD-10-CM

## 2022-04-22 PROCEDURE — 97110 THERAPEUTIC EXERCISES: CPT | Performed by: PHYSICAL THERAPIST

## 2022-04-22 PROCEDURE — 97140 MANUAL THERAPY 1/> REGIONS: CPT | Performed by: PHYSICAL THERAPIST

## 2022-04-22 NOTE — PROGRESS NOTES
Daily Note     Today's date: 2022  Patient name: Yosvany Bassett  : 1955  MRN: [id number]  Referring provider: Js Hay MD  Dx:   Encounter Diagnosis     ICD-10-CM    1  Acute pain of right hip  M25 551                   Subjective: pt reports compliance with her hep and that she is having no difficulty with it  Objective: See treatment diary below      Assessment: progressed therex as listed with no complaints other then fatigue  Monitor response and progress as tolerated NV  Good tolerance to manual tx  Plan: Continue per plan of care        Precautions: R CHI 22, L CHI      Manuals            prom kl kl                                                  Neuro Re-Ed                                                                                                        Ther Ex             Heel slides             gastroc stretch             Ball squeeze  5"x20           saq             laq  5"x20           Hs curls  20           marching  Cone taps x20           Mini squats  x20           Step up             Hr/tr  x20ea           slr abd  standing x20           Cone taps  20                        Ther Activity                                       Gait Training                                       Modalities

## 2022-04-26 ENCOUNTER — OFFICE VISIT (OUTPATIENT)
Dept: PHYSICAL THERAPY | Facility: CLINIC | Age: 67
End: 2022-04-26
Payer: COMMERCIAL

## 2022-04-26 DIAGNOSIS — M25.551 ACUTE PAIN OF RIGHT HIP: Primary | ICD-10-CM

## 2022-04-26 PROCEDURE — 97140 MANUAL THERAPY 1/> REGIONS: CPT | Performed by: PHYSICAL THERAPIST

## 2022-04-26 PROCEDURE — 97110 THERAPEUTIC EXERCISES: CPT | Performed by: PHYSICAL THERAPIST

## 2022-04-26 NOTE — PROGRESS NOTES
Daily Note     Today's date: 2022  Patient name: Malcom Moore  : 1955  MRN: [id number]  Referring provider: Chen Soria MD  Dx:   Encounter Diagnosis     ICD-10-CM    1  Acute pain of right hip  M25 551                   Subjective: pt reports that she removed her post-op bandages yesterday per her post-op instructions  Reports that she was concerned b/c she notices some drainage at the time and has had occasional drainage today      Objective: See treatment diary below      Assessment: inspection of the incision site reveals mod erythema that seems to be irritation from removal of the bandages  There is a small area in the proximal incision that is draining small amount of clear fluid  Proceed through tx today with no complaints other then fatigue  Recheck of incision post-tx revealed no further drainage  Instructed pt to continue to monitor incision site t/o the day and drainage fails to resolve, drainage is no longer clear, erythema increases or if she experiences increased pain call her surgeons office to for af/u  Plan: Continue per plan of care        Precautions: R CHI 22, L CHI      Manuals            prom kl kl                                                  Neuro Re-Ed                                                                                                        Ther Ex             Heel slides             gastroc stretch             Ball squeeze  5"x20 5"x20          saq   5:x20          laq  5"x20           Hs curls  20 x20          marching  Cone taps x20           Mini squats  x20 x20          Step up   6"x10          Hr/tr  x20ea x20          slr abd  standing x20 x20          Cone taps  20 x20          bridges   x20          Ther Activity                                       Gait Training                                       Modalities

## 2022-04-29 ENCOUNTER — OFFICE VISIT (OUTPATIENT)
Dept: PHYSICAL THERAPY | Facility: CLINIC | Age: 67
End: 2022-04-29
Payer: COMMERCIAL

## 2022-04-29 DIAGNOSIS — M25.551 ACUTE PAIN OF RIGHT HIP: Primary | ICD-10-CM

## 2022-04-29 PROCEDURE — 97140 MANUAL THERAPY 1/> REGIONS: CPT

## 2022-04-29 PROCEDURE — 97110 THERAPEUTIC EXERCISES: CPT

## 2022-04-29 NOTE — PROGRESS NOTES
Daily Note     Today's date: 2022  Patient name: Trista Rome  : 1955  MRN: [id number]  Referring provider: Hilda Downey MD  Dx:   Encounter Diagnosis     ICD-10-CM    1  Acute pain of right hip  M25 551        Start Time: 1031  Stop Time: 111  Total time in clinic (min): 41 minutes       Subjective: Patient reports right hip pain is not significant  Patient reports some clear drainage that dries yellow from her incision - reports she's been keeping it covered and has notified the surgeon  Objective: See treatment diary below  Assessment: Progression of therapeutic exercise program is tolerated well  Patient would benefit from continued PT to restore ROM and strength in the right hip  Plan: Continue treatment as per PT plan of care         Precautions: R CHI 22, L CHI      Manuals          prom kl kl  JLW                                                Neuro Re-Ed                                                                                                        Ther Ex             Heel slides             gastroc stretch             Ball squeeze  5"x20 5"x20 5"x30         Supine slr flex    20         saq   5"x20 2#  30         laq  5"x20  2#  30         Hs curls  20 x20 30         marching  Cone taps x20           Mini squats  x20 x20 30         Step up   6"x10  6" x30         Hr/tr  x20ea x20  30 ea         slr abd  standing x20 x20  30         Cone taps  20 x20          bridges   x20  30                      Ther Activity                                       Gait Training                                       Modalities

## 2022-05-02 ENCOUNTER — OFFICE VISIT (OUTPATIENT)
Dept: PHYSICAL THERAPY | Facility: CLINIC | Age: 67
End: 2022-05-02
Payer: COMMERCIAL

## 2022-05-02 DIAGNOSIS — M25.551 ACUTE PAIN OF RIGHT HIP: Primary | ICD-10-CM

## 2022-05-02 PROCEDURE — 97110 THERAPEUTIC EXERCISES: CPT

## 2022-05-02 PROCEDURE — 97530 THERAPEUTIC ACTIVITIES: CPT

## 2022-05-02 PROCEDURE — 97140 MANUAL THERAPY 1/> REGIONS: CPT

## 2022-05-02 NOTE — PROGRESS NOTES
Daily Note     Today's date: 2022  Patient name: Trista Rome  : 1955  MRN: [id number]  Referring provider: Hilda Downey MD  Dx:   Encounter Diagnosis     ICD-10-CM    1  Acute pain of right hip  M25 551        Start Time: 1528  Stop Time: 1610  Total time in clinic (min): 42 minutes       Subjective: Patient reports her incision is still draining clear fluid however physician's office has assured her this is normal   Patient reports she is no longer requiring pain medication  Objective: See treatment diary below  Assessment: Progression of therapeutic exercise program is tolerated well  Patient would benefit from continued PT to restore ROM and strength in the right hip  Plan: Continue treatment as per PT plan of care         Precautions: R CHI 22, L CHI      Manuals  52        prom kl kl  JLW JLW                                               Neuro Re-Ed                                                                                                        Ther Ex             Heel slides             gastroc stretch             clamshell     green  5"x20        Ball squeeze  5"x20 5"x20 5"x30 5"x30        Supine slr flex    20 3x10        saq   5"x20 2#  30 2#  30        laq  5"x20  2#  30 2#  30        Hs curls  20 x20 30 30        marching  Cone taps x20           Mini squats  x20 x20 30 30        Step up   6"x10  6" x30  see below        Hr/tr  x20ea x20  30 ea  30 ea        slr abd  standing x20 x20  30  30        Cone taps  20 x20          bridges   x20  30  30                                  Ther Activity             bike     6'        step up     6" x30                     Gait Training                                       Modalities

## 2022-05-04 ENCOUNTER — OFFICE VISIT (OUTPATIENT)
Dept: PHYSICAL THERAPY | Facility: CLINIC | Age: 67
End: 2022-05-04
Payer: COMMERCIAL

## 2022-05-04 DIAGNOSIS — M25.551 ACUTE PAIN OF RIGHT HIP: Primary | ICD-10-CM

## 2022-05-04 PROCEDURE — 97110 THERAPEUTIC EXERCISES: CPT | Performed by: PHYSICAL THERAPIST

## 2022-05-04 PROCEDURE — 97140 MANUAL THERAPY 1/> REGIONS: CPT | Performed by: PHYSICAL THERAPIST

## 2022-05-04 PROCEDURE — 97530 THERAPEUTIC ACTIVITIES: CPT | Performed by: PHYSICAL THERAPIST

## 2022-05-04 NOTE — PROGRESS NOTES
Daily Note     Today's date: 2022  Patient name: Sukhjinder Rojas  : 1955  MRN: [id number]  Referring provider: Lilia De La Rosa MD  Dx:   Encounter Diagnosis     ICD-10-CM    1  Acute pain of right hip  M25 551                   Subjective: Pt reports tightness in the hip but no pain  Objective: See treatment diary below      Assessment: Pt is progressing very well with strengthening  ROM is still tight in all directions but overall progressing very well for 3 weeks post op  Continue to progress as able  Plan: Continue per plan of care        Precautions: R CHI 22, L CHI      Manuals        prom kl kl  JLW JLW                                               Neuro Re-Ed                                                                                                        Ther Ex             Heel slides             gastroc stretch             clamshell     green  5"x20 30x5''green       Ball squeeze  5"x20 5"x20 5"x30 5"x30 30x5''       Supine slr flex    20 3x10 3x10       saq   5"x20 2#  30 2#  30 --       laq  5"x20  2#  30 2#  30 30x 2#       Hs curls  20 x20 30 30 30x 2#       marching  Cone taps x20           Mini squats  x20 x20 30 30 30x       Step up   6"x10  6" x30  see below        Hr/tr  x20ea x20  30 ea  30 ea 30x ea       standing hip abd  standing x20 x20  30  30 30x 2#       Cone taps  20 x20          bridges   x20  30  30        sls      5x15''                    Ther Activity             bike     6' 6'       step up     6" x30 30x 8''                    Gait Training                                       Modalities

## 2022-05-09 ENCOUNTER — OFFICE VISIT (OUTPATIENT)
Dept: PHYSICAL THERAPY | Facility: CLINIC | Age: 67
End: 2022-05-09
Payer: COMMERCIAL

## 2022-05-09 DIAGNOSIS — M25.551 ACUTE PAIN OF RIGHT HIP: Primary | ICD-10-CM

## 2022-05-09 PROCEDURE — 97140 MANUAL THERAPY 1/> REGIONS: CPT

## 2022-05-09 PROCEDURE — 97530 THERAPEUTIC ACTIVITIES: CPT

## 2022-05-09 PROCEDURE — 97110 THERAPEUTIC EXERCISES: CPT

## 2022-05-09 NOTE — PROGRESS NOTES
Daily Note     Today's date: 2022  Patient name: Yasmany Quiroz  : 1955  MRN: [id number]  Referring provider: Faith Gutiérrez MD  Dx:   Encounter Diagnosis     ICD-10-CM    1  Acute pain of right hip  M25 551        Start Time: 1146  Stop Time: 1228  Total time in clinic (min): 42 minutes       Subjective: Patient denies right hip pain  Due to continued drainage from her incision, patient reports she went to the doctor - reports she was told she had an allergic reaction to the steri strips and was prescribed a medicated cream and Benadryl  Patient reports she is doing much better  Objective: See treatment diary below  Assessment: Treatment is tolerated well  Patient would benefit from continued PT to restore ROM and strength in the right hip  Plan: Continue treatment as per PT plan of care         Precautions: R CHI 22, L CHI      Manuals       prom kl kl  JLW JLW FH JLW                                             Neuro Re-Ed                                                                                                        Ther Ex             Heel slides             clamshell     green  5"x20 30x5''  green green  5"x30      Ball squeeze  5"x20 5"x20 5"x30 5"x30 30x5''       Supine slr flex    20 3x10 3x10 3x10      saq   5"x20 2#  30 2#  30 --       laq  5"x20  2#  30 2#  30 30x 2# 20# x20      Hs curls  20 x20 30 30 30x 2# 20# x20      marching  Cone taps x20           Mini squats  x20 x20 30 30 30x 30      Step up   6"x10  6" x30  see below   8" x30      Hr/tr  x20ea x20  30 ea  30 ea 30x ea  30 ea      standing hip abd  standing x20 x20  30  30 30x 2#  30      Cone taps  20 x20          bridges   x20  30  30   30      sls      5x15''  20"x3                   Ther Activity             bike     6' 6' 10'      step up     6" x30 30x 8'' see above                   Gait Training                                       Modalities

## 2022-05-11 ENCOUNTER — OFFICE VISIT (OUTPATIENT)
Dept: PHYSICAL THERAPY | Facility: CLINIC | Age: 67
End: 2022-05-11
Payer: COMMERCIAL

## 2022-05-11 DIAGNOSIS — M25.551 ACUTE PAIN OF RIGHT HIP: Primary | ICD-10-CM

## 2022-05-11 PROCEDURE — 97110 THERAPEUTIC EXERCISES: CPT

## 2022-05-11 PROCEDURE — 97140 MANUAL THERAPY 1/> REGIONS: CPT

## 2022-05-11 PROCEDURE — 97530 THERAPEUTIC ACTIVITIES: CPT

## 2022-05-11 NOTE — PROGRESS NOTES
Daily Note     Today's date: 2022  Patient name: Mumtaz Kohler  : 1955  MRN: [id number]  Referring provider: Danae Gracia MD  Dx:   Encounter Diagnosis     ICD-10-CM    1  Acute pain of right hip  M25 551        Start Time: 1145  Stop Time: 1224  Total time in clinic (min): 39 minutes      Subjective: Patient reports her right hip is feeling good  Objective: See treatment diary below  Assessment: Progression of therapeutic exercise program is tolerated well  Patient is doing well ambulating with no noticeable gait deviations  Plan: Continue treatment as per PT plan of care         Precautions: R CHI 22, L CHI      Manuals      prom kl kl  JLW JLW FH JLW JLW                                            Neuro Re-Ed                                                                                                        Ther Ex             Heel slides             clamshell     green  5"x20 30x5''  green green  5"x30 green  5"x30     Ball squeeze  5"x20 5"x20 5"x30 5"x30 30x5''       Supine slr flex    20 3x10 3x10 3x10 2#  3x10     saq   5"x20 2#  30 2#  30 --       laq  5"x20  2#  30 2#  30 30x 2# 20# x20 20#  30     Hs curls  20 x20 30 30 30x 2# 20# x20 30#  30     marching  Cone taps x20           Mini squats  x20 x20 30 30 30x 30 30     Step up   6"x10  6" x30  see below   8" x30  8" x30     Hr/tr  x20ea x20  30 ea  30 ea 30x ea  30 ea  30 ea     standing hip abd  standing x20 x20  30  30 30x 2#  30  2 5#   30     Cone taps  20 x20          bridges   x20  30  30   30  30     sls      5x15''  20"x3                   Ther Activity             bike     6' 6' 10' 8'     step up     6" x30 30x 8'' see above                   Gait Training                                       Modalities

## 2022-05-16 ENCOUNTER — OFFICE VISIT (OUTPATIENT)
Dept: PHYSICAL THERAPY | Facility: CLINIC | Age: 67
End: 2022-05-16
Payer: COMMERCIAL

## 2022-05-16 DIAGNOSIS — M25.551 ACUTE PAIN OF RIGHT HIP: Primary | ICD-10-CM

## 2022-05-16 DIAGNOSIS — M25.531 RIGHT WRIST PAIN: ICD-10-CM

## 2022-05-16 PROCEDURE — 97110 THERAPEUTIC EXERCISES: CPT

## 2022-05-16 PROCEDURE — 97530 THERAPEUTIC ACTIVITIES: CPT

## 2022-05-16 PROCEDURE — 97140 MANUAL THERAPY 1/> REGIONS: CPT

## 2022-05-16 NOTE — PROGRESS NOTES
Daily Note     Today's date: 2022  Patient name: Rhina Wen  : 1955  MRN: [id number]  Referring provider: Maryellen Quiñonez MD  Dx:   Encounter Diagnosis     ICD-10-CM    1  Acute pain of right hip  M25 551    2  Right wrist pain  M25 531                   Subjective: Pt reports no significant complaints, just feeling some tightness  Pt without pain arriving to PT  Objective: See treatment diary below      Assessment: Pt tolerates treatment well with fatigue noted post  Pt is responding well to POC thus far  Pt will benefit from continued skilled PT to maximize function  Plan: Continue per plan of care        Precautions: R CHI 22, L CHI      Manuals     prom kl kl  JLW JLW FH JLW JLW DL                                           Neuro Re-Ed                                                                                                        Ther Ex             Heel slides             clamshell     green  5"x20 30x5''  green green  5"x30 green  5"x30 green  5"x30    Ball squeeze  5"x20 5"x20 5"x30 5"x30 30x5''       Supine slr flex    20 3x10 3x10 3x10 2#  3x10 2#  3x10    saq   5"x20 2#  30 2#  30 --       laq  5"x20  2#  30 2#  30 30x 2# 20# x20 20#  30 20#  30    Hs curls  20 x20 30 30 30x 2# 20# x20 30#  30 30#  30    marching  Cone taps x20           Mini squats  x20 x20 30 30 30x 30 30 30    Step up   6"x10  6" x30  see below   8" x30  8" x30 8" x30    Hr/tr  x20ea x20  30 ea  30 ea 30x ea  30 ea  30 ea 30 ea    standing hip abd  standing x20 x20  30  30 30x 2#  30  2 5#   30 2 5# 30    Cone taps  20 x20          bridges   x20  30  30   30  30 30    sls      5x15''  20"x3                   Ther Activity             bike     6' 6' 10' 8' 8'    step up     6" x30 30x 8'' see above                   Gait Training                                       Modalities

## 2022-05-19 ENCOUNTER — OFFICE VISIT (OUTPATIENT)
Dept: PHYSICAL THERAPY | Facility: CLINIC | Age: 67
End: 2022-05-19
Payer: COMMERCIAL

## 2022-05-19 DIAGNOSIS — M25.551 ACUTE PAIN OF RIGHT HIP: Primary | ICD-10-CM

## 2022-05-19 PROCEDURE — 97110 THERAPEUTIC EXERCISES: CPT

## 2022-05-19 PROCEDURE — 97530 THERAPEUTIC ACTIVITIES: CPT

## 2022-05-19 PROCEDURE — 97140 MANUAL THERAPY 1/> REGIONS: CPT

## 2022-05-19 NOTE — PROGRESS NOTES
Daily Note     Today's date: 2022  Patient name: Darvin Martins  : 1955  MRN: [id number]  Referring provider: Nicky Allen MD  Dx:   Encounter Diagnosis     ICD-10-CM    1  Acute pain of right hip  M25 551        Start Time: 1145  Stop Time: 1229  Total time in clinic (min): 44 minutes       Subjective: Patient denies any significant right hip pain  Patient reports she is no longer taking Celebrex - reports she is taking a low dose aspirin only  Objective: See treatment diary below  Assessment: Patient is recovering extremely well from right hip replacement surgery  Progression of therapeutic exercise program is tolerated well  Encouraged patient to perform gentle lower extremity stretching prior to getting out of bed to alleviate stiffness in the mornings  Plan: Continue treatment as per PT plan of care         Precautions: R CHI 22, L CHI      Manuals    prom kl kl  JLW JLW FH JLW JLW DL JLW                                          Neuro Re-Ed                                                                                                        Ther Ex             Heel slides             clamshell     green  5"x20 30x5''  green green  5"x30 green  5"x30 green  5"x30 blue  5"x30   Ball squeeze  5"x20 5"x20 5"x30 5"x30 30x5''       Supine slr flex    20 3x10 3x10 3x10 2#  3x10 2#  3x10 2#  30   saq   5"x20 2#  30 2#  30 --       laq  5"x20  2#  30 2#  30 30x 2# 20# x20 20#  30 20#  30 20#  30   Hs curls  20 x20 30 30 30x 2# 20# x20 30#  30 30#  30 30#  30   marching  Cone taps x20           Mini squats  x20 x20 30 30 30x 30 30 30 30   Step up   6"x10  6" x30  see below   8" x30  8" x30 8" x30  10" x30   Lateral step up           10" x30   Hr/tr  x20ea x20  30 ea  30 ea 30x ea  30 ea  30 ea 30 ea    standing hip abd  standing x20 x20  30  30 30x 2#  30  2 5#   30 2 5# 30  3#   30   Cone taps  20 x20          bridges   x20  30  30 30  30 30    sls      5x15''  20"x3                   Ther Activity             bike     6' 6' 10' 8' 8' 13'   step up     6" x30 30x 8'' see above                   Gait Training                                       Modalities

## 2022-05-23 ENCOUNTER — OFFICE VISIT (OUTPATIENT)
Dept: PHYSICAL THERAPY | Facility: CLINIC | Age: 67
End: 2022-05-23
Payer: COMMERCIAL

## 2022-05-23 DIAGNOSIS — M25.551 ACUTE PAIN OF RIGHT HIP: Primary | ICD-10-CM

## 2022-05-23 PROCEDURE — 97530 THERAPEUTIC ACTIVITIES: CPT

## 2022-05-23 PROCEDURE — 97140 MANUAL THERAPY 1/> REGIONS: CPT

## 2022-05-23 PROCEDURE — 97110 THERAPEUTIC EXERCISES: CPT

## 2022-05-23 NOTE — PROGRESS NOTES
Daily Note     Today's date: 2022  Patient name: Malcom Moore  : 1955  MRN: [id number]  Referring provider: Chen Soria MD  Dx:   Encounter Diagnosis     ICD-10-CM    1  Acute pain of right hip  M25 551        Start Time: 1145  Stop Time: 1225  Total time in clinic (min): 40 minutes      Subjective: Patient reports her right knee feels like it sometimes is hyperextending when she first starts walking  Patient [place]  Objective: See treatment diary below  Assessment: Therapeutic exercise program and right hip PROM is tolerated well  Patient would benefit from continued PT to improve right hip strength and stability  Plan: Continue treatment as per PT plan of care         Precautions: R CHI 22, L CHI      Manuals     prom JLW   JLW JLW FH JLW JLW DL JLW                                          Neuro Re-Ed                                                                                                        Ther Ex             Heel slides             clamshell blue  5"x30    green  5"x20 30x5''  green green  5"x30 green  5"x30 green  5"x30 blue  5"x30   Ball squeeze   5"x20 5"x30 5"x30 30x5''       Supine slr flex 2 5#  3x10   20 3x10 3x10 3x10 2#  3x10 2#  3x10 2#  30   saq   5"x20 2#  30 2#  30 --       laq 20#  30   2#  30 2#  30 30x 2# 20# x20 20#  30 20#  30 20#  30   Hs curls 30#  30  x20 30 30 30x 2# 20# x20 30#  30 30#  30 30#  30   marching             Mini squats 30  x20 30 30 30x 30 30 30 30   Step up 10"  30  6"x10  6" x30  see below   8" x30  8" x30 8" x30  10" x30   Lateral step up 10"  30          10" x30   Hr/tr   x20  30 ea  30 ea 30x ea  30 ea  30 ea 30 ea    standing hip abd side  lying  10  x20  30  30 30x 2#  30  2 5#   30 2 5# 30  3#   30   Cone taps   x20          bridge with ball squeeze 30  x20  30  30   30  30 30    sls      5x15''  20"x3                   Ther Activity             bike  5'    6' 6' 10' 8' 8' 13'   step up     6" x30 30x 8'' see above                   Gait Training                                       Modalities

## 2022-05-26 ENCOUNTER — OFFICE VISIT (OUTPATIENT)
Dept: PHYSICAL THERAPY | Facility: CLINIC | Age: 67
End: 2022-05-26
Payer: COMMERCIAL

## 2022-05-26 DIAGNOSIS — M25.551 ACUTE PAIN OF RIGHT HIP: Primary | ICD-10-CM

## 2022-05-26 PROCEDURE — 97110 THERAPEUTIC EXERCISES: CPT | Performed by: PHYSICAL THERAPIST

## 2022-05-26 PROCEDURE — 97530 THERAPEUTIC ACTIVITIES: CPT | Performed by: PHYSICAL THERAPIST

## 2022-05-26 NOTE — PROGRESS NOTES
Daily Note     Today's date: 2022  Patient name: Alan Kamara  : 1955  MRN: [id number]  Referring provider: Carmen Contreras MD  Dx:   Encounter Diagnosis     ICD-10-CM    1  Acute pain of right hip  M25 551                   Subjective: Pt reports some soreness  Objective: See treatment diary below      Assessment: Pt tolerating program very well  Pt having some difficulty with sidelying hip abd due to lying of other CHI, added hip hikes that was tolerated well  Pt would continue to benefit from PT services for advanced progressions  Plan: Continue per plan of care        Precautions: R CHI 22, L CHI      Manuals     prom JLW   JLW JLW FH JLW JLW DL JLW                                          Neuro Re-Ed                                                                                                        Ther Ex             Heel slides             clamshell blue  5"x30 30x5'' blue   green  5"x20 30x5''  green green  5"x30 green  5"x30 green  5"x30 blue  5"x30   Ball squeeze   5"x20 5"x30 5"x30 30x5''       Supine slr flex 2 5#  3x10 2x5# 30x  20 3x10 3x10 3x10 2#  3x10 2#  3x10 2#  30   saq   5"x20 2#  30 2#  30 --       laq 20#  30 20# 30x  2#  30 2#  30 30x 2# 20# x20 20#  30 20#  30 20#  30   Hs curls 30#  30 30# 30x x20 30 30 30x 2# 20# x20 30#  30 30#  30 30#  30   marching             Mini squats 30 30x x20 30 30 30x 30 30 30 30   Step up 10"  30 30x 10'' 6"x10  6" x30  see below   8" x30  8" x30 8" x30  10" x30   Lateral step up 10"  30 30x10''         10" x30   Hr/tr   x20  30 ea  30 ea 30x ea  30 ea  30 ea 30 ea    standing hip abd side  lying  10 sidelying 10x x20  30  30 30x 2#  30  2 5#   30 2 5# 30  3#   30   Hip Hike  10x           Cone taps   x20          bridge with ball squeeze 30 30x x20  30  30   30  30 30    sls      5x15''  20"x3                   Ther Activity             bike  9' 8' L2   6' 6' 10' 8' 8' 13'   step up     6" x30 30x 8'' see above                   Gait Training                                       Modalities

## 2022-06-01 ENCOUNTER — OFFICE VISIT (OUTPATIENT)
Dept: PHYSICAL THERAPY | Facility: CLINIC | Age: 67
End: 2022-06-01
Payer: COMMERCIAL

## 2022-06-01 DIAGNOSIS — M25.551 ACUTE PAIN OF RIGHT HIP: Primary | ICD-10-CM

## 2022-06-01 PROCEDURE — 97110 THERAPEUTIC EXERCISES: CPT

## 2022-06-01 PROCEDURE — 97530 THERAPEUTIC ACTIVITIES: CPT

## 2022-06-01 NOTE — PROGRESS NOTES
Daily Note     Today's date: 2022  Patient name: Usha Barnett  : 1955  MRN: [id number]  Referring provider: Blanca Bose MD  Dx:   Encounter Diagnosis     ICD-10-CM    1  Acute pain of right hip  M25 551        Start Time: 1406  Stop Time: 1445  Total time in clinic (min): 39 minutes      Subjective: Patient reports her right hip is feeling good  Patient reports she was finally able to sleep in her master bedroom last night which is on the second floor of her home  Objective: See treatment diary below  Assessment: Treatment is tolerated well  No loss of balance noted during right SLS x 30 seconds  Plan: Continue treatment as per PT plan of care         Precautions: R [facility] 22, L CHI      Manuals     prom JLW    JLW FH JLW JLW DL JLW                                          Neuro Re-Ed                                                                                                        Ther Ex             Heel slides             clamshell blue  5"x30 30x5'' blue blue  5"x30  green  5"x20 30x5''  green green  5"x30 green  5"x30 green  5"x30 blue  5"x30   Ball squeeze     5"x30 30x5''       Supine slr flex 2 5#  3x10 2 5# 30x 2 5#  30  3x10 3x10 3x10 2#  3x10 2#  3x10 2#  30   saq     2#  30 --       laq 20#  30 20# 30x 20#  30  2#  30 30x 2# 20# x20 20#  30 20#  30 20#  30   Hs curls 30#  30 30# 30x 30#  30  30 30x 2# 20# x20 30#  30 30#  30 30#  30   marching             Mini squats 30 30x leg press  20#  3x10  30 30x 30 30 30 30   Step up 10"  30 30x 10''  10"   30   see below   8" x30  8" x30 8" x30  10" x30   Lateral step up 10"  30 30x10''  10"   30        10" x30   Hr/tr      30 ea 30x ea  30 ea  30 ea 30 ea    standing hip abd side  lying  10 sidelying 10x  sidelying  15   30 30x 2#  30  2 5#   30 2 5# 30  3#   30   Hip Hike  10x           Cone taps             bridge with ball squeeze 30 30x  5"x30   30   30  30 30    sls    30" 5x15''  20"x3      lunge    10                       Ther Activity             bike  9' 8' L2 10' L2  6' 6' 10' 8' 8' 13'   step up     6" x30 30x 8'' see above                   Gait Training                                       Modalities

## 2022-06-06 ENCOUNTER — OFFICE VISIT (OUTPATIENT)
Dept: PHYSICAL THERAPY | Facility: CLINIC | Age: 67
End: 2022-06-06
Payer: COMMERCIAL

## 2022-06-06 DIAGNOSIS — M25.551 ACUTE PAIN OF RIGHT HIP: Primary | ICD-10-CM

## 2022-06-06 PROCEDURE — 97110 THERAPEUTIC EXERCISES: CPT

## 2022-06-06 PROCEDURE — 97530 THERAPEUTIC ACTIVITIES: CPT

## 2022-06-06 NOTE — PROGRESS NOTES
Daily Note     Today's date: 2022  Patient name: Romelia Sorto  : 1955  MRN: [id number]  Referring provider: Ty Howe MD  Dx:   Encounter Diagnosis     ICD-10-CM    1  Acute pain of right hip  M25 551        Start Time: 1442  Stop Time: 1521  Total time in clinic (min): 39 minutes       Subjective: Patient reports her right hip continues to feel good  Objective: See treatment diary below  Assessment: Progression of therapeutic exercise program is tolerated well  Patient's right hip strength is improving  Plan: Continue treatment as per PT plan of care         Precautions: R CHI 22, L CHI      Manuals     prom JLW     FH JLW JLW DL JLW                                          Neuro Re-Ed                                                                                                        Ther Ex             Heel slides             clamshell blue  5"x30 30x5'' blue blue  5"x30 black  5"x30  30x5''  green green  5"x30 green  5"x30 green  5"x30 blue  5"x30   Ball squeeze      30x5''       Supine slr flex 2 5#  3x10 2 5# 30x 2 5#  30 2 5#  30  3x10 3x10 2#  3x10 2#  3x10 2#  30   saq      --       laq 20#  30 20# 30x 20#  30 20#  30  30x 2# 20# x20 20#  30 20#  30 20#  30   Hs curls 30#  30 30# 30x 30#  30 30#  30  30x 2# 20# x20 30#  30 30#  30 30#  30   marching             Mini squats 30 30x leg press  20#  3x10 leg press  30#  3x10  30x 30 30 30 30   Step up 10"  30 30x 10''  10"   30  10"   30    8" x30  8" x30 8" x30  10" x30   Lateral step up 10"  30 30x10''  10"   30  10"   30       10" x30   Hr/tr      30x ea  30 ea  30 ea 30 ea    standing hip abd side  lying  10 sidelying 10x  sidelying  15 side  lying  30  30x 2#  30  2 5#   30 2 5# 30  3#   30   Hip Hike  10x           Cone taps             bridge with ball squeeze 30 30x  5"x30 5"x30    30  30 30    sls    30"  30"  5x15''  20"x3      lunge    10  15 Ther Activity             bike  5' 8' L2 10' L2 10'  6' 10' 8' 8' 13'   step up      30x 8'' see above                   Gait Training                                       Modalities

## 2022-06-08 ENCOUNTER — OFFICE VISIT (OUTPATIENT)
Dept: PHYSICAL THERAPY | Facility: CLINIC | Age: 67
End: 2022-06-08
Payer: COMMERCIAL

## 2022-06-08 DIAGNOSIS — M25.551 ACUTE PAIN OF RIGHT HIP: Primary | ICD-10-CM

## 2022-06-08 PROCEDURE — 97530 THERAPEUTIC ACTIVITIES: CPT

## 2022-06-08 PROCEDURE — 97110 THERAPEUTIC EXERCISES: CPT

## 2022-06-08 NOTE — PROGRESS NOTES
Daily Note     Today's date: 2022  Patient name: Sejal Moraes  : 1955  MRN: [id number]  Referring provider: Terri Stone MD  Dx:   Encounter Diagnosis     ICD-10-CM    1  Acute pain of right hip  M25 551        Start Time: 1100  Stop Time: 1145  Total time in clinic (min): 45 minutes      Subjective: Patient reports she pulled weeds for 2 hours - felt soreness in B/L hamstrings afterwards - no other complaints to report  Patient reports she is driving to [place] later today and staying overnight  Objective: See treatment diary below  Assessment: Therapeutic exercise program is tolerated well without complaints  Patient would benefit from one more week of PT to continue strengthening the right hip  Plan: Continue treatment as per PT plan of care         Precautions: R CHI 22, L CHI      Manuals     prom JLW      JLW JLW DL JLW                                          Neuro Re-Ed                                                                                                        Ther Ex             Heel slides             clamshell blue  5"x30 30x5'' blue blue  5"x30 black  5"x30 black  5"x30  green  5"x30 green  5"x30 green  5"x30 blue  5"x30   Ball squeeze             supine slr flex 2 5#  3x10 2 5# 30x 2 5#  30 2 5#  30 2 5#  30  3x10 2#  3x10 2#  3x10 2#  30   saq             laq 20#  30 20# 30x 20#  30 20#  30 20#  20  20# x20 20#  30 20#  30 20#  30   Hs curls 30#  30 30# 30x 30#  30 30#  30 30#  30  20# x20 30#  30 30#  30 30#  30   marching             Mini squats 30 30x leg press  20#  3x10 leg press  30#  3x10 leg press  30#  3x10  30 30 30 30   Step up 10"  30 30x 10''  10"   30  10"   30  10"   30   8" x30  8" x30 8" x30  10" x30   Lateral step up 10"  30 30x10''  10"   30  10"   30  10"   30      10" x30   Hr/tr        30 ea  30 ea 30 ea    standing hip abd side  lying  10 sidelying 10x  sidelying  15 side  lying  30 side  lying  30   30  2 5#   30 2 5# 30  3#   30   Hip Hike  10x           Cone taps             bridge with ball squeeze 30 30x  5"x30 5"x30  5"x30   30  30 30    sls    30"  30"  green foam   30"x2   20"x3      lunge    10  15  15                     Ther Activity             bike  9' 8' L2 10' L2 10' 10'  10' 8' 8' 13'   step up       see above                   Gait Training                                       Modalities

## 2022-06-09 ENCOUNTER — APPOINTMENT (OUTPATIENT)
Dept: PHYSICAL THERAPY | Facility: CLINIC | Age: 67
End: 2022-06-09
Payer: COMMERCIAL

## 2022-06-13 ENCOUNTER — OFFICE VISIT (OUTPATIENT)
Dept: PHYSICAL THERAPY | Facility: CLINIC | Age: 67
End: 2022-06-13
Payer: COMMERCIAL

## 2022-06-13 DIAGNOSIS — M25.551 ACUTE PAIN OF RIGHT HIP: Primary | ICD-10-CM

## 2022-06-13 PROCEDURE — 97110 THERAPEUTIC EXERCISES: CPT

## 2022-06-13 PROCEDURE — 97530 THERAPEUTIC ACTIVITIES: CPT

## 2022-06-13 NOTE — PROGRESS NOTES
Daily Note     Today's date: 2022  Patient name: Yasmany Quiroz  : 1955  MRN: [id number]  Referring provider: Faith Gutiérrez MD  Dx:   Encounter Diagnosis     ICD-10-CM    1  Acute pain of right hip  M25 551        Start Time: 930  Stop Time: 1009  Total time in clinic (min): 39 minutes      Subjective: Patient reports she moved dirt yesterday and helped her  fill the garden boxes  Patient states, "I'm surprised I wasn't sore "      Objective: See treatment diary below  Assessment: Patient is doing well with therapeutic exercise program   Pending continued progress, plan to discharge patient next session  Plan: Continue treatment as per PT plan of care         Precautions: R CHI 22, L CHI      Manuals     prom JLW       JLW DL JLW                                          Neuro Re-Ed                                                                                                        Ther Ex             Heel slides             clamshell blue  5"x30 30x5'' blue blue  5"x30 black  5"x30 black  5"x30 black  5"x30  green  5"x30 green  5"x30 blue  5"x30   Ball squeeze             supine slr flex 2 5#  3x10 2 5# 30x 2 5#  30 2 5#  30 2 5#  30 2 5#  30  2#  3x10 2#  3x10 2#  30   saq             laq 20#  30 20# 30x 20#  30 20#  30 20#  20 20#  30  20#  30 20#  30 20#  30   Hs curls 30#  30 30# 30x 30#  30 30#  30 30#  30 40#  30  30#  30 30#  30 30#  30   marching             Mini squats 30 30x leg press  20#  3x10 leg press  30#  3x10 leg press  30#  3x10 leg press  30#  3x10  30 30 30   Step up 10"  30 30x 10''  10"   30  10"   30  10"   30  10"   30   8" x30 8" x30  10" x30   Lateral step up 10"  30 30x10''  10"   30  10"   30  10"   30  10"   30     10" x30   Hr/tr         30 ea 30 ea    standing hip abd side  lying  10 sidelying 10x  sidelying  15 side  lying  30 side  lying  30 side  lying  30   2 5#   30 2 5# 30  3#   30   Hip Hike 10x           Cone taps             bridge with ball squeeze 30 30x  5"x30 5"x30  5"x30  5"x30   30 30    sls    30"  30"  green foam   30"x2 green foam   30"x2       lunges    10  15  15  20                    Ther Activity             bike  9' 8' L2 10' L2 10' 10' 10'  8' 8' 13'                             Gait Training                                       Modalities

## 2022-06-16 ENCOUNTER — OFFICE VISIT (OUTPATIENT)
Dept: PHYSICAL THERAPY | Facility: CLINIC | Age: 67
End: 2022-06-16
Payer: COMMERCIAL

## 2022-06-16 DIAGNOSIS — M25.551 ACUTE PAIN OF RIGHT HIP: Primary | ICD-10-CM

## 2022-06-16 PROCEDURE — 97530 THERAPEUTIC ACTIVITIES: CPT

## 2022-06-16 PROCEDURE — 97110 THERAPEUTIC EXERCISES: CPT

## 2022-06-16 NOTE — PROGRESS NOTES
Daily Note/DC Summary     Today's date: 2022  Patient name: Mike Fernandez  : 1955  MRN: [id number]  Referring provider: Basil Galloway MD  Dx:   Encounter Diagnosis     ICD-10-CM    1  Acute pain of right hip  M25 551        Start Time: 1013  Stop Time: 1052  Total time in clinic (min): 39 minutes       Subjective: Patient denies right hip pain and denies difficulty with her daily activities  Objective: See treatment diary below  Assessment: Patient is doing well with therapeutic exercise program   Right hip ROM is WFLs  Patient is ready to transition to independent HEP  Plan: Discharge from outpatient PT         Precautions: R CHI 22, L CHI      Manuals     prom JLW        DL JLW                                          Neuro Re-Ed                                                                                                        Ther Ex             Heel slides             clamshell blue  5"x30 30x5'' blue blue  5"x30 black  5"x30 black  5"x30 black  5"x30 black  5"x30  green  5"x30 blue  5"x30   Ball squeeze             supine slr flex 2 5#  3x10 2 5# 30x 2 5#  30 2 5#  30 2 5#  30 2 5#  30 2 5#  30  2#  3x10 2#  30   saq             laq 20#  30 20# 30x 20#  30 20#  30 20#  20 20#  30 20#  30  20#  30 20#  30   Hs curls 30#  30 30# 30x 30#  30 30#  30 30#  30 40#  30 40#  30  30#  30 30#  30   marching             Mini squats 30 30x leg press  20#  3x10 leg press  30#  3x10 leg press  30#  3x10 leg press  30#  3x10 leg press  30#  3x10  30 30   Step up 10"  30 30x 10''  10"   30  10"   30  10"   30  10"   30  10"   30  8" x30  10" x30   Lateral step up 10"  30 30x10''  10"   30  10"   30  10"   30  10"   30  10"   30    10" x30   Hr/tr         30 ea    standing hip abd side  lying  10 sidelying 10x  sidelying  15 side  lying  30 side  lying  30 side  lying  30 side  lying  30  2 5# 30  3#   30   Hip Hike  10x           Cone taps bridge with ball squeeze 30 30x  5"x30 5"x30  5"x30  5"x30  5"x30  30    sls    30"  30"  green foam   30"x2 green foam   30"x2  blue   foam   30"x2      lunges    10  15  15  20  20                   Ther Activity             bike  9' 8' L2 10' L2 10' 10' 10'  10'    lvl 5  8' 13'                             Gait Training                                       Modalities

## 2022-06-23 ENCOUNTER — HOSPITAL ENCOUNTER (OUTPATIENT)
Dept: RADIOLOGY | Age: 67
Discharge: HOME | End: 2022-06-23
Payer: COMMERCIAL

## 2022-06-23 DIAGNOSIS — M85.851 OSTEOPENIA OF BOTH HIPS: ICD-10-CM

## 2022-06-23 DIAGNOSIS — M85.852 OSTEOPENIA OF BOTH HIPS: ICD-10-CM

## 2022-06-23 PROCEDURE — 77080 DXA BONE DENSITY AXIAL: CPT

## 2022-08-03 ENCOUNTER — OFFICE VISIT (OUTPATIENT)
Dept: FAMILY MEDICINE CLINIC | Facility: CLINIC | Age: 67
End: 2022-08-03
Payer: COMMERCIAL

## 2022-08-03 VITALS
SYSTOLIC BLOOD PRESSURE: 130 MMHG | TEMPERATURE: 99.2 F | BODY MASS INDEX: 30.73 KG/M2 | RESPIRATION RATE: 16 BRPM | HEIGHT: 62 IN | DIASTOLIC BLOOD PRESSURE: 82 MMHG | WEIGHT: 167 LBS | OXYGEN SATURATION: 98 % | HEART RATE: 98 BPM

## 2022-08-03 DIAGNOSIS — N30.01 ACUTE CYSTITIS WITH HEMATURIA: ICD-10-CM

## 2022-08-03 DIAGNOSIS — R31.0 GROSS HEMATURIA: ICD-10-CM

## 2022-08-03 DIAGNOSIS — E78.00 PURE HYPERCHOLESTEROLEMIA: ICD-10-CM

## 2022-08-03 DIAGNOSIS — I10 PRIMARY HYPERTENSION: ICD-10-CM

## 2022-08-03 DIAGNOSIS — R30.0 DYSURIA: Primary | ICD-10-CM

## 2022-08-03 PROBLEM — N18.31 STAGE 3A CHRONIC KIDNEY DISEASE (HCC): Status: RESOLVED | Noted: 2022-08-03 | Resolved: 2022-08-03

## 2022-08-03 PROBLEM — M54.42 CHRONIC LEFT-SIDED LOW BACK PAIN WITH LEFT-SIDED SCIATICA: Status: RESOLVED | Noted: 2017-05-23 | Resolved: 2022-08-03

## 2022-08-03 PROBLEM — G89.29 CHRONIC RIGHT HIP PAIN: Status: RESOLVED | Noted: 2022-02-09 | Resolved: 2022-08-03

## 2022-08-03 PROBLEM — G89.29 CHRONIC LEFT-SIDED LOW BACK PAIN WITH LEFT-SIDED SCIATICA: Status: RESOLVED | Noted: 2017-05-23 | Resolved: 2022-08-03

## 2022-08-03 PROBLEM — M25.551 CHRONIC RIGHT HIP PAIN: Status: RESOLVED | Noted: 2022-02-09 | Resolved: 2022-08-03

## 2022-08-03 PROBLEM — M16.11 PRIMARY OSTEOARTHRITIS OF RIGHT HIP: Status: RESOLVED | Noted: 2019-05-24 | Resolved: 2022-08-03

## 2022-08-03 PROBLEM — N18.31 STAGE 3A CHRONIC KIDNEY DISEASE (HCC): Status: ACTIVE | Noted: 2022-08-03

## 2022-08-03 LAB
SL AMB  POCT GLUCOSE, UA: ABNORMAL
SL AMB LEUKOCYTE ESTERASE,UA: ABNORMAL
SL AMB POCT BILIRUBIN,UA: ABNORMAL
SL AMB POCT BLOOD,UA: ABNORMAL
SL AMB POCT CLARITY,UA: ABNORMAL
SL AMB POCT COLOR,UA: ABNORMAL
SL AMB POCT KETONES,UA: ABNORMAL
SL AMB POCT NITRITE,UA: ABNORMAL
SL AMB POCT PH,UA: 5
SL AMB POCT SPECIFIC GRAVITY,UA: 1.02
SL AMB POCT URINE PROTEIN: ABNORMAL
SL AMB POCT UROBILINOGEN: ABNORMAL

## 2022-08-03 PROCEDURE — 87186 SC STD MICRODIL/AGAR DIL: CPT | Performed by: FAMILY MEDICINE

## 2022-08-03 PROCEDURE — 99214 OFFICE O/P EST MOD 30 MIN: CPT | Performed by: FAMILY MEDICINE

## 2022-08-03 PROCEDURE — 87077 CULTURE AEROBIC IDENTIFY: CPT | Performed by: FAMILY MEDICINE

## 2022-08-03 PROCEDURE — 87086 URINE CULTURE/COLONY COUNT: CPT | Performed by: FAMILY MEDICINE

## 2022-08-03 PROCEDURE — 81002 URINALYSIS NONAUTO W/O SCOPE: CPT | Performed by: FAMILY MEDICINE

## 2022-08-03 RX ORDER — CIPROFLOXACIN 500 MG/1
500 TABLET, FILM COATED ORAL EVERY 12 HOURS SCHEDULED
Qty: 14 TABLET | Refills: 0 | Status: SHIPPED | OUTPATIENT
Start: 2022-08-03 | End: 2022-08-10

## 2022-08-03 NOTE — PROGRESS NOTES
Assessment/Plan:    Pure hypercholesterolemia  Diet controlled   Due for labs    Hypertension  Stable on current meds       Diagnoses and all orders for this visit:    Dysuria  -     POCT urine dip  -     Urine culture    Gross hematuria  Comments:  to get ultrasound done if she is not better on antibiotics to r/o kidney stones  Orders:  -     US kidney and bladder; Future    Acute cystitis with hematuria  -     ciprofloxacin (CIPRO) 500 mg tablet; Take 1 tablet (500 mg total) by mouth every 12 (twelve) hours for 7 days    Primary hypertension  -     CBC and differential  -     Comprehensive metabolic panel    Pure hypercholesterolemia  -     Lipid Panel with Direct LDL reflex; Future    Other orders  -     Cholecalciferol (VITAMIN D3 PO); Take 5,000 Units by mouth        Subjective:      Patient ID: Shanta Mazariegos is a 61 y o  female  Started last night with burning and urinary frequency along with hematuria  Some discomfort in lower abdomen       Blood in Urine  This is a new problem  The current episode started yesterday  She describes the hematuria as gross hematuria  The hematuria occurs throughout her entire urinary stream  The pain is mild  Irritative symptoms include frequency, nocturia and urgency  Obstructive symptoms do not include dribbling, incomplete emptying, an intermittent stream, a slower stream, straining or a weak stream  Associated symptoms include abdominal pain, bladder pain and dysuria  Pertinent negatives include no bone pain, chills, facial swelling, fever, flank pain, genital pain, hematospermia, hesitancy, inability to urinate, nausea, urinary retention, vomiting or weight loss  She is sexually active  There is no history of BPH,  trauma, hypertension, kidney stones, prostatitis, recent infection, sickle cell disease, STDs or tobacco use           The following portions of the patient's history were reviewed and updated as appropriate: allergies, current medications, past family history, past medical history, past social history, past surgical history and problem list     Review of Systems   Constitutional: Negative for chills, fever and weight loss  HENT: Negative for facial swelling  Gastrointestinal: Positive for abdominal pain  Negative for nausea and vomiting  Genitourinary: Positive for dysuria, frequency, hematuria, nocturia and urgency  Negative for flank pain, hesitancy and incomplete emptying  Objective:      /82 (BP Location: Left arm, Patient Position: Sitting, Cuff Size: Adult)   Pulse 98   Temp 99 2 °F (37 3 °C) (Tympanic)   Resp 16   Ht 5' 1 77" (1 569 m)   Wt 75 8 kg (167 lb)   SpO2 98%   BMI 30 77 kg/m²          Physical Exam  Vitals reviewed  Constitutional:       Appearance: Normal appearance  HENT:      Right Ear: Tympanic membrane normal       Left Ear: Tympanic membrane normal       Mouth/Throat:      Mouth: Mucous membranes are moist    Cardiovascular:      Rate and Rhythm: Normal rate and regular rhythm  Pulses: Normal pulses  Heart sounds: Normal heart sounds  Pulmonary:      Effort: Pulmonary effort is normal       Breath sounds: Normal breath sounds  Neurological:      General: No focal deficit present  Mental Status: She is alert and oriented to person, place, and time     Psychiatric:         Mood and Affect: Mood normal          Behavior: Behavior normal

## 2022-08-05 LAB — BACTERIA UR CULT: ABNORMAL

## 2022-08-29 ENCOUNTER — HOSPITAL ENCOUNTER (OUTPATIENT)
Dept: RADIOLOGY | Age: 67
Discharge: HOME | End: 2022-08-29
Payer: MEDICARE

## 2022-08-29 VITALS — HEIGHT: 62 IN | WEIGHT: 167 LBS | BODY MASS INDEX: 30.73 KG/M2

## 2022-08-29 DIAGNOSIS — Z12.31 VISIT FOR SCREENING MAMMOGRAM: ICD-10-CM

## 2022-08-29 PROCEDURE — 77063 BREAST TOMOSYNTHESIS BI: CPT

## 2022-08-29 PROCEDURE — 77067 SCR MAMMO BI INCL CAD: CPT

## 2022-11-03 ENCOUNTER — APPOINTMENT (OUTPATIENT)
Dept: LAB | Age: 67
End: 2022-11-03

## 2022-11-03 DIAGNOSIS — E78.00 PURE HYPERCHOLESTEROLEMIA: ICD-10-CM

## 2022-11-03 LAB
ALBUMIN SERPL BCP-MCNC: 4 G/DL (ref 3.5–5)
ALP SERPL-CCNC: 144 U/L (ref 46–116)
ALT SERPL W P-5'-P-CCNC: 61 U/L (ref 12–78)
ANION GAP SERPL CALCULATED.3IONS-SCNC: 2 MMOL/L (ref 4–13)
AST SERPL W P-5'-P-CCNC: 33 U/L (ref 5–45)
BASOPHILS # BLD AUTO: 0.04 THOUSANDS/ÂΜL (ref 0–0.1)
BASOPHILS NFR BLD AUTO: 1 % (ref 0–1)
BILIRUB SERPL-MCNC: 0.96 MG/DL (ref 0.2–1)
BUN SERPL-MCNC: 14 MG/DL (ref 5–25)
CALCIUM SERPL-MCNC: 10 MG/DL (ref 8.3–10.1)
CHLORIDE SERPL-SCNC: 105 MMOL/L (ref 96–108)
CHOLEST SERPL-MCNC: 215 MG/DL
CO2 SERPL-SCNC: 30 MMOL/L (ref 21–32)
CREAT SERPL-MCNC: 1.05 MG/DL (ref 0.6–1.3)
EOSINOPHIL # BLD AUTO: 0.18 THOUSAND/ÂΜL (ref 0–0.61)
EOSINOPHIL NFR BLD AUTO: 3 % (ref 0–6)
ERYTHROCYTE [DISTWIDTH] IN BLOOD BY AUTOMATED COUNT: 12.2 % (ref 11.6–15.1)
GFR SERPL CREATININE-BSD FRML MDRD: 55 ML/MIN/1.73SQ M
GLUCOSE P FAST SERPL-MCNC: 109 MG/DL (ref 65–99)
HCT VFR BLD AUTO: 47.2 % (ref 34.8–46.1)
HDLC SERPL-MCNC: 56 MG/DL
HGB BLD-MCNC: 15.4 G/DL (ref 11.5–15.4)
IMM GRANULOCYTES # BLD AUTO: 0.01 THOUSAND/UL (ref 0–0.2)
IMM GRANULOCYTES NFR BLD AUTO: 0 % (ref 0–2)
LDLC SERPL CALC-MCNC: 126 MG/DL (ref 0–100)
LYMPHOCYTES # BLD AUTO: 2.16 THOUSANDS/ÂΜL (ref 0.6–4.47)
LYMPHOCYTES NFR BLD AUTO: 39 % (ref 14–44)
MCH RBC QN AUTO: 29.8 PG (ref 26.8–34.3)
MCHC RBC AUTO-ENTMCNC: 32.6 G/DL (ref 31.4–37.4)
MCV RBC AUTO: 91 FL (ref 82–98)
MONOCYTES # BLD AUTO: 0.43 THOUSAND/ÂΜL (ref 0.17–1.22)
MONOCYTES NFR BLD AUTO: 8 % (ref 4–12)
NEUTROPHILS # BLD AUTO: 2.68 THOUSANDS/ÂΜL (ref 1.85–7.62)
NEUTS SEG NFR BLD AUTO: 49 % (ref 43–75)
NRBC BLD AUTO-RTO: 0 /100 WBCS
PLATELET # BLD AUTO: 304 THOUSANDS/UL (ref 149–390)
PMV BLD AUTO: 9.7 FL (ref 8.9–12.7)
POTASSIUM SERPL-SCNC: 4.1 MMOL/L (ref 3.5–5.3)
PROT SERPL-MCNC: 7.5 G/DL (ref 6.4–8.4)
RBC # BLD AUTO: 5.17 MILLION/UL (ref 3.81–5.12)
SODIUM SERPL-SCNC: 137 MMOL/L (ref 135–147)
TRIGL SERPL-MCNC: 166 MG/DL
WBC # BLD AUTO: 5.5 THOUSAND/UL (ref 4.31–10.16)

## 2022-11-09 ENCOUNTER — OFFICE VISIT (OUTPATIENT)
Dept: FAMILY MEDICINE CLINIC | Facility: CLINIC | Age: 67
End: 2022-11-09

## 2022-11-09 VITALS
OXYGEN SATURATION: 97 % | HEIGHT: 62 IN | SYSTOLIC BLOOD PRESSURE: 130 MMHG | RESPIRATION RATE: 16 BRPM | DIASTOLIC BLOOD PRESSURE: 78 MMHG | WEIGHT: 166.2 LBS | BODY MASS INDEX: 30.59 KG/M2 | HEART RATE: 70 BPM | TEMPERATURE: 97.4 F

## 2022-11-09 DIAGNOSIS — E78.00 PURE HYPERCHOLESTEROLEMIA: ICD-10-CM

## 2022-11-09 DIAGNOSIS — I10 PRIMARY HYPERTENSION: ICD-10-CM

## 2022-11-09 DIAGNOSIS — N39.3 FEMALE STRESS INCONTINENCE: ICD-10-CM

## 2022-11-09 DIAGNOSIS — R74.8 ELEVATED LIVER ENZYMES: ICD-10-CM

## 2022-11-09 DIAGNOSIS — Z00.00 WELCOME TO MEDICARE PREVENTIVE VISIT: Primary | ICD-10-CM

## 2022-11-09 DIAGNOSIS — Z23 ENCOUNTER FOR IMMUNIZATION: ICD-10-CM

## 2022-11-09 PROBLEM — R31.0 GROSS HEMATURIA: Status: RESOLVED | Noted: 2022-08-03 | Resolved: 2022-11-09

## 2022-11-09 PROBLEM — M85.859 OSTEOPENIA OF HIP: Status: RESOLVED | Noted: 2019-05-24 | Resolved: 2022-11-09

## 2022-11-09 RX ORDER — OLMESARTAN MEDOXOMIL 20 MG/1
20 TABLET ORAL DAILY
Qty: 90 TABLET | Refills: 3 | Status: SHIPPED | OUTPATIENT
Start: 2022-11-09

## 2022-11-09 NOTE — PROGRESS NOTES
Assessment and Plan:     Problem List Items Addressed This Visit        Cardiovascular and Mediastinum    Hypertension     Stable on current meds          Relevant Medications    olmesartan (BENICAR) 20 mg tablet    Other Relevant Orders    Comprehensive metabolic panel    CBC and differential       Other    Pure hypercholesterolemia     Diet controlled  To add omega 3 fatty acid daily         Relevant Orders    Lipid panel      Other Visit Diagnoses     Welcome to Medicare preventive visit    -  Primary    Relevant Orders    POCT ECG (Completed)    Female stress incontinence        Elevated liver enzymes        elevated for the last few years  to get ultrasound to evaluate liver and gallbladder    Relevant Orders    US abdomen complete        BMI Counseling: Body mass index is 30 66 kg/m²  The BMI is above normal  Nutrition recommendations include decreasing portion sizes and encouraging healthy choices of fruits and vegetables  Exercise recommendations include moderate physical activity 150 minutes/week  No pharmacotherapy was ordered  Rationale for BMI follow-up plan is due to patient being overweight or obese  Depression Screening and Follow-up Plan: Patient was screened for depression during today's encounter  They screened negative with a PHQ-2 score of 2  Urinary Incontinence Plan of Care: counseling topics discussed: practice Kegel (pelvic floor strengthening) exercises, limiting fluid intake 3-4 hours before bed and preventing constipation  Preventive health issues were discussed with patient, and age appropriate screening tests were ordered as noted in patient's After Visit Summary  Personalized health advice and appropriate referrals for health education or preventive services given if needed, as noted in patient's After Visit Summary  History of Present Illness:     Patient presents for a Medicare Wellness Visit    Hypertension  This is a chronic problem   The current episode started more than 1 year ago  The problem has been gradually improving since onset  The problem is controlled  Pertinent negatives include no anxiety, blurred vision, chest pain, headaches, malaise/fatigue, neck pain, orthopnea, palpitations, peripheral edema, PND, shortness of breath or sweats  Risk factors for coronary artery disease include obesity and post-menopausal state  The current treatment provides mild improvement  There are no compliance problems  There is no history of angina, kidney disease, CAD/MI, CVA, heart failure, left ventricular hypertrophy, PVD or retinopathy  There is no history of chronic renal disease or a hypertension causing med  Patient Care Team:  Jackie Mejia MD as PCP - General (Family Medicine)     Review of Systems:     Review of Systems   Constitutional: Negative for malaise/fatigue  Eyes: Negative for blurred vision  Respiratory: Negative for shortness of breath  Cardiovascular: Negative for chest pain, palpitations, orthopnea and PND  Musculoskeletal: Negative for neck pain  Neurological: Negative for headaches          Problem List:     Patient Active Problem List   Diagnosis   • Arthralgia of both knees   • Hypertension   • Pure hypercholesterolemia      Past Medical and Surgical History:     Past Medical History:   Diagnosis Date   • Fibroid 1999   • Fibroids     H/O   • Hyperlipemia    • Hypertension    • Menometrorrhagia 11/2013    + ENLARGING FIBROIDS + ABNORMAL ENDOMETRIAL COMPLEX   • Miscarriage 1983   • Other specified health status     ERT     Past Surgical History:   Procedure Laterality Date   • APPENDECTOMY     • DILATION AND CURETTAGE OF UTERUS  03/2014    EMB, D&C BENIGN ENDOMETRIAL POLYP    • ELBOW SURGERY Left 1969   • ELBOW SURGERY Left    • ENDOMETRIAL BIOPSY  03/2014    EMB, D&C BENIGN ENDOMETRIAL POLYP    • HYSTERECTOMY  10/2014    Total   • TOTAL ABDOMINAL HYSTERECTOMY W/ BILATERAL SALPINGOOPHORECTOMY Left 2002    [address] [facility]   • TUBAL LIGATION     • WISDOM TOOTH EXTRACTION        Family History:     Family History   Problem Relation Age of Onset   • Heart disease Mother    • Osteoporosis Mother    • Heart attack Father    • Hyperlipidemia Father    • No Known Problems Sister    • No Known Problems Sister    • No Known Problems Daughter    • Ovarian cancer Maternal Grandmother    • Osteoarthritis Maternal Grandmother    • No Known Problems Maternal Grandfather    • No Known Problems Paternal Grandmother    • No Known Problems Paternal Grandfather    • No Known Problems Maternal Aunt    • No Known Problems Maternal Aunt    • Skin cancer Maternal Aunt    • No Known Problems Paternal Aunt    • No Known Problems Paternal Aunt    • No Known Problems Paternal Aunt    • No Known Problems Paternal Aunt    • No Known Problems Son       Social History:     Social History     Socioeconomic History   • Marital status: /Civil Union     Spouse name: None   • Number of children: None   • Years of education: None   • Highest education level: None   Occupational History   • None   Tobacco Use   • Smoking status: Never Smoker   • Smokeless tobacco: Never Used   • Tobacco comment: NO TOBACCO USE   Substance and Sexual Activity   • Alcohol use: Not Currently     Alcohol/week: 0 0 standard drinks   • Drug use: Never   • Sexual activity: Yes     Partners: Male     Birth control/protection: Post-menopausal, Female Sterilization     Comment: Total hysterectomy 2014   Other Topics Concern   • None   Social History Narrative   • None     Social Determinants of Health     Financial Resource Strain: Low Risk    • Difficulty of Paying Living Expenses: Not hard at all   Food Insecurity: Not on file   Transportation Needs: No Transportation Needs   • Lack of Transportation (Medical): No   • Lack of Transportation (Non-Medical):  No   Physical Activity: Not on file   Stress: Not on file   Social Connections: Not on file   Intimate Partner Violence: Not on file   Housing Stability: Not on file      Medications and Allergies:     Current Outpatient Medications   Medication Sig Dispense Refill   • amLODIPine (NORVASC) 2 5 mg tablet Take 1 tablet (2 5 mg total) by mouth daily 90 tablet 3   • Multiple Vitamins tablet Take 1 tablet by mouth daily     • olmesartan (BENICAR) 20 mg tablet Take 1 tablet (20 mg total) by mouth daily 90 tablet 3   • Probiotic Product (UP4 PROBIOTICS ADULT PO) Take by mouth daily       • Cholecalciferol (VITAMIN D3 PO) Take 5,000 Units by mouth (Patient not taking: Reported on 11/9/2022)     • MAGNESIUM PO Take by mouth daily   (Patient not taking: Reported on 11/9/2022)       No current facility-administered medications for this visit  Allergies   Allergen Reactions   • Pedi-Pre Tape Spray [Wound Dressing Adhesive] Rash      Immunizations:     Immunization History   Administered Date(s) Administered   • COVID-19 PFIZER VACCINE 0 3 ML IM 03/30/2021, 04/20/2021, 11/29/2021   • COVID-19 Pfizer Vac BIVALENT Curtis-sucrose 12 Yr+ IM (BOOSTER ONLY) 09/27/2022   • Influenza Quadrivalent 3 years and older 12/11/2018   • Influenza Quadrivalent Recombinant Preservative Free IM 11/19/2019, 11/19/2020   • Influenza, recombinant, quadrivalent,injectable, preservative free 11/08/2021   • Tdap 05/23/2017      Health Maintenance:         Topic Date Due   • Breast Cancer Screening: Mammogram  08/29/2024   • Colorectal Cancer Screening  10/10/2027   • HIV Screening  Completed   • Hepatitis C Screening  Completed         Topic Date Due   • COVID-19 Vaccine (4 - Booster for Pfizer series) 03/29/2022   • Pneumococcal Vaccine: 65+ Years (1 - PCV) Never done   • Influenza Vaccine (1) 09/01/2022      Medicare Screening Tests and Risk Assessments:     Esau Munguia is here for her Welcome to Medicare visit  Last Medicare Wellness visit information reviewed, patient interviewed, no change since last AWV  Health Risk Assessment:   Patient rates overall health as very good   Patient feels that their physical health rating is slightly better  Patient is satisfied with their life  Eyesight was rated as slightly worse  Hearing was rated as same  Patient feels that their emotional and mental health rating is same  Patients states they are sometimes angry  Patient states they are sometimes unusually tired/fatigued  Pain experienced in the last 7 days has been none  Patient states that she has experienced no weight loss or gain in last 6 months  Depression Screening:   PHQ-2 Score: 2      Fall Risk Screening: In the past year, patient has experienced: no history of falling in past year      Urinary Incontinence Screening:   Patient has leaked urine accidently in the last six months  Sneezing will sometimes trigger it    Home Safety:  Patient does not have trouble with stairs inside or outside of their home  Patient has working smoke alarms and has working carbon monoxide detector  Home safety hazards include: none  Nutrition:   Current diet is Regular and No Added Salt  Medications:   Patient is currently taking over-the-counter supplements  OTC medications include: Multivitamins, vitamin D3, probiotics, plant sterols  Patient is able to manage medications  Activities of Daily Living (ADLs)/Instrumental Activities of Daily Living (IADLs):   Walk and transfer into and out of bed and chair?: Yes  Dress and groom yourself?: Yes    Bathe or shower yourself?: Yes    Feed yourself? Yes  Do your laundry/housekeeping?: Yes  Manage your money, pay your bills and track your expenses?: Yes  Make your own meals?: Yes    Do your own shopping?: Yes    Previous Hospitalizations:   Any hospitalizations or ED visits within the last 12 months?: Yes    How many hospitalizations have you had in the last year?: 1-2    Hospitalization Comments: 2 hip replacements    Advance Care Planning:   Living will: Yes    Durable POA for healthcare:  Yes    Advanced directive: Yes      Cognitive Screening:   Provider or family/friend/caregiver concerned regarding cognition?: No    PREVENTIVE SCREENINGS      Cardiovascular Screening:    General: Screening Not Indicated and History Lipid Disorder      Diabetes Screening:     General: Screening Current      Colorectal Cancer Screening:     General: Screening Current      Breast Cancer Screening:     General: Screening Current      Cervical Cancer Screening:    General: Screening Not Indicated      Osteoporosis Screening:    General: Screening Current      Abdominal Aortic Aneurysm (AAA) Screening:        General: Screening Not Indicated      Lung Cancer Screening:     General: Screening Not Indicated      Hepatitis C Screening:    General: Screening Current    Screening, Brief Intervention, and Referral to Treatment (SBIRT)    Screening  Typical number of drinks in a day: 0  Typical number of drinks in a week: 0  Interpretation: Low risk drinking behavior  AUDIT-C Screenin) How often did you have a drink containing alcohol in the past year? monthly or less  2) How many drinks did you have on a typical day when you were drinking in the past year? 1 to 2  3) How often did you have 6 or more drinks on one occasion in the past year? never    AUDIT-C Score: 1  Interpretation: Score 0-2 (female): Negative screen for alcohol misuse    Single Item Drug Screening:  How often have you used an illegal drug (including marijuana) or a prescription medication for non-medical reasons in the past year? never    Single Item Drug Screen Score: 0  Interpretation: Negative screen for possible drug use disorder    Other Counseling Topics:   Car/seat belt/driving safety and calcium and vitamin D intake        Visual Acuity Screening    Right eye Left eye Both eyes   Without correction: 20/30 20/25 20/20   With correction:           Physical Exam:     /78 (BP Location: Left arm, Patient Position: Sitting, Cuff Size: Adult)   Pulse 70   Temp (!) 97 4 °F (36 3 °C) (Tympanic)   Resp 16   Ht 5' 1 73" (1 568 m)   Wt 75 4 kg (166 lb 3 2 oz)   SpO2 97%   BMI 30 66 kg/m²     Physical Exam  Vitals and nursing note reviewed  Constitutional:       Appearance: Normal appearance  She is well-developed  HENT:      Head: Normocephalic and atraumatic  Right Ear: Tympanic membrane normal       Left Ear: Tympanic membrane normal       Nose: Nose normal       Mouth/Throat:      Mouth: Mucous membranes are moist    Eyes:      Pupils: Pupils are equal, round, and reactive to light  Cardiovascular:      Rate and Rhythm: Normal rate and regular rhythm  Pulses: Normal pulses  Heart sounds: Normal heart sounds  Pulmonary:      Effort: Pulmonary effort is normal  No respiratory distress  Breath sounds: Normal breath sounds  No wheezing  Abdominal:      General: Bowel sounds are normal       Palpations: Abdomen is soft  Musculoskeletal:         General: No deformity  Normal range of motion  Cervical back: Normal range of motion and neck supple  Skin:     General: Skin is warm  Capillary Refill: Capillary refill takes less than 2 seconds  Neurological:      General: No focal deficit present  Mental Status: She is alert and oriented to person, place, and time     Psychiatric:         Mood and Affect: Mood normal          Behavior: Behavior normal           Ledy Taylor MD

## 2022-11-09 NOTE — PATIENT INSTRUCTIONS
Medicare Preventive Visit Patient Instructions  Thank you for completing your Welcome to Medicare Visit or Medicare Annual Wellness Visit today  Your next wellness visit will be due in one year (11/10/2023)  The screening/preventive services that you may require over the next 5-10 years are detailed below  Some tests may not apply to you based off risk factors and/or age  Screening tests ordered at today's visit but not completed yet may show as past due  Also, please note that scanned in results may not display below  Preventive Screenings:  Service Recommendations Previous Testing/Comments   Colorectal Cancer Screening  * Colonoscopy    * Fecal Occult Blood Test (FOBT)/Fecal Immunochemical Test (FIT)  * Fecal DNA/Cologuard Test  * Flexible Sigmoidoscopy Age: 39-70 years old   Colonoscopy: every 10 years (may be performed more frequently if at higher risk)  OR  FOBT/FIT: every 1 year  OR  Cologuard: every 3 years  OR  Sigmoidoscopy: every 5 years  Screening may be recommended earlier than age 39 if at higher risk for colorectal cancer  Also, an individualized decision between you and your healthcare provider will decide whether screening between the ages of 74-80 would be appropriate  Colonoscopy: 10/10/2017  FOBT/FIT: Not on file  Cologuard: Not on file  Sigmoidoscopy: Not on file    Screening Current     Breast Cancer Screening Age: 36 years old  Frequency: every 1-2 years  Not required if history of left and right mastectomy Mammogram: 08/29/2022    Screening Current   Cervical Cancer Screening Between the ages of 21-29, pap smear recommended once every 3 years  Between the ages of 33-67, can perform pap smear with HPV co-testing every 5 years     Recommendations may differ for women with a history of total hysterectomy, cervical cancer, or abnormal pap smears in past  Pap Smear: 04/25/2018    Screening Not Indicated   Hepatitis C Screening Once for adults born between 1945 and 1965  More frequently in patients at high risk for Hepatitis C Hep C Antibody: 11/18/2021    Screening Current   Diabetes Screening 1-2 times per year if you're at risk for diabetes or have pre-diabetes Fasting glucose: 109 mg/dL (11/3/2022)  A1C: No results in last 5 years (No results in last 5 years)  Screening Current   Cholesterol Screening Once every 5 years if you don't have a lipid disorder  May order more often based on risk factors  Lipid panel: 11/03/2022    Screening Not Indicated  History Lipid Disorder     Other Preventive Screenings Covered by Medicare:  1  Abdominal Aortic Aneurysm (AAA) Screening: covered once if your at risk  You're considered to be at risk if you have a family history of AAA  2  Lung Cancer Screening: covers low dose CT scan once per year if you meet all of the following conditions: (1) Age 50-69; (2) No signs or symptoms of lung cancer; (3) Current smoker or have quit smoking within the last 15 years; (4) You have a tobacco smoking history of at least 20 pack years (packs per day multiplied by number of years you smoked); (5) You get a written order from a healthcare provider  3  Glaucoma Screening: covered annually if you're considered high risk: (1) You have diabetes OR (2) Family history of glaucoma OR (3)  aged 48 and older OR (3)  American aged 72 and older  3  Osteoporosis Screening: covered every 2 years if you meet one of the following conditions: (1) You're estrogen deficient and at risk for osteoporosis based off medical history and other findings; (2) Have a vertebral abnormality; (3) On glucocorticoid therapy for more than 3 months; (4) Have primary hyperparathyroidism; (5) On osteoporosis medications and need to assess response to drug therapy  · Last bone density test (DXA Scan): 06/23/2022   5  HIV Screening: covered annually if you're between the age of 15-65  Also covered annually if you are younger than 13 and older than 72 with risk factors for HIV infection  For pregnant patients, it is covered up to 3 times per pregnancy  Immunizations:  Immunization Recommendations   Influenza Vaccine Annual influenza vaccination during flu season is recommended for all persons aged >= 6 months who do not have contraindications   Pneumococcal Vaccine   * Pneumococcal conjugate vaccine = PCV13 (Prevnar 13), PCV15 (Vaxneuvance), PCV20 (Prevnar 20)  * Pneumococcal polysaccharide vaccine = PPSV23 (Pneumovax) Adults 25-60 years old: 1-3 doses may be recommended based on certain risk factors  Adults 72 years old: 1-2 doses may be recommended based off what pneumonia vaccine you previously received   Hepatitis B Vaccine 3 dose series if at intermediate or high risk (ex: diabetes, end stage renal disease, liver disease)   Tetanus (Td) Vaccine - COST NOT COVERED BY MEDICARE PART B Following completion of primary series, a booster dose should be given every 10 years to maintain immunity against tetanus  Td may also be given as tetanus wound prophylaxis  Tdap Vaccine - COST NOT COVERED BY MEDICARE PART B Recommended at least once for all adults  For pregnant patients, recommended with each pregnancy  Shingles Vaccine (Shingrix) - COST NOT COVERED BY MEDICARE PART B  2 shot series recommended in those aged 48 and above     Health Maintenance Due:      Topic Date Due   • Breast Cancer Screening: Mammogram  08/29/2024   • Colorectal Cancer Screening  10/10/2027   • HIV Screening  Completed   • Hepatitis C Screening  Completed     Immunizations Due:      Topic Date Due   • COVID-19 Vaccine (4 - Booster for Pfizer series) 03/29/2022   • Pneumococcal Vaccine: 65+ Years (1 - PCV) Never done   • Influenza Vaccine (1) 09/01/2022     Advance Directives   What are advance directives? Advance directives are legal documents that state your wishes and plans for medical care  These plans are made ahead of time in case you lose your ability to make decisions for yourself   Advance directives can apply to any medical decision, such as the treatments you want, and if you want to donate organs  What are the types of advance directives? There are many types of advance directives, and each state has rules about how to use them  You may choose a combination of any of the following:  · Living will: This is a written record of the treatment you want  You can also choose which treatments you do not want, which to limit, and which to stop at a certain time  This includes surgery, medicine, IV fluid, and tube feedings  · Durable power of  for healthcare [facility] [place]): This is a written record that states who you want to make healthcare choices for you when you are unable to make them for yourself  This person, called a proxy, is usually a family member or a friend  You may choose more than 1 proxy  · Do not resuscitate (DNR) order:  A DNR order is used in case your heart stops beating or you stop breathing  It is a request not to have certain forms of treatment, such as CPR  A DNR order may be included in other types of advance directives  · Medical directive: This covers the care that you want if you are in a coma, near death, or unable to make decisions for yourself  You can list the treatments you want for each condition  Treatment may include pain medicine, surgery, blood transfusions, dialysis, IV or tube feedings, and a ventilator (breathing machine)  · Values history: This document has questions about your views, beliefs, and how you feel and think about life  This information can help others choose the care that you would choose  Why are advance directives important? An advance directive helps you control your care  Although spoken wishes may be used, it is better to have your wishes written down  Spoken wishes can be misunderstood, or not followed  Treatments may be given even if you do not want them  An advance directive may make it easier for your family to make difficult choices about your care  Urinary Incontinence   Urinary incontinence (UI)  is when you lose control of your bladder  UI develops because your bladder cannot store or empty urine properly  The 3 most common types of UI are stress incontinence, urge incontinence, or both  Medicines:   · May be given to help strengthen your bladder control  Report any side effects of medication to your healthcare provider  Do pelvic muscle exercises often:  Your pelvic muscles help you stop urinating  Squeeze these muscles tight for 5 seconds, then relax for 5 seconds  Gradually work up to squeezing for 10 seconds  Do 3 sets of 15 repetitions a day, or as directed  This will help strengthen your pelvic muscles and improve bladder control  Train your bladder:  Go to the bathroom at set times, such as every 2 hours, even if you do not feel the urge to go  You can also try to hold your urine when you feel the urge to go  For example, hold your urine for 5 minutes when you feel the urge to go  As that becomes easier, hold your urine for 10 minutes  Self-care:   · Keep a UI record  Write down how often you leak urine and how much you leak  Make a note of what you were doing when you leaked urine  · Drink liquids as directed  You may need to limit the amount of liquid you drink to help control your urine leakage  Do not drink any liquid right before you go to bed  Limit or do not have drinks that contain caffeine or alcohol  · Prevent constipation  Eat a variety of high-fiber foods  Good examples are high-fiber cereals, beans, vegetables, and whole-grain breads  Walking is the best way to trigger your intestines to have a bowel movement  · Exercise regularly and maintain a healthy weight  Weight loss and exercise will decrease pressure on your bladder and help you control your leakage  · Use a catheter as directed  to help empty your bladder  A catheter is a tiny, plastic tube that is put into your bladder to drain your urine     · Go to behavior therapy as directed  Behavior therapy may be used to help you learn to control your urge to urinate  Weight Management   Why it is important to manage your weight:  Being overweight increases your risk of health conditions such as heart disease, high blood pressure, type 2 diabetes, and certain types of cancer  It can also increase your risk for osteoarthritis, sleep apnea, and other respiratory problems  Aim for a slow, steady weight loss  Even a small amount of weight loss can lower your risk of health problems  How to lose weight safely:  A safe and healthy way to lose weight is to eat fewer calories and get regular exercise  You can lose up about 1 pound a week by decreasing the number of calories you eat by 500 calories each day  Healthy meal plan for weight management:  A healthy meal plan includes a variety of foods, contains fewer calories, and helps you stay healthy  A healthy meal plan includes the following:  · Eat whole-grain foods more often  A healthy meal plan should contain fiber  Fiber is the part of grains, fruits, and vegetables that is not broken down by your body  Whole-grain foods are healthy and provide extra fiber in your diet  Some examples of whole-grain foods are whole-wheat breads and pastas, oatmeal, brown rice, and bulgur  · Eat a variety of vegetables every day  Include dark, leafy greens such as spinach, kale, trice greens, and mustard greens  Eat yellow and orange vegetables such as carrots, sweet potatoes, and winter squash  · Eat a variety of fruits every day  Choose fresh or canned fruit (canned in its own juice or light syrup) instead of juice  Fruit juice has very little or no fiber  · Eat low-fat dairy foods  Drink fat-free (skim) milk or 1% milk  Eat fat-free yogurt and low-fat cottage cheese  Try low-fat cheeses such as mozzarella and other reduced-fat cheeses  · Choose meat and other protein foods that are low in fat    Choose beans or other legumes such as split peas or lentils  Choose fish, skinless poultry (chicken or turkey), or lean cuts of red meat (beef or pork)  Before you cook meat or poultry, cut off any visible fat  · Use less fat and oil  Try baking foods instead of frying them  Add less fat, such as margarine, sour cream, regular salad dressing and mayonnaise to foods  Eat fewer high-fat foods  Some examples of high-fat foods include french fries, doughnuts, ice cream, and cakes  · Eat fewer sweets  Limit foods and drinks that are high in sugar  This includes candy, cookies, regular soda, and sweetened drinks  Exercise:  Exercise at least 30 minutes per day on most days of the week  Some examples of exercise include walking, biking, dancing, and swimming  You can also fit in more physical activity by taking the stairs instead of the elevator or parking farther away from stores  Ask your healthcare provider about the best exercise plan for you  © Copyright [employer] 2018 Information is for End User's use only and may not be sold, redistributed or otherwise used for commercial purposes  All illustrations and images included in CareNotes® are the copyrighted property of A D A M , Inc  or [facility] Karen Olguin     Urinary Incontinence   AMBULATORY CARE:   Urinary incontinence (UI)  is when you lose control of your bladder  UI develops because your bladder cannot store or empty urine properly  The 3 most common types of UI are stress incontinence, urge incontinence, or both  Common symptoms include the following:   · You feel like your bladder does not empty completely when you urinate  · You urinate often and need to urinate immediately  · You leak urine when you sleep, or you wake up with the urge to urinate  · You leak urine when you cough, sneeze, exercise, or laugh  Call your doctor if:   · You have severe pain  · You are confused or cannot think clearly  · You have a fever  · You see blood in your urine      · You have pain when you urinate  · You have new or worse pain, even after treatment  · Your mouth feels dry or you have vision changes  · Your urine is cloudy or smells bad  · You have questions or concerns about your condition or care  Medicines:   · Medicines  may be given to help strengthen your bladder control  · Take your medicine as directed  Contact your healthcare provider if you think your medicine is not helping or if you have side effects  Tell him or her if you are allergic to any medicine  Keep a list of the medicines, vitamins, and herbs you take  Include the amounts, and when and why you take them  Bring the list or the pill bottles to follow-up visits  Carry your medicine list with you in case of an emergency  Do pelvic muscle exercises often:  Your pelvic muscles help you stop urinating  Squeeze these muscles tight for 5 seconds, then relax for 5 seconds  Gradually work up to squeezing for 10 seconds  Do 3 sets of 15 repetitions a day, or as directed  This will help strengthen your pelvic muscles and improve bladder control  Train your bladder:  Go to the bathroom at set times, such as every 2 hours, even if you do not feel the urge to go  You can also try to hold your urine when you feel the urge to go  For example, hold your urine for 5 minutes when you feel the urge to go  As that becomes easier, hold your urine for 10 minutes  Self-care:   · Keep a UI record  Write down how often you leak urine and how much you leak  Make a note of what you were doing when you leaked urine  · Drink liquids as directed  Ask your healthcare provider how much liquid to drink each day and which liquids are best for you  You may need to limit the amount of liquid you drink to help control your urine leakage  Do not drink any liquid right before you go to bed  Limit or do not have drinks that contain caffeine or alcohol  · Prevent constipation  Eat a variety of high-fiber foods   Good examples are high-fiber cereals, beans, vegetables, and whole-grain breads  Prune juice may help make your bowel movement softer  Walking is the best way to trigger your intestines to have a bowel movement  · Exercise regularly and maintain a healthy weight  Ask your healthcare provider how much you should weigh and about the best exercise plan for you  Weight loss and exercise will decrease pressure on your bladder and help you control your leakage  Ask him or her to help you create a weight loss plan if you are overweight  · Use a catheter as directed  to help empty your bladder  A catheter is a tiny, plastic tube that is put into your bladder to drain your urine  Your healthcare provider may tell you to use a catheter to prevent your bladder from getting too full and leaking urine  · Go to behavior therapy as directed  Behavior therapy may be used to help you learn to control your urge to urinate  Follow up with your doctor as directed:  Write down your questions so you remember to ask them during your visits  © [employer] 2022 Information is for End User's use only and may not be sold, redistributed or otherwise used for commercial purposes  All illustrations and images included in CareNotes® are the copyrighted property of A D A M , Inc  or [facility]  The above information is an  only  It is not intended as medical advice for individual conditions or treatments  Talk to your doctor, nurse or pharmacist before following any medical regimen to see if it is safe and effective for you  Kegel Exercises for Women   AMBULATORY CARE:   Kegel exercises  help strengthen your pelvic muscles  Pelvic muscles hold your pelvic organs, such as your bladder and uterus, in place  Kegel exercises help prevent or control problems with urine incontinence (leakage)  Incontinence may be caused by pregnancy, childbirth, or menopause     Contact your healthcare provider if:   · You cannot feel your muscles tighten or relax  · You continue to leak urine  · You have questions or concerns about your condition or care  Use the correct muscles:  Pelvic muscles are the muscles you use to control urine flow  To target these muscles, stop and start the flow of urine several times  This will help you become familiar with how it feels to tighten and relax these muscles  How to do Kegel exercises:   · Empty your bladder  You may lie down, stand up, or sit down to do these exercises  When you first try to do these exercises, it may be easier if you lie down  Tighten or squeeze your pelvic muscles slowly  It may feel like you are trying to hold back urine or gas  Hold this position for 3 seconds  Relax for 3 seconds  Repeat this cycle 10 times  · Do 10 sets of Kegel exercises, at least 3 times a day  Do not hold your breath when you do Kegel exercises  Keep your stomach, back, and leg muscles relaxed  · As your muscles get stronger, you will be able to hold the squeeze longer  Your healthcare provider may ask that you increase your pelvic muscle squeeze to 10 seconds  After you squeeze for 10 seconds, relax for 10 seconds  What else you should know:   · Once you know how to do Kegel exercises, use different positions  You can do these exercises while you lie on the floor, sit at your desk or watch TV, and while you stand  · You may notice improved bladder control within about 6 weeks  · Tighten your pelvic muscles before you sneeze, cough, or lift to prevent urine leakage  Follow up with your doctor as directed:  Write down your questions so you remember to ask them during your visits  © Copyright [address] 2022 Information is for End User's use only and may not be sold, redistributed or otherwise used for commercial purposes   All illustrations and images included in CareNotes® are the copyrighted property of [employer]  or [employer]  The above information is an  only  It is not intended as medical advice for individual conditions or treatments  Talk to your doctor, nurse or pharmacist before following any medical regimen to see if it is safe and effective for you

## 2022-11-11 ENCOUNTER — HOSPITAL ENCOUNTER (OUTPATIENT)
Dept: RADIOLOGY | Age: 67
Discharge: HOME | End: 2022-11-11

## 2022-11-11 DIAGNOSIS — R74.8 ELEVATED LIVER ENZYMES: ICD-10-CM

## 2022-12-01 ENCOUNTER — OFFICE VISIT (OUTPATIENT)
Dept: FAMILY MEDICINE CLINIC | Facility: CLINIC | Age: 67
End: 2022-12-01

## 2022-12-01 VITALS
HEIGHT: 62 IN | DIASTOLIC BLOOD PRESSURE: 90 MMHG | SYSTOLIC BLOOD PRESSURE: 150 MMHG | BODY MASS INDEX: 30.77 KG/M2 | TEMPERATURE: 96.3 F | OXYGEN SATURATION: 99 % | RESPIRATION RATE: 16 BRPM | WEIGHT: 167.2 LBS | HEART RATE: 65 BPM

## 2022-12-01 DIAGNOSIS — Z23 ENCOUNTER FOR IMMUNIZATION: ICD-10-CM

## 2022-12-01 DIAGNOSIS — R19.7 DIARRHEA, UNSPECIFIED TYPE: ICD-10-CM

## 2022-12-01 DIAGNOSIS — L98.9 PERINEAL IRRITATION IN FEMALE: Primary | ICD-10-CM

## 2022-12-01 RX ORDER — DESONIDE 0.5 MG/G
CREAM TOPICAL 2 TIMES DAILY
Qty: 30 G | Refills: 0 | Status: SHIPPED | OUTPATIENT
Start: 2022-12-01

## 2022-12-01 NOTE — PROGRESS NOTES
Name: Mauricio Townsend      : 1955      MRN: [id number]  Encounter Provider: Franny Rucker MD  Encounter Date: 2022   Encounter department: [place] [address]  Perineal irritation in female  Assessment & Plan:  Had a bout of diarrhea for 3 days after switching probitoics and mg supplement  Now resolved  Did develop perineal irritation  Reports burning  Currently using triple abx and pain has improved  Perineal irriation noted on exam  Start zinc oxide  If not improved, may try a low potency steroid ( desonide BID for up to 1 week )  Advised not to use longer due to the risk of atrophy and further irritation  Orders:  -     desonide (DESOWEN) 0 05 % cream; Apply topically 2 (two) times a day    2  Diarrhea, unspecified type  Comments:  Rsolved     3  Encounter for immunization  Comments:  Prevnar 20 consented and adminsitered today   Orders:  -     Pneumococcal Conjugate Vaccine 20-valent (Pcv20)           Subjective      Here with diarrhea  Started last Friday and lasted 2 days  Now with a irritation in the groin  Area burns  Recently switched her mag tabs last week ( citrate to gluconate) and probiotics  Started using a triple antibiotics and has improved  No blood in the stools but when she wiped on Saturday she did notice blood on the toilet  Stools are more formed  Review of Systems   Constitutional: Negative for fever  Gastrointestinal: Positive for abdominal pain, anal bleeding, diarrhea and rectal pain  Negative for blood in stool  Skin: Positive for rash (groin )         Current Outpatient Medications on File Prior to Visit   Medication Sig   • amLODIPine (NORVASC) 2 5 mg tablet Take 1 tablet (2 5 mg total) by mouth daily   • Cholecalciferol (VITAMIN D3 PO) Take 5,000 Units by mouth   • MAGNESIUM PO Take by mouth daily   • Multiple Vitamins tablet Take 1 tablet by mouth daily   • olmesartan (BENICAR) 20 mg tablet Take 1 tablet (20 mg total) by mouth daily   • Probiotic Product (UP4 PROBIOTICS ADULT PO) Take by mouth daily         Objective     /90 (BP Location: Left arm, Patient Position: Sitting, Cuff Size: Adult)   Pulse 65   Temp (!) 96 3 °F (35 7 °C) (Tympanic)   Resp 16   Ht 5' 1 73" (1 568 m)   Wt 75 8 kg (167 lb 3 2 oz)   SpO2 99%   BMI 30 85 kg/m²     Physical Exam  Constitutional:       General: She is not in acute distress  Appearance: Normal appearance  HENT:      Head: Normocephalic and atraumatic  Cardiovascular:      Rate and Rhythm: Normal rate and regular rhythm  Pulmonary:      Effort: Pulmonary effort is normal    Abdominal:      General: Abdomen is flat  There is no distension  Tenderness: There is no abdominal tenderness  There is no guarding  Genitourinary:     Pubic Area: Rash present  Labia:         Right: Rash and tenderness present  Left: Rash and tenderness present  Comments: Erythema and tenderness around the perineum   Hemorrhoid tags   Neurological:      Mental Status: She is alert and oriented to person, place, and time  Psychiatric:         Mood and Affect: Mood normal          Behavior: Behavior normal          Thought Content:  Thought content normal        Franny Rucker MD

## 2022-12-01 NOTE — ASSESSMENT & PLAN NOTE
Had a bout of diarrhea for 3 days after switching probitoics and mg supplement  Now resolved  Did develop perineal irritation  Reports burning  Currently using triple abx and pain has improved  Perineal irriation noted on exam  Start zinc oxide  If not improved, may try a low potency steroid ( desonide BID for up to 1 week )  Advised not to use longer due to the risk of atrophy and further irritation

## 2023-03-27 DIAGNOSIS — I10 PRIMARY HYPERTENSION: ICD-10-CM

## 2023-03-27 RX ORDER — AMLODIPINE BESYLATE 2.5 MG/1
TABLET ORAL
Qty: 90 TABLET | Refills: 3 | Status: SHIPPED | OUTPATIENT
Start: 2023-03-27

## 2023-05-10 ENCOUNTER — APPOINTMENT (OUTPATIENT)
Dept: LAB | Facility: CLINIC | Age: 68
End: 2023-05-10

## 2023-05-10 LAB
ALBUMIN SERPL BCP-MCNC: 3.8 G/DL (ref 3.5–5)
ALP SERPL-CCNC: 118 U/L (ref 46–116)
ALT SERPL W P-5'-P-CCNC: 36 U/L (ref 12–78)
ANION GAP SERPL CALCULATED.3IONS-SCNC: 2 MMOL/L (ref 4–13)
AST SERPL W P-5'-P-CCNC: 30 U/L (ref 5–45)
BASOPHILS # BLD AUTO: 0.04 THOUSANDS/ÂΜL (ref 0–0.1)
BASOPHILS NFR BLD AUTO: 1 % (ref 0–1)
BILIRUB SERPL-MCNC: 0.58 MG/DL (ref 0.2–1)
BUN SERPL-MCNC: 17 MG/DL (ref 5–25)
CALCIUM SERPL-MCNC: 9.3 MG/DL (ref 8.3–10.1)
CHLORIDE SERPL-SCNC: 106 MMOL/L (ref 96–108)
CHOLEST SERPL-MCNC: 233 MG/DL
CO2 SERPL-SCNC: 28 MMOL/L (ref 21–32)
CREAT SERPL-MCNC: 1.03 MG/DL (ref 0.6–1.3)
EOSINOPHIL # BLD AUTO: 0.18 THOUSAND/ÂΜL (ref 0–0.61)
EOSINOPHIL NFR BLD AUTO: 3 % (ref 0–6)
ERYTHROCYTE [DISTWIDTH] IN BLOOD BY AUTOMATED COUNT: 12.2 % (ref 11.6–15.1)
GFR SERPL CREATININE-BSD FRML MDRD: 57 ML/MIN/1.73SQ M
GLUCOSE P FAST SERPL-MCNC: 92 MG/DL (ref 65–99)
HCT VFR BLD AUTO: 45.8 % (ref 34.8–46.1)
HDLC SERPL-MCNC: 57 MG/DL
HGB BLD-MCNC: 14.9 G/DL (ref 11.5–15.4)
IMM GRANULOCYTES # BLD AUTO: 0.01 THOUSAND/UL (ref 0–0.2)
IMM GRANULOCYTES NFR BLD AUTO: 0 % (ref 0–2)
LDLC SERPL CALC-MCNC: 156 MG/DL (ref 0–100)
LYMPHOCYTES # BLD AUTO: 2.94 THOUSANDS/ÂΜL (ref 0.6–4.47)
LYMPHOCYTES NFR BLD AUTO: 51 % (ref 14–44)
MCH RBC QN AUTO: 30 PG (ref 26.8–34.3)
MCHC RBC AUTO-ENTMCNC: 32.5 G/DL (ref 31.4–37.4)
MCV RBC AUTO: 92 FL (ref 82–98)
MONOCYTES # BLD AUTO: 0.4 THOUSAND/ÂΜL (ref 0.17–1.22)
MONOCYTES NFR BLD AUTO: 7 % (ref 4–12)
NEUTROPHILS # BLD AUTO: 2.21 THOUSANDS/ÂΜL (ref 1.85–7.62)
NEUTS SEG NFR BLD AUTO: 38 % (ref 43–75)
NONHDLC SERPL-MCNC: 176 MG/DL
NRBC BLD AUTO-RTO: 0 /100 WBCS
PLATELET # BLD AUTO: 303 THOUSANDS/UL (ref 149–390)
PMV BLD AUTO: 10 FL (ref 8.9–12.7)
POTASSIUM SERPL-SCNC: 4.1 MMOL/L (ref 3.5–5.3)
PROT SERPL-MCNC: 7.3 G/DL (ref 6.4–8.4)
RBC # BLD AUTO: 4.97 MILLION/UL (ref 3.81–5.12)
SODIUM SERPL-SCNC: 136 MMOL/L (ref 135–147)
TRIGL SERPL-MCNC: 99 MG/DL
WBC # BLD AUTO: 5.78 THOUSAND/UL (ref 4.31–10.16)

## 2023-05-15 ENCOUNTER — OFFICE VISIT (OUTPATIENT)
Dept: FAMILY MEDICINE CLINIC | Facility: CLINIC | Age: 68
End: 2023-05-15

## 2023-05-15 VITALS
DIASTOLIC BLOOD PRESSURE: 86 MMHG | HEART RATE: 89 BPM | HEIGHT: 62 IN | SYSTOLIC BLOOD PRESSURE: 132 MMHG | OXYGEN SATURATION: 98 % | RESPIRATION RATE: 14 BRPM | TEMPERATURE: 98.1 F | BODY MASS INDEX: 29.96 KG/M2 | WEIGHT: 162.8 LBS

## 2023-05-15 DIAGNOSIS — I10 PRIMARY HYPERTENSION: Primary | ICD-10-CM

## 2023-05-15 DIAGNOSIS — Z12.31 VISIT FOR SCREENING MAMMOGRAM: ICD-10-CM

## 2023-05-15 DIAGNOSIS — E78.00 PURE HYPERCHOLESTEROLEMIA: ICD-10-CM

## 2023-05-15 DIAGNOSIS — R35.0 URINARY FREQUENCY: ICD-10-CM

## 2023-05-15 DIAGNOSIS — H93.13 TINNITUS OF BOTH EARS: ICD-10-CM

## 2023-05-15 DIAGNOSIS — F41.9 ANXIETY: ICD-10-CM

## 2023-05-15 DIAGNOSIS — L65.9 HAIR LOSS: ICD-10-CM

## 2023-05-15 PROBLEM — L98.9 PERINEAL IRRITATION IN FEMALE: Status: RESOLVED | Noted: 2022-12-01 | Resolved: 2023-05-15

## 2023-05-15 LAB
SL AMB  POCT GLUCOSE, UA: NEGATIVE
SL AMB LEUKOCYTE ESTERASE,UA: NEGATIVE
SL AMB POCT BILIRUBIN,UA: NEGATIVE
SL AMB POCT BLOOD,UA: ABNORMAL
SL AMB POCT CLARITY,UA: CLEAR
SL AMB POCT COLOR,UA: YELLOW
SL AMB POCT KETONES,UA: NEGATIVE
SL AMB POCT NITRITE,UA: NEGATIVE
SL AMB POCT PH,UA: 6
SL AMB POCT SPECIFIC GRAVITY,UA: 1.02
SL AMB POCT URINE PROTEIN: NEGATIVE
SL AMB POCT UROBILINOGEN: NEGATIVE

## 2023-05-15 NOTE — PROGRESS NOTES
"Name: Karie Angel      : 1955      MRN: [id number]  Encounter Provider: Maria Luisa Urena MD  Encounter Date: 5/15/2023   Encounter department: [facility]     1  Primary hypertension  Assessment & Plan:  Doing well on current meds    Orders:  -     CBC and differential  -     Comprehensive metabolic panel    2  Tinnitus of both ears  -     Ambulatory Referral to Audiology; Future    3  Pure hypercholesterolemia  Assessment & Plan:  Recent LDL > 150   Diet and exercise   Recheck in 6 months     Orders:  -     Lipid panel    4  Hair loss  -     TSH, 3rd generation with Free T4 reflex; Future    5  Visit for screening mammogram  -     Mammo screening bilateral w 3d & cad; Future; Expected date: 05/15/2023    6  Anxiety  Assessment & Plan: To see Leonid Kendrick for therapy      7  Urinary frequency  Comments:  negative urine dip in the office   will f/u urine culture   Orders:  -     POCT urine dip  -     Urine culture      BMI Counseling: Body mass index is 30 04 kg/m²  The BMI is above normal  Nutrition recommendations include decreasing portion sizes and encouraging healthy choices of fruits and vegetables  Exercise recommendations include moderate physical activity 150 minutes/week  No pharmacotherapy was ordered  Rationale for BMI follow-up plan is due to patient being overweight or obese  Depression Screening and Follow-up Plan: Patient was screened for depression during today's encounter  They screened negative with a PHQ-2 score of 0  Subjective    urinary urgency for the last 3-4 days ago  Feels anxious and worried all the time and more bothersome now   Not suicidal or homicidal   Ringing in both ears mostly in right ear for \"awhile\" about 3 or more years  Hair loss for years   + family h/o hypothyroidism     Hypertension  This is a chronic problem  The current episode started more than 1 year ago  The problem has been gradually improving since onset   The " "problem is controlled  Pertinent negatives include no anxiety, blurred vision, chest pain, headaches, malaise/fatigue, neck pain, orthopnea, palpitations, peripheral edema, PND, shortness of breath or sweats  Past treatments include calcium channel blockers and ACE inhibitors  The current treatment provides mild improvement  There are no compliance problems  There is no history of angina, kidney disease, CAD/MI, CVA, heart failure, left ventricular hypertrophy, PVD or retinopathy  There is no history of chronic renal disease, coarctation of the aorta, hyperaldosteronism, hypercortisolism, hyperparathyroidism, a hypertension causing med, pheochromocytoma, renovascular disease, sleep apnea or a thyroid problem  Review of Systems   Constitutional: Negative  Negative for malaise/fatigue  HENT: Negative  Eyes: Negative for blurred vision  Respiratory: Negative  Negative for shortness of breath  Cardiovascular: Negative  Negative for chest pain, palpitations, orthopnea and PND  Genitourinary: Negative  Musculoskeletal: Negative  Negative for neck pain  Neurological: Negative for headaches  Hematological: Negative  Psychiatric/Behavioral: Negative          Current Outpatient Medications on File Prior to Visit   Medication Sig   • amLODIPine (NORVASC) 2 5 mg tablet TAKE 1 TABLET BY MOUTH EVERY DAY   • Cholecalciferol (VITAMIN D3 PO) Take 5,000 Units by mouth   • MAGNESIUM PO Take by mouth daily   • Multiple Vitamins tablet Take 1 tablet by mouth daily   • olmesartan (BENICAR) 20 mg tablet Take 1 tablet (20 mg total) by mouth daily   • Probiotic Product (UP4 PROBIOTICS ADULT PO) Take by mouth daily     • [DISCONTINUED] desonide (DESOWEN) 0 05 % cream Apply topically 2 (two) times a day       Objective     /86 (BP Location: Left arm, Patient Position: Sitting, Cuff Size: Standard)   Pulse 89   Temp 98 1 °F (36 7 °C) (Tympanic)   Resp 14   Ht 5' 1 73\" (1 568 m)   Wt 73 8 kg (162 lb " 12 8 oz)   SpO2 98%   BMI 30 04 kg/m²     Physical Exam  Vitals and nursing note reviewed  Constitutional:       Appearance: Normal appearance  HENT:      Head: Normocephalic and atraumatic  Right Ear: Tympanic membrane normal       Left Ear: Tympanic membrane normal    Cardiovascular:      Pulses: Normal pulses  Heart sounds: Normal heart sounds  Pulmonary:      Effort: Pulmonary effort is normal       Breath sounds: Normal breath sounds  Neurological:      General: No focal deficit present  Mental Status: She is alert and oriented to person, place, and time         Elinor Salcido MD

## 2023-05-16 LAB — BACTERIA UR CULT: NORMAL

## 2023-05-19 ENCOUNTER — APPOINTMENT (OUTPATIENT)
Dept: LAB | Age: 68
End: 2023-05-19

## 2023-05-19 DIAGNOSIS — L65.9 HAIR LOSS: ICD-10-CM

## 2023-05-19 LAB
ALBUMIN SERPL BCP-MCNC: 4 G/DL (ref 3.5–5)
ALP SERPL-CCNC: 123 U/L (ref 46–116)
ALT SERPL W P-5'-P-CCNC: 36 U/L (ref 12–78)
ANION GAP SERPL CALCULATED.3IONS-SCNC: 6 MMOL/L (ref 4–13)
AST SERPL W P-5'-P-CCNC: 29 U/L (ref 5–45)
BASOPHILS # BLD AUTO: 0.04 THOUSANDS/ÂΜL (ref 0–0.1)
BASOPHILS NFR BLD AUTO: 1 % (ref 0–1)
BILIRUB SERPL-MCNC: 0.43 MG/DL (ref 0.2–1)
BUN SERPL-MCNC: 19 MG/DL (ref 5–25)
CALCIUM SERPL-MCNC: 10 MG/DL (ref 8.3–10.1)
CHLORIDE SERPL-SCNC: 107 MMOL/L (ref 96–108)
CHOLEST SERPL-MCNC: 250 MG/DL
CO2 SERPL-SCNC: 24 MMOL/L (ref 21–32)
CREAT SERPL-MCNC: 0.96 MG/DL (ref 0.6–1.3)
EOSINOPHIL # BLD AUTO: 0.11 THOUSAND/ÂΜL (ref 0–0.61)
EOSINOPHIL NFR BLD AUTO: 2 % (ref 0–6)
ERYTHROCYTE [DISTWIDTH] IN BLOOD BY AUTOMATED COUNT: 12.4 % (ref 11.6–15.1)
GFR SERPL CREATININE-BSD FRML MDRD: 62 ML/MIN/1.73SQ M
GLUCOSE SERPL-MCNC: 75 MG/DL (ref 65–140)
HCT VFR BLD AUTO: 44.9 % (ref 34.8–46.1)
HDLC SERPL-MCNC: 65 MG/DL
HGB BLD-MCNC: 15.2 G/DL (ref 11.5–15.4)
IMM GRANULOCYTES # BLD AUTO: 0.02 THOUSAND/UL (ref 0–0.2)
IMM GRANULOCYTES NFR BLD AUTO: 0 % (ref 0–2)
LDLC SERPL CALC-MCNC: 146 MG/DL (ref 0–100)
LYMPHOCYTES # BLD AUTO: 2.71 THOUSANDS/ÂΜL (ref 0.6–4.47)
LYMPHOCYTES NFR BLD AUTO: 37 % (ref 14–44)
MCH RBC QN AUTO: 30.8 PG (ref 26.8–34.3)
MCHC RBC AUTO-ENTMCNC: 33.9 G/DL (ref 31.4–37.4)
MCV RBC AUTO: 91 FL (ref 82–98)
MONOCYTES # BLD AUTO: 0.57 THOUSAND/ÂΜL (ref 0.17–1.22)
MONOCYTES NFR BLD AUTO: 8 % (ref 4–12)
NEUTROPHILS # BLD AUTO: 3.8 THOUSANDS/ÂΜL (ref 1.85–7.62)
NEUTS SEG NFR BLD AUTO: 52 % (ref 43–75)
NONHDLC SERPL-MCNC: 185 MG/DL
NRBC BLD AUTO-RTO: 0 /100 WBCS
PLATELET # BLD AUTO: 333 THOUSANDS/UL (ref 149–390)
PMV BLD AUTO: 10.7 FL (ref 8.9–12.7)
POTASSIUM SERPL-SCNC: 4 MMOL/L (ref 3.5–5.3)
PROT SERPL-MCNC: 7.4 G/DL (ref 6.4–8.4)
RBC # BLD AUTO: 4.94 MILLION/UL (ref 3.81–5.12)
SODIUM SERPL-SCNC: 137 MMOL/L (ref 135–147)
TRIGL SERPL-MCNC: 197 MG/DL
TSH SERPL DL<=0.05 MIU/L-ACNC: 1.75 UIU/ML (ref 0.45–4.5)
WBC # BLD AUTO: 7.25 THOUSAND/UL (ref 4.31–10.16)

## 2023-06-07 ENCOUNTER — SOCIAL WORK (OUTPATIENT)
Dept: BEHAVIORAL/MENTAL HEALTH CLINIC | Facility: CLINIC | Age: 68
End: 2023-06-07
Payer: COMMERCIAL

## 2023-06-07 DIAGNOSIS — F41.9 ANXIETY: Primary | ICD-10-CM

## 2023-06-07 PROCEDURE — 90834 PSYTX W PT 45 MINUTES: CPT | Performed by: SOCIAL WORKER

## 2023-06-07 NOTE — PSYCH
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 and was not home  Feels his mood has worsened since shelter but that he will seek no help fore these issues  Ramona has prior hx of counseling following her 's infidelity after 20 years of marriage and found it helpful  Has very good support system via family and friends   negative and not appreciative of the good things they have around them   would like;ly benefit from individual counseling along with marital counseling but lik;terry to reject any referral  Focused on managing her reaction to these issues  Denies acute anxiety issues  Denies any acute depressive symptoms  Very invested in her family- children and grandchildren  No SI  During this session, this clinician used the following therapeutic modalities: Solution-Focused Therapy and Supportive Psychotherapy    Substance Abuse was addressed during this session  If the client is diagnosed with a co-occurring substance use disorder, please indicate any changes in the frequency or amount of use: none  Stage of change for addressing substance use diagnoses: No substance use/Not applicable    ASSESSMENT:  Ramona DAMON Griffin presents with a Anxious mood  her affect is Normal range and intensity, which is congruent, with her mood and the content of the session  The client has not made progress on their goals       Ramona Eliana Housere presents with a minimal risk of suicide, minimal risk of self-harm, and minimal risk of harm "to others  For any risk assessment that surpasses a \"low\" rating, a safety plan must be developed  A safety plan was indicated: no  If yes, describe in detail n/a    PLAN: Between sessions, Ramona Jasoney See will look to re-establish involvement in social and recreational outlets away from home to help manage stress  Reviewed boundaries and expectations to maintain with  in an effort to limit stress moving forward  Reviewed relaxation strategies to utilize and she was provided handout on this for her review  Provided my direct contact information and offered additional sessions or referral moving forward  At the next session, the therapist will use Solution-Focused Therapy to address stress/anxiety  Behavioral Health Treatment Plan and Discharge Planning: Malu Anne is aware of and agrees to continue to work on their treatment plan  They have identified and are working toward their discharge goals   no    Visit start and stop times: 9:30-10:20    06/07/23     "

## 2023-08-30 ENCOUNTER — HOSPITAL ENCOUNTER (OUTPATIENT)
Dept: RADIOLOGY | Age: 68
Discharge: HOME | End: 2023-08-30
Payer: COMMERCIAL

## 2023-08-30 VITALS — HEIGHT: 62 IN | BODY MASS INDEX: 29.81 KG/M2 | WEIGHT: 162 LBS

## 2023-08-30 DIAGNOSIS — Z12.31 VISIT FOR SCREENING MAMMOGRAM: ICD-10-CM

## 2023-08-30 PROCEDURE — 77063 BREAST TOMOSYNTHESIS BI: CPT

## 2023-08-30 PROCEDURE — 77067 SCR MAMMO BI INCL CAD: CPT

## 2023-09-09 ENCOUNTER — HOSPITAL ENCOUNTER (EMERGENCY)
Facility: HOSPITAL | Age: 68
Discharge: HOME | End: 2023-09-09
Attending: EMERGENCY MEDICINE
Payer: COMMERCIAL

## 2023-09-09 ENCOUNTER — APPOINTMENT (EMERGENCY)
Dept: RADIOLOGY | Facility: HOSPITAL | Age: 68
End: 2023-09-09
Payer: COMMERCIAL

## 2023-09-09 VITALS
TEMPERATURE: 98.2 F | OXYGEN SATURATION: 98 % | SYSTOLIC BLOOD PRESSURE: 154 MMHG | RESPIRATION RATE: 18 BRPM | HEART RATE: 82 BPM | DIASTOLIC BLOOD PRESSURE: 86 MMHG

## 2023-09-09 DIAGNOSIS — S82.832A CLOSED FRACTURE OF LEFT DISTAL FIBULA: Primary | ICD-10-CM

## 2023-09-09 PROCEDURE — 29515 APPLICATION SHORT LEG SPLINT: CPT | Performed by: EMERGENCY MEDICINE

## 2023-09-09 PROCEDURE — 99284 EMERGENCY DEPT VISIT MOD MDM: CPT | Performed by: EMERGENCY MEDICINE

## 2023-09-09 PROCEDURE — 99283 EMERGENCY DEPT VISIT LOW MDM: CPT

## 2023-09-09 PROCEDURE — 73610 X-RAY EXAM OF ANKLE: CPT

## 2023-09-09 RX ORDER — ACETAMINOPHEN 325 MG/1
975 TABLET ORAL ONCE
Status: COMPLETED | OUTPATIENT
Start: 2023-09-09 | End: 2023-09-09

## 2023-09-09 RX ADMIN — ACETAMINOPHEN 975 MG: 325 TABLET, FILM COATED ORAL at 19:02

## 2023-09-09 NOTE — ED PROVIDER NOTES
History  Chief Complaint   Patient presents with   • Ankle Injury     Pt stepped in hole and twisted L ankle. Pt did fall, pt did not hit head. Patient is a 80-year-old female who presents to the emergency department for evaluation with left ankle pain. She explains that she was walking across grass heading to the mailbox when she unintentionally stepped in a divot. She felt her ankle twist and heard a popping sound and fell forward onto her knees. She was assisted up, applied ice to the area and immediately came here. She was initially able to bear weight on the foot/ankle and was able to do so to get back to her room here but notes that it was with great discomfort. She relates that it feels like the ankle is stiffening up and she is uncertain if she would be able to put weight on it at this time. She denies having any discomfort in the knees or having had any other injury from today. She may have very remotely sprained the left ankle without any residual effect. No paresthesias or numbness in the foot. She does note swelling.  relays "it has blown up."          Prior to Admission Medications   Prescriptions Last Dose Informant Patient Reported? Taking?    Cholecalciferol (VITAMIN D3 PO)  Self Yes No   Sig: Take 5,000 Units by mouth   MAGNESIUM PO   Yes No   Sig: Take by mouth daily   Multiple Vitamins tablet  Self Yes No   Sig: Take 1 tablet by mouth daily   Probiotic Product (UP4 PROBIOTICS ADULT PO)   Yes No   Sig: Take by mouth daily     amLODIPine (NORVASC) 2.5 mg tablet   No No   Sig: TAKE 1 TABLET BY MOUTH EVERY DAY   olmesartan (BENICAR) 20 mg tablet   No No   Sig: Take 1 tablet (20 mg total) by mouth daily      Facility-Administered Medications: None       Past Medical History:   Diagnosis Date   • Allergic 10/82   • Arthritis 12/2014   • Fibroid 1999   • Fibroids     H/O   • HL (hearing loss)    • Hyperlipemia    • Hypertension    • Menometrorrhagia 11/2013    + ENLARGING FIBROIDS + ABNORMAL ENDOMETRIAL COMPLEX   • Miscarriage 1983   • Other specified health status     ERT   • Scoliosis 9/21   • Tinnitus        Past Surgical History:   Procedure Laterality Date   • APPENDECTOMY     • DILATION AND CURETTAGE OF UTERUS  03/2014    EMB, D&C BENIGN ENDOMETRIAL POLYP    • ELBOW SURGERY Left 1969   • ELBOW SURGERY Left    • ENDOMETRIAL BIOPSY  03/2014    EMB, D&C BENIGN ENDOMETRIAL POLYP    • FRACTURE SURGERY  6/1969   • HYSTERECTOMY  10/2014    Total   • TOTAL ABDOMINAL HYSTERECTOMY W/ BILATERAL SALPINGOOPHORECTOMY Left 2002    [facility]   • TUBAL LIGATION     • WISDOM TOOTH EXTRACTION         Family History   Problem Relation Age of Onset   • Heart disease Mother    • Osteoporosis Mother    • Hyperlipidemia Mother    • Thyroid disease Mother    • Arthritis Mother    • Vision loss Mother    • Heart attack Father    • Hyperlipidemia Father    • Heart disease Father    • Hyperlipidemia Sister    • Vision loss Sister    • Skin cancer Sister    • Hyperlipidemia Sister    • Cancer Sister    • Vision loss Sister    • No Known Problems Daughter    • Ovarian cancer Maternal Grandmother 70   • Osteoarthritis Maternal Grandmother    • Arthritis Maternal Grandmother    • Cancer Maternal Grandmother    • No Known Problems Maternal Grandfather    • No Known Problems Paternal Grandmother    • No Known Problems Paternal Grandfather    • Hypertension Brother    • Hyperlipidemia Brother    • Vision loss Brother    • No Known Problems Son    • Skin cancer Maternal Aunt    • Stroke Maternal Aunt    • Hyperlipidemia Maternal Aunt    • Cancer Maternal Aunt    • Hearing loss Maternal Aunt    • No Known Problems Maternal Aunt    • Skin cancer Maternal Aunt    • No Known Problems Paternal Aunt    • No Known Problems Paternal Aunt    • No Known Problems Paternal Aunt    • No Known Problems Paternal Aunt      I have reviewed and agree with the history as documented.     E-Cigarette/Vaping   • E-Cigarette Use Never User E-Cigarette/Vaping Substances   • Nicotine No    • THC No    • CBD No    • Flavoring No    • Other No    • Unknown No      Social History     Tobacco Use   • Smoking status: Never   • Smokeless tobacco: Never   • Tobacco comments:     NO TOBACCO USE   Vaping Use   • Vaping Use: Never used   Substance Use Topics   • Alcohol use: Not Currently     Alcohol/week: 1.0 standard drink of alcohol     Types: 1 Glasses of wine per week   • Drug use: Never       Review of Systems   All other systems reviewed and are negative. Physical Exam  Physical Exam  Vitals and nursing note reviewed. Constitutional:       Appearance: Normal appearance. HENT:      Head: Normocephalic. Cardiovascular:      Rate and Rhythm: Normal rate. Musculoskeletal:      Comments: Patient with moderate swelling and tenderness over and just inferior to the lateral malleolus. Point tenderness is present over the distal fibula. The medial aspect, posterior and anterior aspects of the joint are nontender and without swelling. No discoloration is present. +2 left PT and DP pulses noted. The midfoot including proximal fifth metatarsal is nontender. Good capillary refill and coloring throughout the foot with normal temperature. Able to range toes well. Does dorsi and plantarflex slightly. No tenderness superiorly including of the knee joint. Additional assessment deferred pending radiographs. Skin:     General: Skin is warm and dry. Findings: No rash. Neurological:      Mental Status: She is alert and oriented to person, place, and time.          Vital Signs  ED Triage Vitals   Temperature Pulse Respirations Blood Pressure SpO2   09/09/23 1810 09/09/23 1810 09/09/23 1810 09/09/23 1810 09/09/23 1810   98.2 °F (36.8 °C) 82 18 154/86 98 %      Temp Source Heart Rate Source Patient Position - Orthostatic VS BP Location FiO2 (%)   09/09/23 1810 09/09/23 1810 09/09/23 1810 09/09/23 1810 --   Oral Monitor Lying Right arm       Pain Score       09/09/23 1902       5           Vitals:    09/09/23 1810   BP: 154/86   Pulse: 82   Patient Position - Orthostatic VS: Lying         Visual Acuity      ED Medications  Medications   acetaminophen (TYLENOL) tablet 975 mg (975 mg Oral Given 9/9/23 1902)       Diagnostic Studies  Results Reviewed     None                 XR ankle 3+ views LEFT   ED Interpretation by Missael Obregon MD (09/09 1921)   Distal fibula fracture without displacement      Final Result by Genoveva Navas MD (09/10 1122)      Transverse fracture lateral malleolus. Final report is in agreement with preliminary reading by the ED staff. Workstation performed: [id number]                    Procedures  Splint application    Date/Time: 9/9/2023 7:50 PM    Performed by: Missael Obregon MD  Authorized by: Missael Obregon MD  Universal Protocol:  Procedure performed by: (ED tech)    Pre-procedure details:     Sensation:  Normal  Procedure details:     Laterality:  Left    Location:  Ankle    Ankle:  L ankle    Splint type:  Short leg (Posterior and stirrup components)    Supplies:  Cotton padding, Ortho-Glass and elastic bandage  Post-procedure details:     Pain:  Improved    Sensation:  Normal    Patient tolerance of procedure: Tolerated well, no immediate complications             ED Course  ED Course as of 09/12/23 0051   Sat Sep 09, 2023   1853 Patient tender over her distal lateral malleolus though nowhere else within the joint. Suspect possible distal fibula fracture +/- sprain. Ice remains in place. Discussed medication for analgesia. Patient interested in acetaminophen 975 mg. She notes that around time of hip surgery she was advised to avoid NSAIDs and is uncertain if this recommendation continues. She notes that she does not tolerate opioids well. 1956 Splint in place.                                SBIRT 22yo+    Flowsheet Row Most Recent Value   Initial Alcohol Screen: US AUDIT-C     1. How often do you have a drink containing alcohol? 0 Filed at: 09/09/2023 1905   2. How many drinks containing alcohol do you have on a typical day you are drinking? 0 Filed at: 09/09/2023 1905   3a. Male UNDER 65: How often do you have five or more drinks on one occasion? 0 Filed at: 09/09/2023 1905   3b. FEMALE Any Age, or MALE 65+: How often do you have 4 or more drinks on one occassion? 0 Filed at: 09/09/2023 1905   Audit-C Score 0 Filed at: 09/09/2023 1905   VARINDER: How many times in the past year have you. .. Used an illegal drug or used a prescription medication for non-medical reasons? Never Filed at: 09/09/2023 1905                    MDM    Disposition  Final diagnoses:   Closed fracture of left distal fibula     Time reflects when diagnosis was documented in both MDM as applicable and the Disposition within this note     Time User Action Codes Description Comment    9/9/2023  7:24 PM Blaze Vega Add [E97.011T] Closed fracture of left distal fibula       ED Disposition     ED Disposition   Discharge    Condition   Stable    Date/Time   Sat Sep 9, 2023  7:24 PM    Comment   Sunil Ardon discharge to home/self care.                Follow-up Information     Follow up With Specialties Details Why Contact Info Additional [address] [facility]) Orthopedic Surgery Schedule an appointment as soon as possible for a visit   [address] [place], [place] [id number] [place] [address]), [address] [place] [address]          Discharge Medication List as of 9/9/2023  7:26 PM      CONTINUE these medications which have NOT CHANGED    Details   amLODIPine (NORVASC) 2.5 mg tablet TAKE 1 TABLET BY MOUTH EVERY DAY, Normal      Cholecalciferol (VITAMIN D3 PO) Take 5,000 Units by mouth, Historical Med      MAGNESIUM PO Take by mouth daily, Historical Med      Multiple Vitamins tablet Take 1 tablet by mouth daily, Historical Med      olmesartan (BENICAR) 20 mg tablet Take 1 tablet (20 mg total) by mouth daily, Starting Wed 11/9/2022, Normal      Probiotic Product (UP4 PROBIOTICS ADULT PO) Take by mouth daily  , Historical Med             No discharge procedures on file.     PDMP Review     None          ED Provider  Electronically Signed by           Zach Thrasher MD  09/12/23 8112

## 2023-09-09 NOTE — DISCHARGE INSTRUCTIONS
You were identified to have a fracture of the distal left fibula (ankle). Keep splint dry & in place. Rest & elevate the area. Apply ice for 15 minutes intervals over the next couple of days. You may take acetaminophen as directed on over-the-counter packaging and/or ibuprofen 400 mg every 6 hours as needed for pain. Call the Orthopedics Office on the next business day to schedule an appointment for further re-evaluation/ treatment.

## 2023-10-18 ENCOUNTER — EVALUATION (OUTPATIENT)
Dept: PHYSICAL THERAPY | Facility: CLINIC | Age: 68
End: 2023-10-18
Payer: COMMERCIAL

## 2023-10-18 DIAGNOSIS — M25.572 ACUTE LEFT ANKLE PAIN: Primary | ICD-10-CM

## 2023-10-18 PROCEDURE — 97161 PT EVAL LOW COMPLEX 20 MIN: CPT | Performed by: PHYSICAL THERAPIST

## 2023-10-18 PROCEDURE — 97140 MANUAL THERAPY 1/> REGIONS: CPT | Performed by: PHYSICAL THERAPIST

## 2023-10-18 NOTE — LETTER
2023    Devin Reyna, [facility]    Patient: Salome Lacey   YOB: 1955   Date of Visit: 10/18/2023     Encounter Diagnosis     ICD-10-CM    1. Acute left ankle pain  M25.572           Dear Dr. Melissa Caputo:    Thank you for your recent referral of Salome Lacey. Please review the attached evaluation summary from Yamini's recent visit. Please verify that you agree with the plan of care by signing the attached order. If you have any questions or concerns, please do not hesitate to call. I sincerely appreciate the opportunity to share in the care of one of your patients and hope to have another opportunity to work with you in the near future. Sincerely,    Genoveva Lai, PT      Referring Provider:      I certify that I have read the below Plan of Care and certify the need for these services furnished under this plan of treatment while under my care. Devin Reyna DPM  [facility]  Via Fax: [phone]          PT Evaluation     Today's date: 10/18/2023  Patient name: Salome Lacey  : 1955  MRN: [id number]  Referring provider: Devin Reyna DPM  Dx:   Encounter Diagnosis     ICD-10-CM    1. Acute left ankle pain  M25.572                      Assessment  Assessment details: Pt is a 79 y.o. female who presents to outpatient PT with pain, decreased rom, decreased strength and decreased functional mobility. She will benefit from skilled PT to address these deficits in order to achieve her goals and maximize her functional mobility. Understanding of Dx/Px/POC: good   Prognosis: good    Goals  Short Term Goals: Independent performance of initial hep  Decrease pain 2 points on VAS      Long Term Goals:   Independent performance of comprehensive hep  Work performance is returned to max level of function  Performance of IADL's is returned to max level of function  Performance in recreational activities is improved to max level of function  Able to walk community distances with no AD    Plan  Planned therapy interventions: joint mobilization, kinesiology taping, manual therapy, strengthening, stretching, therapeutic activities, therapeutic exercise, transfer training, therapeutic training, home exercise program, IADL retraining and gait training  Frequency: 2x week  Duration in weeks: 6        Subjective Evaluation    History of Present Illness  Mechanism of injury: 25 rolled her ankle in a divot in the yard and suffered a fx. Reports that she was cleared for [address] [place], [place] [address] this week and was referred to PT with instruction to progress to full indep ambulation over the next 4 weeks. Currently ambulating with RW and aircast. Reports step to gait pattern for stair climbing.       History of L CHI  Patient Goals  Patient goals for therapy: decreased edema, decreased pain, increased motion, increased strength and independence with ADLs/IADLs    Pain  Current pain ratin  At worst pain ratin          Objective      L ankle  ROM:    DF: 0  PF: wfl  INV: 12  EV: 5      STRENGTH:  Df: 4-/5  Pf: 4/5  INV:  4-/5  Ev:  4-/5      Mod edema noted  No sig TTP's  Ambulates with decreased stance time on L           Precautions: L CHI, wean from AD and aircast, WBAT      Manuals 10/18            TC jnt mobs kl            Mid-foot mobilization kl            prom kl                         Neuro Re-Ed                                                                                                        Ther Ex             Ankle circles             Ankle tb x4             Wobble board             Toe curls             Gastroc stretch                                                    Ther Activity             bike                          Gait Training                                       Modalities

## 2023-10-18 NOTE — PROGRESS NOTES
PT Evaluation     Today's date: 10/18/2023  Patient name: Jody Saldana  : 1955  MRN: [id number]  Referring provider: Petra Bishop DPM  Dx:   Encounter Diagnosis     ICD-10-CM    1. Acute left ankle pain  M25.572                      Assessment  Assessment details: Pt is a 79 y.o. female who presents to outpatient PT with pain, decreased rom, decreased strength and decreased functional mobility. She will benefit from skilled PT to address these deficits in order to achieve her goals and maximize her functional mobility. Understanding of Dx/Px/POC: good   Prognosis: good    Goals  Short Term Goals: Independent performance of initial hep  Decrease pain 2 points on VAS      Long Term Goals: Independent performance of comprehensive hep  Work performance is returned to max level of function  Performance of IADL's is returned to max level of function  Performance in recreational activities is improved to max level of function  Able to walk community distances with no AD    Plan  Planned therapy interventions: joint mobilization, kinesiology taping, manual therapy, strengthening, stretching, therapeutic activities, therapeutic exercise, transfer training, therapeutic training, home exercise program, IADL retraining and gait training  Frequency: 2x week  Duration in weeks: 6        Subjective Evaluation    History of Present Illness  Mechanism of injury: 25 rolled her ankle in a divot in the yard and suffered a fx. Reports that she was cleared for [address] [facility] [address] this week and was referred to PT with instruction to progress to full indep ambulation over the next 4 weeks. Currently ambulating with RW and aircast. Reports step to gait pattern for stair climbing.       History of L CHI  Patient Goals  Patient goals for therapy: decreased edema, decreased pain, increased motion, increased strength and independence with ADLs/IADLs    Pain  Current pain ratin  At worst pain rating: 6          Objective      L ankle  ROM:    DF: 0  PF: wfl  INV: 12  EV: 5      STRENGTH:  Df: 4-/5  Pf: 4/5  INV:  4-/5  Ev:  4-/5      Mod edema noted  No sig TTP's  Ambulates with decreased stance time on L           Precautions: L CHI, wean from AD and aircast, WBAT      Manuals 10/18            TC jnt mobs kl            Mid-foot mobilization kl            prom kl                         Neuro Re-Ed                                                                                                        Ther Ex             Ankle circles             Ankle tb x4             Wobble board             Toe curls             Gastroc stretch                                                    Ther Activity             bike                          Gait Training                                       Modalities

## 2023-10-25 ENCOUNTER — OFFICE VISIT (OUTPATIENT)
Dept: PHYSICAL THERAPY | Facility: CLINIC | Age: 68
End: 2023-10-25
Payer: COMMERCIAL

## 2023-10-25 DIAGNOSIS — M25.572 ACUTE LEFT ANKLE PAIN: Primary | ICD-10-CM

## 2023-10-25 PROCEDURE — 97530 THERAPEUTIC ACTIVITIES: CPT | Performed by: PHYSICAL THERAPIST

## 2023-10-25 PROCEDURE — 97110 THERAPEUTIC EXERCISES: CPT | Performed by: PHYSICAL THERAPIST

## 2023-10-25 PROCEDURE — 97140 MANUAL THERAPY 1/> REGIONS: CPT | Performed by: PHYSICAL THERAPIST

## 2023-10-25 NOTE — PROGRESS NOTES
Daily Note     Today's date: 10/25/2023  Patient name: Adam Giron  : 1955  MRN: [id number]  Referring provider: Keiry Velázquez DPM  Dx:   Encounter Diagnosis     ICD-10-CM    1. Acute left ankle pain  M25.572                      Subjective: pt reports compliance with her hep and that she is having no difficulty with it. Reports that she has progressed to using SPC vs. RW for most ambulation and will walking in her home with no AD      Objective: See treatment diary below      Assessment: Initiated tx as listed. Good tolerance to manual tx with improved rom noted post-tx. No sig pain reported t/o tx but requires verbal cues to minimize substitution t/o. Plan: Continue per plan of care.       Precautions: L CHI, wean from AD and aircast, WBAT      Manuals 10/18 10/25           TC jnt mobs kl kl           Mid-foot mobilization kl kl           prom kl kl                        Neuro Re-Ed                                                                                                        Ther Ex             Ankle circles  x20ea           Ankle tb x4  Black x20ea           Wobble board             Toe curls             Gastroc stretch  30"x4           Wobble board  20ea                                     Ther Activity             bike  8' lvl 2                        Gait Training                                       Modalities

## 2023-10-27 ENCOUNTER — OFFICE VISIT (OUTPATIENT)
Dept: PHYSICAL THERAPY | Facility: CLINIC | Age: 68
End: 2023-10-27
Payer: COMMERCIAL

## 2023-10-27 DIAGNOSIS — M25.572 ACUTE LEFT ANKLE PAIN: Primary | ICD-10-CM

## 2023-10-27 PROCEDURE — 97110 THERAPEUTIC EXERCISES: CPT

## 2023-10-27 PROCEDURE — 97530 THERAPEUTIC ACTIVITIES: CPT

## 2023-10-27 NOTE — PROGRESS NOTES
Daily Note     Today's date: 10/27/2023  Patient name: Ilya Andrade  : 1955  MRN: [id number]  Referring provider: Harry Iraheta DPM  Dx:   Encounter Diagnosis     ICD-10-CM    1. Acute left ankle pain  M25.572           Start Time: 1026  Stop Time: 1114  Total time in clinic (min): 48 minutes    Subjective: Patient arrives to clinic denying pain/soreness from last visit. She has weaned herself from using [facility] to no AD and is not using it upon arrival to PT. She also notes house cleaning yesterday which had not reproduced symptoms as well. Objective: See treatment diary below      Assessment: Patient tolerated treatment session well without experiencing any adverse effects. Continued following prescribed POC and progressed program to more strengthening exercises. Issued patient black TB for ankle 4-way exercise to perform on HEP. Most challenged by toga yoga 2* to strength and mobility deficits. Patient left clinic in good condition and would benefit from continued PT for stretching and strengthening to maximize level of function. Plan: Continue per POC. Increase reps/resistance as tolerated.       Precautions: L CHI, wean from AD and aircast, WBAT      Manuals 10/18 10/25 10/27          TC jnt mobs shanice prasad MD          Mid-foot mobilization shanice prasad MD          prom shanice prasad MS                       Neuro Re-Ed                                                                                                        Ther Ex             Ankle circles  x20ea X20 ea          Ankle tb x4  Black x20ea Black x20ea          Wobble board             Toe curls   x20          Gastroc stretch  30"x4 30" x 4          Wobble board  20ea X20 ea          Toe yoga   X10 ea                       Ther Activity             bike  8' lvl 2 10' lvl 2                       Gait Training                                       Modalities

## 2023-10-31 ENCOUNTER — OFFICE VISIT (OUTPATIENT)
Dept: PHYSICAL THERAPY | Facility: CLINIC | Age: 68
End: 2023-10-31
Payer: COMMERCIAL

## 2023-10-31 DIAGNOSIS — M25.572 ACUTE LEFT ANKLE PAIN: Primary | ICD-10-CM

## 2023-10-31 PROCEDURE — 97110 THERAPEUTIC EXERCISES: CPT | Performed by: PHYSICAL THERAPIST

## 2023-10-31 PROCEDURE — 97140 MANUAL THERAPY 1/> REGIONS: CPT | Performed by: PHYSICAL THERAPIST

## 2023-10-31 PROCEDURE — 97530 THERAPEUTIC ACTIVITIES: CPT | Performed by: PHYSICAL THERAPIST

## 2023-10-31 NOTE — PROGRESS NOTES
Daily Note     Today's date: 10/31/2023  Patient name: Paty Graf  : 1955  MRN: [id number]  Referring provider: Dean Posadas DPM  Dx:   Encounter Diagnosis     ICD-10-CM    1. Acute left ankle pain  M25.572                      Subjective: Pt feels she continues to improve. She has progress from aircast to velcro brace and she feels this provides enough stability when walking. Objective: See treatment diary below      Assessment: progressed therex as listed. Pt has difficulty maintaining balance indepen during sls and balance beam but denies any increase in pain. Gerald Prader continues to improved steadily. Continue to progress as mercy NV. Plan: Continue per POC. Increase reps/resistance as tolerated.       Precautions: L CHI, wean from AD and aircast, WBAT      Manuals 10/18 10/25 10/27 10/31         TC jnt mobs shanice prasad MD kl         Mid-foot mobilization kl shanice CLEMENT kl         prom kl kl MS kl                      Neuro Re-Ed                                                                                                        Ther Ex             Ankle circles  x20ea X20 ea x20         Ankle tb x4  Black x20ea Black x20ea X20ea black tb         Wobble board             Toe curls   x20          Gastroc stretch  30"x4 30" x 4 30"x4 off step         Wobble board  20ea X20 ea x30ea         Toe yoga   X10 ea          sls    Airex 30"x3         Tamdem ambulation    Balance beam 6 laps         Step up fwd/lat bosu    nv         Ther Activity             bike  8' lvl 2 10' lvl 2 10' lvl 2                      Gait Training                                       Modalities

## 2023-11-03 ENCOUNTER — OFFICE VISIT (OUTPATIENT)
Dept: PHYSICAL THERAPY | Facility: CLINIC | Age: 68
End: 2023-11-03
Payer: COMMERCIAL

## 2023-11-03 DIAGNOSIS — M25.572 ACUTE LEFT ANKLE PAIN: Primary | ICD-10-CM

## 2023-11-03 PROCEDURE — 97140 MANUAL THERAPY 1/> REGIONS: CPT

## 2023-11-03 PROCEDURE — 97530 THERAPEUTIC ACTIVITIES: CPT

## 2023-11-03 PROCEDURE — 97110 THERAPEUTIC EXERCISES: CPT

## 2023-11-03 NOTE — PROGRESS NOTES
Daily Note     Today's date: 11/3/2023  Patient name: Jody Saldana  : 1955  MRN: [id number]  Referring provider: Royal Yun MD  Dx:   Encounter Diagnosis     ICD-10-CM    1. Acute left ankle pain  M25.572           Start Time: 1100  Stop Time: 1145  Total time in clinic (min): 45 minutes      Subjective: Patient states, "I'm getting there."      Objective: See treatment diary below. Assessment: Progression of therapeutic exercise program is tolerated well. Left ankle inversion and eversion strength is limited. Plan: Continue treatment as per PT plan of care.        Precautions: L CHI, wean from AD and aircast, WBAT      Manuals 10/18 10/25 10/27 10/31 11/3        TC jnt mobs shanice prasad MD kl         Mid-foot mobilization kl kl MD kl         prom kl kl MS kl JLW                                  Neuro Re-Ed                                                                                                        Ther Ex             Ankle circles  x20ea X20 ea x20 20 ea        Ankle tb x4  Black x20ea Black x20ea X20ea black tb black  20 ea                     Toe curls   x20          Gastroc stretch  30"x4 30" x 4 30"x4 off step 30"x4  off step        Wobble board  20ea X20 ea x30ea a/p, m/l  30 ea        Toe yoga   X10 ea          sls    Airex 30"x3 airex  30"x3        tamdem ambulation    Balance beam 6 laps  balance beam  6 laps        step up fwd/lat bosu    nv  20 ea                                  Ther Activity             bike  8' lvl 2 10' lvl 2 10' lvl 2 10'                     Gait Training                                       Modalities

## 2023-11-06 ENCOUNTER — DOCUMENTATION (OUTPATIENT)
Dept: PSYCHIATRY | Facility: CLINIC | Age: 68
End: 2023-11-06

## 2023-11-08 ENCOUNTER — OFFICE VISIT (OUTPATIENT)
Dept: PHYSICAL THERAPY | Facility: CLINIC | Age: 68
End: 2023-11-08
Payer: COMMERCIAL

## 2023-11-08 DIAGNOSIS — M25.572 ACUTE LEFT ANKLE PAIN: Primary | ICD-10-CM

## 2023-11-08 PROCEDURE — 97530 THERAPEUTIC ACTIVITIES: CPT | Performed by: PHYSICAL THERAPIST

## 2023-11-08 PROCEDURE — 97140 MANUAL THERAPY 1/> REGIONS: CPT | Performed by: PHYSICAL THERAPIST

## 2023-11-08 PROCEDURE — 97110 THERAPEUTIC EXERCISES: CPT | Performed by: PHYSICAL THERAPIST

## 2023-11-08 NOTE — PROGRESS NOTES
Daily Note     Today's date: 2023  Patient name: Mary Prado  : 1955  MRN: [id number]  Referring provider: Brenda West MD  Dx:   Encounter Diagnosis     ICD-10-CM    1. Acute left ankle pain  M25.572                        Subjective: Patient reports that she saw her physician and that she is healing well but no completely and that she shoulder continue PT at this time. Objective: See treatment diary below. Assessment:  progressed therex as listed. Ari Issa is progressing nicely. She continues to be challenge maintaining balance in SLS especially on uneven surfaces. Plan to progress as mercy with goal of DC to hep next week if she continues to improve. Instructed pt to add star balance to her hep. Plan: Continue treatment as per PT plan of care.        Precautions: L CHI, wean from AD and aircast, WBAT      Manuals 10/18 10/25 10/27 10/31 11/3 11/8       TC jnt mobs kl kl MD kl  kl       Mid-foot mobilization kl kl MD kl  kl       prom kl kl MS kl JLW kl                                 Neuro Re-Ed                                                                                                        Ther Ex             Ankle circles  x20ea X20 ea x20 20 ea        Ankle tb x4  Black x20ea Black x20ea X20ea black tb black  20 ea Black x30ea                    Toe curls   x20          Gastroc stretch  30"x4 30" x 4 30"x4 off step 30"x4  off step 30"x4       Wobble board  20ea X20 ea x30ea a/p, m/l  30 ea        Toe yoga   X10 ea          sls    Airex 30"x3 airex  30"x3 Airex 30"x3       tamdem ambulation    Balance beam 6 laps  balance beam  6 laps Balance beam 6 laps       step up fwd/lat bosu    nv  20 ea x20ea       Bosu step up with march      x20       Star balance       x6       Ther Activity             bike  8' lvl 2 10' lvl 2 10' lvl 2 10' 10'                    Gait Training                                       Modalities

## 2023-11-10 ENCOUNTER — OFFICE VISIT (OUTPATIENT)
Dept: FAMILY MEDICINE CLINIC | Facility: CLINIC | Age: 68
End: 2023-11-10
Payer: MEDICARE

## 2023-11-10 ENCOUNTER — OFFICE VISIT (OUTPATIENT)
Dept: PHYSICAL THERAPY | Facility: CLINIC | Age: 68
End: 2023-11-10
Payer: COMMERCIAL

## 2023-11-10 VITALS
HEART RATE: 70 BPM | SYSTOLIC BLOOD PRESSURE: 120 MMHG | DIASTOLIC BLOOD PRESSURE: 80 MMHG | HEIGHT: 62 IN | BODY MASS INDEX: 30.47 KG/M2 | WEIGHT: 165.6 LBS | TEMPERATURE: 98.2 F | RESPIRATION RATE: 17 BRPM | OXYGEN SATURATION: 99 %

## 2023-11-10 DIAGNOSIS — Z00.00 MEDICARE ANNUAL WELLNESS VISIT, SUBSEQUENT: Primary | ICD-10-CM

## 2023-11-10 DIAGNOSIS — M85.88 OSTEOPENIA OF LUMBAR SPINE: ICD-10-CM

## 2023-11-10 DIAGNOSIS — I10 PRIMARY HYPERTENSION: ICD-10-CM

## 2023-11-10 DIAGNOSIS — M25.572 ACUTE LEFT ANKLE PAIN: Primary | ICD-10-CM

## 2023-11-10 DIAGNOSIS — E78.00 PURE HYPERCHOLESTEROLEMIA: ICD-10-CM

## 2023-11-10 DIAGNOSIS — Z23 ENCOUNTER FOR IMMUNIZATION: ICD-10-CM

## 2023-11-10 DIAGNOSIS — R32 URINARY INCONTINENCE, UNSPECIFIED TYPE: ICD-10-CM

## 2023-11-10 PROBLEM — M85.80 OSTEOPENIA: Status: ACTIVE | Noted: 2019-05-24

## 2023-11-10 PROCEDURE — 99213 OFFICE O/P EST LOW 20 MIN: CPT | Performed by: FAMILY MEDICINE

## 2023-11-10 PROCEDURE — 97140 MANUAL THERAPY 1/> REGIONS: CPT

## 2023-11-10 PROCEDURE — G0008 ADMIN INFLUENZA VIRUS VAC: HCPCS

## 2023-11-10 PROCEDURE — G0439 PPPS, SUBSEQ VISIT: HCPCS | Performed by: FAMILY MEDICINE

## 2023-11-10 PROCEDURE — 97110 THERAPEUTIC EXERCISES: CPT

## 2023-11-10 PROCEDURE — 90662 IIV NO PRSV INCREASED AG IM: CPT

## 2023-11-10 PROCEDURE — 97530 THERAPEUTIC ACTIVITIES: CPT

## 2023-11-10 NOTE — PROGRESS NOTES
Daily Note     Today's date: 11/10/2023  Patient name: Kathleen Monte  : 1955  MRN: [id number]  Referring provider: Yusef Keyes MD  Dx:   Encounter Diagnosis     ICD-10-CM    1. Acute left ankle pain  M25.572           Start Time: 1319  Stop Time: 1359  Total time in clinic (min): 40 minutes      Subjective: Patient reports she continues to wear the ankle brace for peace of mind when she is "out and about."  Patient reports left ankle pain is not significant. Objective: See treatment diary below. Assessment: Progression of therapeutic exercise program is tolerated well. Left ankle PROM is pain free. Plan: Continue treatment as per PT plan of care.        Precautions: L CHI, wean from AD and Jordan Vora      Manuals 10/18 10/25 10/27 10/31 11/3 11/8 11/10      TC jnt mobs kl kl MD kl  kl KL      Mid-foot mobilization kl kl MD kl  kl KL      prom kl kl MS kl JLW kl JLW                                Neuro Re-Ed                                                                                                        Ther Ex             Ankle circles  x20ea X20 ea x20 20 ea  20 ea      Ankle tb x4  Black x20ea Black x20ea X20ea black tb black  20 ea Black x30ea black  30 ea                   Toe curls   x20          Gastroc stretch  30"x4 30" x 4 30"x4 off step 30"x4  off step 30"x4 30"x4  off step      Wobble board  20ea X20 ea x30ea a/p, m/l  30 ea        Toe yoga   X10 ea          sls    Airex 30"x3 airex  30"x3 Airex 30"x3 airex  30"x3      tamdem ambulation    Balance beam 6 laps  balance beam  6 laps Balance beam 6 laps balance beam  6 laps      step up fwd/lat bosu    nv  20 ea x20ea  lateral step up and over   20      bosu step up with march      x20  20      star balance       x6  6      cone toe taps on airex        20 ea                   Ther Activity             bike  8' lvl 2 10' lvl 2 10' lvl 2 10' 10' 8'                   Gait Training Modalities

## 2023-11-10 NOTE — PATIENT INSTRUCTIONS
Medicare Preventive Visit Patient Instructions  Thank you for completing your Welcome to Medicare Visit or Medicare Annual Wellness Visit today. Your next wellness visit will be due in one year (11/10/2024). The screening/preventive services that you may require over the next 5-10 years are detailed below. Some tests may not apply to you based off risk factors and/or age. Screening tests ordered at today's visit but not completed yet may show as past due. Also, please note that scanned in results may not display below. Preventive Screenings:  Service Recommendations Previous Testing/Comments   Colorectal Cancer Screening  * Colonoscopy    * Fecal Occult Blood Test (FOBT)/Fecal Immunochemical Test (FIT)  * Fecal DNA/Cologuard Test  * Flexible Sigmoidoscopy Age: 43-73 years old   Colonoscopy: every 10 years (may be performed more frequently if at higher risk)  OR  FOBT/FIT: every 1 year  OR  Cologuard: every 3 years  OR  Sigmoidoscopy: every 5 years  Screening may be recommended earlier than age 39 if at higher risk for colorectal cancer. Also, an individualized decision between you and your healthcare provider will decide whether screening between the ages of 77-80 would be appropriate. Colonoscopy: 10/10/2017  FOBT/FIT: Not on file  Cologuard: Not on file  Sigmoidoscopy: Not on file    Screening Current     Breast Cancer Screening Age: 36 years old  Frequency: every 1-2 years  Not required if history of left and right mastectomy Mammogram: 08/30/2023    Screening Current   Cervical Cancer Screening Between the ages of 21-29, pap smear recommended once every 3 years. Between the ages of 32-69, can perform pap smear with HPV co-testing every 5 years.    Recommendations may differ for women with a history of total hysterectomy, cervical cancer, or abnormal pap smears in past. Pap Smear: 04/25/2018    Screening Not Indicated   Hepatitis C Screening Once for adults born between 1945 and 1965  More frequently in patients at high risk for Hepatitis C Hep C Antibody: 11/18/2021    Screening Current   Diabetes Screening 1-2 times per year if you're at risk for diabetes or have pre-diabetes Fasting glucose: 92 mg/dL (5/10/2023)  A1C: No results in last 5 years (No results in last 5 years)  Screening Current   Cholesterol Screening Once every 5 years if you don't have a lipid disorder. May order more often based on risk factors. Lipid panel: 05/19/2023    Screening Not Indicated  History Lipid Disorder     Other Preventive Screenings Covered by Medicare:  Abdominal Aortic Aneurysm (AAA) Screening: covered once if your at risk. You're considered to be at risk if you have a family history of AAA. Lung Cancer Screening: covers low dose CT scan once per year if you meet all of the following conditions: (1) Age 48-67; (2) No signs or symptoms of lung cancer; (3) Current smoker or have quit smoking within the last 15 years; (4) You have a tobacco smoking history of at least 20 pack years (packs per day multiplied by number of years you smoked); (5) You get a written order from a healthcare provider. Glaucoma Screening: covered annually if you're considered high risk: (1) You have diabetes OR (2) Family history of glaucoma OR (3)  aged 48 and older OR (3)  American aged 72 and older  Osteoporosis Screening: covered every 2 years if you meet one of the following conditions: (1) You're estrogen deficient and at risk for osteoporosis based off medical history and other findings; (2) Have a vertebral abnormality; (3) On glucocorticoid therapy for more than 3 months; (4) Have primary hyperparathyroidism; (5) On osteoporosis medications and need to assess response to drug therapy. Last bone density test (DXA Scan): 06/23/2022. HIV Screening: covered annually if you're between the age of 14-79. Also covered annually if you are younger than 13 and older than 72 with risk factors for HIV infection.  For pregnant patients, it is covered up to 3 times per pregnancy. Immunizations:  Immunization Recommendations   Influenza Vaccine Annual influenza vaccination during flu season is recommended for all persons aged >= 6 months who do not have contraindications   Pneumococcal Vaccine   * Pneumococcal conjugate vaccine = PCV13 (Prevnar 13), PCV15 (Vaxneuvance), PCV20 (Prevnar 20)  * Pneumococcal polysaccharide vaccine = PPSV23 (Pneumovax) Adults 08-68 yo with certain risk factors or if 69+ yo  If never received any pneumonia vaccine: recommend Prevnar 20 (PCV20)  Give PCV20 if previously received 1 dose of PCV13 or PPSV23   Hepatitis B Vaccine 3 dose series if at intermediate or high risk (ex: diabetes, end stage renal disease, liver disease)   Respiratory syncytial virus (RSV) Vaccine - COVERED BY MEDICARE PART D  * RSVPreF3 (Arexvy) CDC recommends that adults 61years of age and older may receive a single dose of RSV vaccine using shared clinical decision-making (SCDM)   Tetanus (Td) Vaccine - COST NOT COVERED BY MEDICARE PART B Following completion of primary series, a booster dose should be given every 10 years to maintain immunity against tetanus. Td may also be given as tetanus wound prophylaxis. Tdap Vaccine - COST NOT COVERED BY MEDICARE PART B Recommended at least once for all adults. For pregnant patients, recommended with each pregnancy. Shingles Vaccine (Shingrix) - COST NOT COVERED BY MEDICARE PART B  2 shot series recommended in those 19 years and older who have or will have weakened immune systems or those 50 years and older     Health Maintenance Due:      Topic Date Due   • Breast Cancer Screening: Mammogram  08/30/2025   • Colorectal Cancer Screening  10/10/2027   • Hepatitis C Screening  Completed     Immunizations Due:      Topic Date Due   • COVID-19 Vaccine (5 - Pfizer series) 01/27/2023   • Influenza Vaccine (1) 09/01/2023     Advance Directives   What are advance directives?   Advance directives are legal documents that state your wishes and plans for medical care. These plans are made ahead of time in case you lose your ability to make decisions for yourself. Advance directives can apply to any medical decision, such as the treatments you want, and if you want to donate organs. What are the types of advance directives? There are many types of advance directives, and each state has rules about how to use them. You may choose a combination of any of the following:  Living will: This is a written record of the treatment you want. You can also choose which treatments you do not want, which to limit, and which to stop at a certain time. This includes surgery, medicine, IV fluid, and tube feedings. Durable power of  for [facility]): This is a written record that states who you want to make healthcare choices for you when you are unable to make them for yourself. This person, called a proxy, is usually a family member or a friend. You may choose more than 1 proxy. Do not resuscitate (DNR) order:  A DNR order is used in case your heart stops beating or you stop breathing. It is a request not to have certain forms of treatment, such as CPR. A DNR order may be included in other types of advance directives. Medical directive: This covers the care that you want if you are in a coma, near death, or unable to make decisions for yourself. You can list the treatments you want for each condition. Treatment may include pain medicine, surgery, blood transfusions, dialysis, IV or tube feedings, and a ventilator (breathing machine). Values history: This document has questions about your views, beliefs, and how you feel and think about life. This information can help others choose the care that you would choose. Why are advance directives important? An advance directive helps you control your care. Although spoken wishes may be used, it is better to have your wishes written down.  Spoken wishes can be misunderstood, or not followed. Treatments may be given even if you do not want them. An advance directive may make it easier for your family to make difficult choices about your care. Fall Prevention    Fall prevention  includes ways to make your home and other areas safer. It also includes ways you can move more carefully to prevent a fall. Health conditions that cause changes in your blood pressure, vision, or muscle strength and coordination may increase your risk for falls. Medicines may also increase your risk for falls if they make you dizzy, weak, or sleepy. Fall prevention tips:   Stand or sit up slowly. Use assistive devices as directed. Wear shoes that fit well and have soles that . Wear a personal alarm. Stay active. Manage your medical conditions. Home Safety Tips:  Add items to prevent falls in the bathroom. Keep paths clear. Install bright lights in your home. Keep items you use often on shelves within reach. Paint or place reflective tape on the edges of your stairs. Urinary Incontinence   Urinary incontinence (UI)  is when you lose control of your bladder. UI develops because your bladder cannot store or empty urine properly. The 3 most common types of UI are stress incontinence, urge incontinence, or both. Medicines:   May be given to help strengthen your bladder control. Report any side effects of medication to your healthcare provider. Do pelvic muscle exercises often:  Your pelvic muscles help you stop urinating. Squeeze these muscles tight for 5 seconds, then relax for 5 seconds. Gradually work up to squeezing for 10 seconds. Do 3 sets of 15 repetitions a day, or as directed. This will help strengthen your pelvic muscles and improve bladder control. Train your bladder:  Go to the bathroom at set times, such as every 2 hours, even if you do not feel the urge to go. You can also try to hold your urine when you feel the urge to go.  For example, hold your urine for 5 minutes when you feel the urge to go. As that becomes easier, hold your urine for 10 minutes. Self-care:   Keep a UI record. Write down how often you leak urine and how much you leak. Make a note of what you were doing when you leaked urine. Drink liquids as directed. You may need to limit the amount of liquid you drink to help control your urine leakage. Do not drink any liquid right before you go to bed. Limit or do not have drinks that contain caffeine or alcohol. Prevent constipation. Eat a variety of high-fiber foods. Good examples are high-fiber cereals, beans, vegetables, and whole-grain breads. Walking is the best way to trigger your intestines to have a bowel movement. Exercise regularly and maintain a healthy weight. Weight loss and exercise will decrease pressure on your bladder and help you control your leakage. Use a catheter as directed  to help empty your bladder. A catheter is a tiny, plastic tube that is put into your bladder to drain your urine. Go to behavior therapy as directed. Behavior therapy may be used to help you learn to control your urge to urinate. Weight Management   Why it is important to manage your weight:  Being overweight increases your risk of health conditions such as heart disease, high blood pressure, type 2 diabetes, and certain types of cancer. It can also increase your risk for osteoarthritis, sleep apnea, and other respiratory problems. Aim for a slow, steady weight loss. Even a small amount of weight loss can lower your risk of health problems. How to lose weight safely:  A safe and healthy way to lose weight is to eat fewer calories and get regular exercise. You can lose up about 1 pound a week by decreasing the number of calories you eat by 500 calories each day. Healthy meal plan for weight management:  A healthy meal plan includes a variety of foods, contains fewer calories, and helps you stay healthy.  A healthy meal plan includes the following:  Eat whole-grain foods more often. A healthy meal plan should contain fiber. Fiber is the part of grains, fruits, and vegetables that is not broken down by your body. Whole-grain foods are healthy and provide extra fiber in your diet. Some examples of whole-grain foods are whole-wheat breads and pastas, oatmeal, brown rice, and bulgur. Eat a variety of vegetables every day. Include dark, leafy greens such as spinach, kale, trice greens, and mustard greens. Eat yellow and orange vegetables such as carrots, sweet potatoes, and winter squash. Eat a variety of fruits every day. Choose fresh or canned fruit (canned in its own juice or light syrup) instead of juice. Fruit juice has very little or no fiber. Eat low-fat dairy foods. Drink fat-free (skim) milk or 1% milk. Eat fat-free yogurt and low-fat cottage cheese. Try low-fat cheeses such as mozzarella and other reduced-fat cheeses. Choose meat and other protein foods that are low in fat. Choose beans or other legumes such as split peas or lentils. Choose fish, skinless poultry (chicken or turkey), or lean cuts of red meat (beef or pork). Before you cook meat or poultry, cut off any visible fat. Use less fat and oil. Try baking foods instead of frying them. Add less fat, such as margarine, sour cream, regular salad dressing and mayonnaise to foods. Eat fewer high-fat foods. Some examples of high-fat foods include french fries, doughnuts, ice cream, and cakes. Eat fewer sweets. Limit foods and drinks that are high in sugar. This includes candy, cookies, regular soda, and sweetened drinks. Exercise:  Exercise at least 30 minutes per day on most days of the week. Some examples of exercise include walking, biking, dancing, and swimming. You can also fit in more physical activity by taking the stairs instead of the elevator or parking farther away from stores. Ask your healthcare provider about the best exercise plan for you.       © Copyright [employer] 2018 Information is for [employer] use only and may not be sold, redistributed or otherwise used for commercial purposes. All illustrations and images included in CareNotes® are the copyrighted property of [employer] or [address] [place] [address]      Urinary Incontinence   AMBULATORY CARE:   Urinary incontinence (UI)  is loss of bladder control. UI develops because your bladder cannot store or empty urine properly. The 3 most common types of UI are stress incontinence, urge incontinence, or both. Common symptoms include the following: You feel like your bladder does not empty completely when you urinate. You urinate often and need to urinate immediately. You leak urine when you sleep, or you wake up with the urge to urinate. You leak urine when you cough, sneeze, exercise, or laugh. Seek care immediately if:   You have severe pain. You are confused or cannot think clearly. You see blood in your urine. You have pain when you urinate. You have new or worse pain, even after treatment. Call your doctor if:   You have a fever. Your mouth feels dry or you have vision changes. Your urine is cloudy or smells bad. You have questions or concerns about your condition or care. Treatment  may include any of the following:  Medicines  may be given to help strengthen your bladder control. Electrical stimulation  is used to send a small amount of electrical energy to your pelvic floor muscles. This helps control your bladder function. Electrodes may be placed outside your body or in your rectum. For women, the electrodes may be placed in the vagina. A bulking agent  may be injected into the wall of your urethra to make it thicker. This helps keep your urethra closed and decreases urine leakage. Devices  such as a clamp, pessary, or tampon may help stop urine leaks. Ask your healthcare provider for more information about these and other devices.     Surgery  may be needed if other treatments do not work. Several types of surgery can help improve your bladder control. Ask your provider for more information about the surgery you may need. Self-care:   Keep a UI record. Write down how often you leak urine and how much you leak. Make a note of what you were doing when you leaked urine. Drink liquids as directed. Ask your healthcare provider how much liquid to drink each day and which liquids are best for you. You may need to limit the amount of liquid you drink to help control your urine leakage. Do not drink any liquid right before you go to bed. Limit or do not have drinks that contain caffeine or alcohol. Prevent constipation. Eat a variety of high-fiber foods. Good examples are high-fiber cereals, beans, vegetables, and whole-grain breads. Prune juice may help make your bowel movement softer. Walking is the best way to trigger your intestines to have a bowel movement. Exercise regularly and maintain a healthy weight. Ask your provider how much you should weigh and about the best exercise plan for you. Weight loss and exercise will decrease pressure on your bladder and help you control your leakage. Ask your provider to help you create a weight loss plan, if needed. Use a catheter as directed  to help empty your bladder. A catheter is a tiny, plastic tube that is put into your bladder to drain your urine. Your provider may tell you to use a catheter to prevent your bladder from getting too full and leaking urine. Go to behavior therapy as directed. Behavior therapy may be used to help you learn to control your urge to urinate. Follow up with your doctor as directed:  Write down your questions so you remember to ask them during your visits. © Copyright [employer] 2023 Information is for End User's use only and may not be sold, redistributed or otherwise used for commercial purposes. The above information is an  only.  It is not intended as medical advice for individual conditions or treatments. Talk to your doctor, nurse or pharmacist before following any medical regimen to see if it is safe and effective for you.

## 2023-11-10 NOTE — PROGRESS NOTES
Assessment and Plan:     Problem List Items Addressed This Visit        Cardiovascular and Mediastinum    Hypertension     Stable on current meds         Relevant Orders    CBC and differential    Comprehensive metabolic panel       Musculoskeletal and Integument    Osteopenia     Last dexa scan 2022  Continue diet, exercise , vitamin D and calcium            Other    Pure hypercholesterolemia    Relevant Orders    Lipid panel   Other Visit Diagnoses     Medicare annual wellness visit, subsequent    -  Primary    Encounter for immunization        Relevant Orders    influenza vaccine, high-dose, PF 0.7 mL (FLUZONE HIGH-DOSE) (Completed)    Urinary incontinence, unspecified type                Depression Screening and Follow-up Plan: Patient was screened for depression during today's encounter. They screened negative with a PHQ-2 score of 2. Falls Plan of Care: balance, strength, and gait training instructions were provided and referral to physical therapy. Medications that increase falls were reviewed. Vitamin D supplementation was recommended. Urinary Incontinence Plan of Care: counseling topics discussed: practice Kegel (pelvic floor strengthening) exercises, use restroom every 2 hours, limiting fluid intake 3-4 hours before bed, preventing constipation and taking fluid pills at a time when you can get to bathroom easily. Preventive health issues were discussed with patient, and age appropriate screening tests were ordered as noted in patient's After Visit Summary. Personalized health advice and appropriate referrals for health education or preventive services given if needed, as noted in patient's After Visit Summary.      History of Present Illness:     Patient presents for a Medicare Wellness Visit    HPI     Here for medicare wellness  Twisted her left ankle few months ago   Hard time walking at times       Patient Care Team:  Alex Davalos MD as PCP - General (Family Medicine)     Review of Systems:     Review of Systems   Constitutional: Negative. HENT: Negative. Eyes: Negative. Respiratory: Negative. Cardiovascular: Negative. Gastrointestinal: Negative. Endocrine: Negative. Genitourinary: Negative. Musculoskeletal: Negative. Skin: Negative. Allergic/Immunologic: Negative. Neurological: Negative. Hematological: Negative. Psychiatric/Behavioral: Negative.           Problem List:     Patient Active Problem List   Diagnosis   • Hypertension   • Pure hypercholesterolemia   • Osteopenia   • Anxiety      Past Medical and Surgical History:     Past Medical History:   Diagnosis Date   • Allergic 10/82   • Arthritis 12/2014   • Fibroid 1999   • Fibroids     H/O   • HL (hearing loss)    • Hyperlipemia    • Hypertension    • Menometrorrhagia 11/2013    + ENLARGING FIBROIDS + ABNORMAL ENDOMETRIAL COMPLEX   • Miscarriage 1983   • Other specified health status     ERT   • Scoliosis 9/21   • Tinnitus      Past Surgical History:   Procedure Laterality Date   • APPENDECTOMY     • DILATION AND CURETTAGE OF UTERUS  03/2014    EMB, D&C BENIGN ENDOMETRIAL POLYP    • ELBOW SURGERY Left 1969   • ELBOW SURGERY Left    • ENDOMETRIAL BIOPSY  03/2014    EMB, D&C BENIGN ENDOMETRIAL POLYP    • FRACTURE SURGERY  6/1969   • HYSTERECTOMY  10/2014    Total   • TOTAL ABDOMINAL HYSTERECTOMY W/ BILATERAL SALPINGOOPHORECTOMY Left 2002    [facility]   • TUBAL LIGATION     • WISDOM TOOTH EXTRACTION        Family History:     Family History   Problem Relation Age of Onset   • Heart disease Mother    • Osteoporosis Mother    • Hyperlipidemia Mother    • Thyroid disease Mother    • Arthritis Mother    • Vision loss Mother    • Heart attack Father    • Hyperlipidemia Father    • Heart disease Father    • Hyperlipidemia Sister    • Vision loss Sister    • Skin cancer Sister    • Hyperlipidemia Sister    • Cancer Sister    • Vision loss Sister    • No Known Problems Daughter    • Ovarian cancer Maternal Grandmother 70   • Osteoarthritis Maternal Grandmother    • Arthritis Maternal Grandmother    • Cancer Maternal Grandmother    • No Known Problems Maternal Grandfather    • No Known Problems Paternal Grandmother    • No Known Problems Paternal Grandfather    • Hypertension Brother    • Hyperlipidemia Brother    • Vision loss Brother    • No Known Problems Son    • Skin cancer Maternal Aunt    • Stroke Maternal Aunt    • Hyperlipidemia Maternal Aunt    • Cancer Maternal Aunt    • Hearing loss Maternal Aunt    • No Known Problems Maternal Aunt    • Skin cancer Maternal Aunt    • No Known Problems Paternal Aunt    • No Known Problems Paternal Aunt    • No Known Problems Paternal Aunt    • No Known Problems Paternal Aunt       Social History:     Social History     Socioeconomic History   • Marital status: /Civil Union     Spouse name: None   • Number of children: None   • Years of education: None   • Highest education level: None   Occupational History   • None   Tobacco Use   • Smoking status: Never   • Smokeless tobacco: Never   • Tobacco comments:     NO TOBACCO USE   Vaping Use   • Vaping Use: Never used   Substance and Sexual Activity   • Alcohol use: Not Currently     Alcohol/week: 1.0 standard drink of alcohol     Types: 1 Glasses of wine per week   • Drug use: Never   • Sexual activity: Yes     Partners: Male     Birth control/protection: Post-menopausal, Female Sterilization     Comment: Total hysterectomy 2014   Other Topics Concern   • None   Social History Narrative   • None     Social Determinants of Health     Financial Resource Strain: Low Risk  (11/3/2023)    Overall Financial Resource Strain (CARDIA)    • Difficulty of Paying Living Expenses: Not hard at all   Food Insecurity: Not on file   Transportation Needs: No Transportation Needs (11/3/2023)    PRAPARE - Transportation    • Lack of Transportation (Medical): No    • Lack of Transportation (Non-Medical):  No   Physical Activity: Not on file Stress: Not on file   Social Connections: Not on file   Intimate Partner Violence: Not on file   Housing Stability: Not on file      Medications and Allergies:     Current Outpatient Medications   Medication Sig Dispense Refill   • amLODIPine (NORVASC) 2.5 mg tablet TAKE 1 TABLET BY MOUTH EVERY DAY 90 tablet 3   • Cholecalciferol (VITAMIN D3 PO) Take 1,000 Units by mouth in the morning     • Multiple Vitamins tablet Take 1 tablet by mouth daily     • olmesartan (BENICAR) 20 mg tablet Take 1 tablet (20 mg total) by mouth daily 90 tablet 3   • Probiotic Product (UP4 PROBIOTICS ADULT PO) Take by mouth daily         No current facility-administered medications for this visit. Allergies   Allergen Reactions   • Pedi-Pre Tape Spray [Wound Dressing Adhesive] Rash      Immunizations:     Immunization History   Administered Date(s) Administered   • COVID-19 PFIZER VACCINE 0.3 ML IM 03/30/2021, 04/20/2021, 11/29/2021   • COVID-19 Pfizer Vac BIVALENT Curtis-sucrose 12 Yr+ IM 09/27/2022   • Influenza Quadrivalent 3 years and older 12/11/2018   • Influenza Quadrivalent Recombinant Preservative Free IM 11/19/2019, 11/19/2020   • Influenza, high dose seasonal 0.7 mL 11/09/2022, 11/10/2023   • Influenza, recombinant, quadrivalent,injectable, preservative free 11/08/2021   • Pneumococcal Conjugate Vaccine 20-valent (Pcv20), Polysace 12/01/2022   • Tdap 05/23/2017      Health Maintenance:         Topic Date Due   • Breast Cancer Screening: Mammogram  08/30/2025   • Colorectal Cancer Screening  10/10/2027   • Hepatitis C Screening  Completed         Topic Date Due   • COVID-19 Vaccine (5 - Pfizer series) 01/27/2023      Medicare Screening Tests and Risk Assessments:     Shaneka Rmaan is here for her Subsequent Wellness visit. Last Medicare Wellness visit information reviewed, patient interviewed, no change since last AWV. Health Risk Assessment:   Patient rates overall health as very good.  Patient feels that their physical health rating is same. Patient is satisfied with their life. Eyesight was rated as same. Hearing was rated as same. Patient feels that their emotional and mental health rating is slightly worse. Patients states they are sometimes angry. Patient states they are sometimes unusually tired/fatigued. Pain experienced in the last 7 days has been some. Patient's pain rating has been 1/10. Patient states that she has experienced no weight loss or gain in last 6 months. Depression Screening:   PHQ-2 Score: 2      Fall Risk Screening: In the past year, patient has experienced: history of falling in past year    Injured during fall?: Yes    Feels unsteady when standing or walking?: No    Worried about falling?: No      Urinary Incontinence Screening:   Patient has leaked urine accidently in the last six months. Home Safety:  Patient has trouble with stairs inside or outside of their home. Patient has working smoke alarms and has working carbon monoxide detector. Home safety hazards include: none. Broke left ankle 9/9/2023. Still recovering so stairs are difficult right now. Nutrition:   Current diet is No Added Salt. Medications:   Patient is currently taking over-the-counter supplements. OTC medications include: see medication list. Patient is able to manage medications. Activities of Daily Living (ADLs)/Instrumental Activities of Daily Living (IADLs):   Walk and transfer into and out of bed and chair?: Yes  Dress and groom yourself?: Yes    Bathe or shower yourself?: Yes    Feed yourself?  Yes  Do your laundry/housekeeping?: Yes  Manage your money, pay your bills and track your expenses?: Yes  Make your own meals?: Yes    Do your own shopping?: Yes    Previous Hospitalizations:   Any hospitalizations or ED visits within the last 12 months?: Yes    How many hospitalizations have you had in the last year?: 1-2    Hospitalization Comments: Used [facility] 9/9/2023 for broken ankle    Advance Care Planning: Living will: Yes    Durable POA for healthcare: Yes    Advanced directive: Yes    Advanced directive counseling given: Yes    ACP document given: Yes      PREVENTIVE SCREENINGS      Cardiovascular Screening:    General: Screening Not Indicated and History Lipid Disorder      Diabetes Screening:     General: Screening Current      Colorectal Cancer Screening:     General: Screening Current      Breast Cancer Screening:     General: Screening Current      Cervical Cancer Screening:    General: Screening Not Indicated      Lung Cancer Screening:     General: Screening Not Indicated      Hepatitis C Screening:    General: Screening Current    Screening, Brief Intervention, and Referral to Treatment (SBIRT)    Screening  Typical number of drinks in a day: 0  Typical number of drinks in a week: 1  Interpretation: Low risk drinking behavior. AUDIT-C Screenin) How often did you have a drink containing alcohol in the past year? 2 to 4 times a month  2) How many drinks did you have on a typical day when you were drinking in the past year? 1 to 2  3) How often did you have 6 or more drinks on one occasion in the past year? never    AUDIT-C Score: 2  Interpretation: Score 0-2 (female): Negative screen for alcohol misuse    Single Item Drug Screening:  How often have you used an illegal drug (including marijuana) or a prescription medication for non-medical reasons in the past year? never    Single Item Drug Screen Score: 0  Interpretation: Negative screen for possible drug use disorder    No results found. Physical Exam:     /80 (BP Location: Left arm, Patient Position: Sitting, Cuff Size: Standard)   Pulse 70   Temp 98.2 °F (36.8 °C) (Tympanic)   Resp 17   Ht 5' 1.81" (1.57 m)   Wt 75.1 kg (165 lb 9.6 oz)   SpO2 99%   BMI 30.47 kg/m²     Physical Exam  Vitals and nursing note reviewed. Constitutional:       General: She is not in acute distress. Appearance: Normal appearance.  She is well-developed. HENT:      Head: Normocephalic and atraumatic. Right Ear: Tympanic membrane normal.      Nose: Nose normal.      Mouth/Throat:      Mouth: Mucous membranes are moist.   Eyes:      Conjunctiva/sclera: Conjunctivae normal.   Cardiovascular:      Rate and Rhythm: Normal rate and regular rhythm. Heart sounds: No murmur heard. Pulmonary:      Effort: Pulmonary effort is normal. No respiratory distress. Breath sounds: Normal breath sounds. Abdominal:      Palpations: Abdomen is soft. Tenderness: There is no abdominal tenderness. Musculoskeletal:         General: No swelling. Cervical back: Normal range of motion and neck supple. Comments: Left ankle brace on    Skin:     General: Skin is warm and dry. Capillary Refill: Capillary refill takes less than 2 seconds. Neurological:      General: No focal deficit present. Mental Status: She is alert and oriented to person, place, and time.    Psychiatric:         Mood and Affect: Mood normal.          Asher Castillo MD

## 2023-11-12 DIAGNOSIS — I10 PRIMARY HYPERTENSION: ICD-10-CM

## 2023-11-13 ENCOUNTER — OFFICE VISIT (OUTPATIENT)
Dept: PHYSICAL THERAPY | Facility: CLINIC | Age: 68
End: 2023-11-13
Payer: COMMERCIAL

## 2023-11-13 DIAGNOSIS — M25.572 ACUTE LEFT ANKLE PAIN: Primary | ICD-10-CM

## 2023-11-13 PROCEDURE — 97140 MANUAL THERAPY 1/> REGIONS: CPT

## 2023-11-13 PROCEDURE — 97530 THERAPEUTIC ACTIVITIES: CPT

## 2023-11-13 PROCEDURE — 97110 THERAPEUTIC EXERCISES: CPT

## 2023-11-13 RX ORDER — OLMESARTAN MEDOXOMIL 20 MG/1
20 TABLET ORAL DAILY
Qty: 90 TABLET | Refills: 1 | Status: SHIPPED | OUTPATIENT
Start: 2023-11-13

## 2023-11-13 NOTE — PROGRESS NOTES
Daily Note     Today's date: 2023  Patient name: Mary Prado  : 1955  MRN: [id number]  Referring provider: Brenda West MD  Dx:   Encounter Diagnosis     ICD-10-CM    1. Acute left ankle pain  M25.572                      Subjective: ankle is feeling better, pain around the achilles is resolving. Still wearing her brace. Objective: See treatment diary below      Assessment: Tolerated treatment fair, some apprehension noted throughout session. Minimal UE support required for balance tasks. Ramona continues to benefit from manuals to promote ankle mobility. Tactile cuing during t-band ankle PREs to avoid compensatory patterns, fair eccentric control. Continued PT would be beneficial to improve function. Plan: Continue per plan of care.        Precautions: DAMON MIRELES, wean from AD and Reford July      Manuals 10/18 10/25 10/27 10/31 11/3 11/8 11/10 11/13     TC jnt mobs kl kl MD kl  kl KL KL     Mid-foot mobilization kl kl MD kl  kl KL KL     prom kl kl MS kl JLW kl JLW MB                               Neuro Re-Ed                                                                                                        Ther Ex             Ankle circles  x20ea X20 ea x20 20 ea  20 ea 20 ea     Ankle tb x4  Black x20ea Black x20ea X20ea black tb black  20 ea Black x30ea black  30 ea black  30 ea                  Toe curls   x20          Gastroc stretch  30"x4 30" x 4 30"x4 off step 30"x4  off step 30"x4 30"x4  off step 30"x4  off step     Wobble board  20ea X20 ea x30ea a/p, m/l  30 ea        Toe yoga   X10 ea          sls    Airex 30"x3 airex  30"x3 Airex 30"x3 airex  30"x3 airex  30"x3     tamdem ambulation    Balance beam 6 laps  balance beam  6 laps Balance beam 6 laps balance beam  6 laps balance beam  6 laps     step up fwd/lat bosu    nv  20 ea x20ea  lateral step up and over   20 lateral step up and over   20     bosu step up with march      x2  20 20x     star balance       x6  6 X6, stance leg on 2" step     cone toe taps on airex        20 ea 20 ea                  Ther Activity             bike  8' lvl 2 10' lvl 2 10' lvl 2 10' 10' 8' 8'                  Gait Training                                       Modalities

## 2023-11-16 ENCOUNTER — OFFICE VISIT (OUTPATIENT)
Dept: PHYSICAL THERAPY | Facility: CLINIC | Age: 68
End: 2023-11-16
Payer: COMMERCIAL

## 2023-11-16 DIAGNOSIS — M25.572 ACUTE LEFT ANKLE PAIN: Primary | ICD-10-CM

## 2023-11-16 PROCEDURE — 97110 THERAPEUTIC EXERCISES: CPT | Performed by: PHYSICAL THERAPIST

## 2023-11-16 PROCEDURE — 97140 MANUAL THERAPY 1/> REGIONS: CPT | Performed by: PHYSICAL THERAPIST

## 2023-11-16 PROCEDURE — 97530 THERAPEUTIC ACTIVITIES: CPT | Performed by: PHYSICAL THERAPIST

## 2023-11-16 NOTE — PROGRESS NOTES
Daily Note     Today's date: 2023  Patient name: Shellie Roy  : 1955  MRN: [id number]  Referring provider: Renata Le MD  Dx:   Encounter Diagnosis     ICD-10-CM    1. Acute left ankle pain  M25.572                      Subjective: pt reports overall improvements since starting PT. Reports that she will continue to wear her brace on uneven surfaces and that she feels stiff when descending stairs. Reports compliance with her hep. Objective: See treatment diary below    L ankle    ROM:    DF: 6  PF: wfl  INV: 29  EV: 9      STRENGTH:  Df: 4+/5  Pf: 4+/5  INV:  4/5  Ev:  4-5    Min edema noted      Assessment: Pt has achieved a majority of her goals set at the start of therapy, is independent with her hep, and will be Dc'd at this time. She is instructed to call the clinic if she has question with her hep or if symptoms return.   Thank you for this referral.          Plan: DC to HEP       Precautions: DAMON MIRELES, wean from AD and aircast, [facility]      Manuals 10/18 10/25 10/27 10/31 11/3 11/8 11/10 11/13 11/16    TC jnt mobs shanice prasad MD kl  kl KL KL kl    Mid-foot mobilization kl kl MD kl  kl KL KL kl    prom kl kl MS kl JLW kl JLW MB kl                              Neuro Re-Ed                                                                                                        Ther Ex             Ankle circles  x20ea X20 ea x20 20 ea  20 ea 20 ea x20    Ankle tb x4  Black x20ea Black x20ea X20ea black tb black  20 ea Black x30ea black  30 ea black  30 ea Green x30                 Toe curls   x20          Gastroc stretch  30"x4 30" x 4 30"x4 off step 30"x4  off step 30"x4 30"x4  off step 30"x4  off step 30"x4    Wobble board  20ea X20 ea x30ea a/p, m/l  30 ea        Toe yoga   X10 ea          sls    Airex 30"x3 airex  30"x3 Airex 30"x3 airex  30"x3 airex  30"x3 Airex 30"x4    tamdem ambulation    Balance beam 6 laps  balance beam  6 laps Balance beam 6 laps balance beam  6 laps balance beam  6 laps step up fwd/lat bosu    nv  20 ea x20ea  lateral step up and over   20 lateral step up and over   20     bosu step up with march x20  20 20x     star balance       x6  6 X6, stance leg on 2" step x6    cone toe taps on airex        20 ea 20 ea                  Ther Activity             bike  8' lvl 2 10' lvl 2 10' lvl 2 10' 10' 8' 8' 8'                 Gait Training                                       Modalities

## 2024-03-14 DIAGNOSIS — I10 PRIMARY HYPERTENSION: ICD-10-CM

## 2024-03-14 RX ORDER — AMLODIPINE BESYLATE 2.5 MG/1
TABLET ORAL
Qty: 90 TABLET | Refills: 1 | Status: SHIPPED | OUTPATIENT
Start: 2024-03-14

## 2024-03-19 ENCOUNTER — OFFICE VISIT (OUTPATIENT)
Dept: FAMILY MEDICINE CLINIC | Facility: CLINIC | Age: 69
End: 2024-03-19
Payer: MEDICARE

## 2024-03-19 VITALS
RESPIRATION RATE: 16 BRPM | SYSTOLIC BLOOD PRESSURE: 120 MMHG | TEMPERATURE: 96.7 F | HEART RATE: 82 BPM | BODY MASS INDEX: 30.73 KG/M2 | OXYGEN SATURATION: 95 % | DIASTOLIC BLOOD PRESSURE: 80 MMHG | WEIGHT: 167 LBS | HEIGHT: 62 IN

## 2024-03-19 DIAGNOSIS — I10 PRIMARY HYPERTENSION: ICD-10-CM

## 2024-03-19 DIAGNOSIS — M26.622 ARTHRALGIA OF LEFT TEMPOROMANDIBULAR JOINT: Primary | ICD-10-CM

## 2024-03-19 PROCEDURE — G2211 COMPLEX E/M VISIT ADD ON: HCPCS | Performed by: FAMILY MEDICINE

## 2024-03-19 PROCEDURE — 99213 OFFICE O/P EST LOW 20 MIN: CPT | Performed by: FAMILY MEDICINE

## 2024-03-19 RX ORDER — IBUPROFEN 600 MG/1
600 TABLET ORAL EVERY 6 HOURS PRN
Qty: 30 TABLET | Refills: 0 | Status: SHIPPED | OUTPATIENT
Start: 2024-03-19

## 2024-03-19 NOTE — PROGRESS NOTES
Name: Yamini Ovalles      : 1955      MRN: [id number]  Encounter Provider: Mine Phelan MD  Encounter Date: 3/19/2024   Encounter department: [facility]    Assessment & Plan     1. Arthralgia of left temporomandibular joint  Comments:  warm compresses  Orders:  -     ibuprofen (MOTRIN) 600 mg tablet; Take 1 tablet (600 mg total) by mouth every 6 (six) hours as needed for mild pain    2. Primary hypertension  Assessment & Plan:  Stable on current meds             Subjective   Left salivary gland area swelling for the last 3 days    Earache   There is pain in the left ear. This is a new problem. Episode onset: 3 days ago. The problem occurs constantly. The problem has been unchanged. There has been no fever. The pain is at a severity of 1/10. The pain is mild. Associated symptoms include hearing loss and neck pain. Pertinent negatives include no abdominal pain, coughing, diarrhea, ear discharge, headaches, rash, rhinorrhea, sore throat or vomiting. There is no history of a chronic ear infection, hearing loss or a tympanostomy tube.     Review of Systems   HENT:  Positive for ear pain and hearing loss. Negative for ear discharge, rhinorrhea and sore throat.    Respiratory:  Negative for cough.    Gastrointestinal:  Negative for abdominal pain, diarrhea and vomiting.   Musculoskeletal:  Positive for neck pain.   Skin:  Negative for rash.   Neurological:  Negative for headaches.       Past Medical History:   Diagnosis Date   • Allergic 10/82   • Arthritis 2014   • Fibroid    • Fibroids     H/O   • HL (hearing loss)    • Hyperlipemia    • Hypertension    • Menometrorrhagia 2013    + ENLARGING FIBROIDS + ABNORMAL ENDOMETRIAL COMPLEX   • Miscarriage    • Other specified health status     ERT   • Scoliosis    • Tinnitus      Past Surgical History:   Procedure Laterality Date   • APPENDECTOMY     • DILATION AND CURETTAGE OF UTERUS  2014    EMB, D&C BENIGN ENDOMETRIAL  POLYP    • ELBOW SURGERY Left 1969   • ELBOW SURGERY Left    • ENDOMETRIAL BIOPSY  03/2014    EMB, D&C BENIGN ENDOMETRIAL POLYP    • FRACTURE SURGERY  6/1969   • HYSTERECTOMY  10/2014    Total   • TOTAL ABDOMINAL HYSTERECTOMY W/ BILATERAL SALPINGOOPHORECTOMY Left 2002    [facility] BSO   • TUBAL LIGATION     • WISDOM TOOTH EXTRACTION       Family History   Problem Relation Age of Onset   • Breast cancer Mother    • Heart disease Mother    • Osteoporosis Mother    • Hyperlipidemia Mother    • Thyroid disease Mother    • Arthritis Mother    • Vision loss Mother    • Heart attack Father    • Hyperlipidemia Father    • Heart disease Father    • Hyperlipidemia Sister    • Vision loss Sister    • Skin cancer Sister    • Hyperlipidemia Sister    • Cancer Sister    • Vision loss Sister    • Hypertension Brother    • Hyperlipidemia Brother    • Vision loss Brother    • No Known Problems Son    • No Known Problems Daughter    • Ovarian cancer Maternal Grandmother 71   • Osteoarthritis Maternal Grandmother    • Arthritis Maternal Grandmother    • Cancer Maternal Grandmother    • No Known Problems Maternal Grandfather    • No Known Problems Paternal Grandmother    • No Known Problems Paternal Grandfather    • Skin cancer Maternal Aunt    • Stroke Maternal Aunt    • Hyperlipidemia Maternal Aunt    • Cancer Maternal Aunt    • Hearing loss Maternal Aunt    • No Known Problems Maternal Aunt    • Skin cancer Maternal Aunt    • No Known Problems Paternal Aunt    • No Known Problems Paternal Aunt    • No Known Problems Paternal Aunt    • No Known Problems Paternal Aunt      Social History     Socioeconomic History   • Marital status: /Civil Union     Spouse name: None   • Number of children: None   • Years of education: None   • Highest education level: None   Occupational History   • None   Tobacco Use   • Smoking status: Never   • Smokeless tobacco: Never   • Tobacco comments:     NO TOBACCO USE   Vaping Use   • Vaping status: Never  Used   Substance and Sexual Activity   • Alcohol use: Not Currently     Alcohol/week: 1.0 standard drink of alcohol     Types: 1 Glasses of wine per week   • Drug use: Never   • Sexual activity: Yes     Partners: Male     Birth control/protection: Post-menopausal, Female Sterilization     Comment: Total hysterectomy 2014   Other Topics Concern   • None   Social History Narrative   • None     Social Determinants of Health     Financial Resource Strain: Low Risk  (11/3/2023)    Overall Financial Resource Strain (CARDIA)    • Difficulty of Paying Living Expenses: Not hard at all   Food Insecurity: Not on file   Transportation Needs: No Transportation Needs (11/3/2023)    PRAPARE - Transportation    • Lack of Transportation (Medical): No    • Lack of Transportation (Non-Medical): No   Physical Activity: Not on file   Stress: Not on file   Social Connections: Not on file   Intimate Partner Violence: Not on file   Housing Stability: Not on file     Current Outpatient Medications on File Prior to Visit   Medication Sig   • amLODIPine (NORVASC) 2.5 mg tablet TAKE 1 TABLET BY MOUTH EVERY DAY   • Multiple Vitamins tablet Take 1 tablet by mouth daily   • olmesartan (BENICAR) 20 mg tablet TAKE 1 TABLET BY MOUTH EVERY DAY   • Probiotic Product (UP4 PROBIOTICS ADULT PO) Take by mouth daily     • [DISCONTINUED] Cholecalciferol (VITAMIN D3 PO) Take 1,000 Units by mouth in the morning     Allergies   Allergen Reactions   • Pedi-Pre Tape Spray [Wound Dressing Adhesive] Rash     Immunization History   Administered Date(s) Administered   • COVID-19 PFIZER VACCINE 0.3 ML IM 03/30/2021, 04/20/2021, 11/29/2021   • COVID-19 Pfizer Vac BIVALENT Curtis-sucrose 12 Yr+ IM 09/27/2022   • COVID-19 Pfizer mRNA vacc PF curtis-sucrose 12 yr and older (Comirnaty) 11/03/2023   • COVID-19 Pfizer vac (Curtis-sucrose, gray cap) 12 yr+ IM 06/02/2022   • Influenza Quadrivalent 3 years and older 12/11/2018   • Influenza Quadrivalent Recombinant Preservative Free  "IM 11/19/2019, 11/19/2020   • Influenza, high dose seasonal 0.7 mL 11/09/2022, 11/10/2023   • Influenza, recombinant, quadrivalent,injectable, preservative free 11/08/2021   • Pneumococcal Conjugate Vaccine 20-valent (Pcv20), Polysace 12/01/2022   • Respiratory Syncytial Virus Vaccine (Recombinant, Adjuvanted) 11/03/2023   • Tdap 05/23/2017       Objective     /80 (BP Location: Left arm, Patient Position: Sitting, Cuff Size: Standard)   Pulse 82   Temp (!) 96.7 °F (35.9 °C) (Tympanic)   Resp 16   Ht 5' 1.81\" (1.57 m)   Wt 75.8 kg (167 lb)   SpO2 95%   BMI 30.73 kg/m²     Physical Exam  Constitutional:       Appearance: Normal appearance.   Cardiovascular:      Rate and Rhythm: Normal rate and regular rhythm.      Pulses: Normal pulses.      Heart sounds: Normal heart sounds.   Pulmonary:      Effort: Pulmonary effort is normal.      Breath sounds: Normal breath sounds.   Musculoskeletal:         General: Tenderness present. No swelling.      Comments: Left TMJ   Neurological:      General: No focal deficit present.      Mental Status: She is alert and oriented to person, place, and time.       Mine Phelan MD    "

## 2024-05-10 ENCOUNTER — APPOINTMENT (OUTPATIENT)
Dept: LAB | Age: 69
End: 2024-05-10
Payer: MEDICARE

## 2024-05-10 LAB
ALBUMIN SERPL BCP-MCNC: 4.5 G/DL (ref 3.5–5)
ALP SERPL-CCNC: 110 U/L (ref 34–104)
ALT SERPL W P-5'-P-CCNC: 34 U/L (ref 7–52)
ANION GAP SERPL CALCULATED.3IONS-SCNC: 7 MMOL/L (ref 4–13)
AST SERPL W P-5'-P-CCNC: 28 U/L (ref 13–39)
BASOPHILS # BLD AUTO: 0.05 THOUSANDS/ÂΜL (ref 0–0.1)
BASOPHILS NFR BLD AUTO: 1 % (ref 0–1)
BILIRUB SERPL-MCNC: 0.8 MG/DL (ref 0.2–1)
BUN SERPL-MCNC: 18 MG/DL (ref 5–25)
CALCIUM SERPL-MCNC: 9.7 MG/DL (ref 8.4–10.2)
CHLORIDE SERPL-SCNC: 101 MMOL/L (ref 96–108)
CHOLEST SERPL-MCNC: 257 MG/DL
CO2 SERPL-SCNC: 31 MMOL/L (ref 21–32)
CREAT SERPL-MCNC: 1.01 MG/DL (ref 0.6–1.3)
EOSINOPHIL # BLD AUTO: 0.13 THOUSAND/ÂΜL (ref 0–0.61)
EOSINOPHIL NFR BLD AUTO: 2 % (ref 0–6)
ERYTHROCYTE [DISTWIDTH] IN BLOOD BY AUTOMATED COUNT: 12.2 % (ref 11.6–15.1)
GFR SERPL CREATININE-BSD FRML MDRD: 58 ML/MIN/1.73SQ M
GLUCOSE P FAST SERPL-MCNC: 96 MG/DL (ref 65–99)
HCT VFR BLD AUTO: 46.9 % (ref 34.8–46.1)
HDLC SERPL-MCNC: 58 MG/DL
HGB BLD-MCNC: 15.8 G/DL (ref 11.5–15.4)
IMM GRANULOCYTES # BLD AUTO: 0.01 THOUSAND/UL (ref 0–0.2)
IMM GRANULOCYTES NFR BLD AUTO: 0 % (ref 0–2)
LDLC SERPL CALC-MCNC: 163 MG/DL (ref 0–100)
LYMPHOCYTES # BLD AUTO: 2.34 THOUSANDS/ÂΜL (ref 0.6–4.47)
LYMPHOCYTES NFR BLD AUTO: 42 % (ref 14–44)
MCH RBC QN AUTO: 30.5 PG (ref 26.8–34.3)
MCHC RBC AUTO-ENTMCNC: 33.7 G/DL (ref 31.4–37.4)
MCV RBC AUTO: 91 FL (ref 82–98)
MONOCYTES # BLD AUTO: 0.51 THOUSAND/ÂΜL (ref 0.17–1.22)
MONOCYTES NFR BLD AUTO: 9 % (ref 4–12)
NEUTROPHILS # BLD AUTO: 2.49 THOUSANDS/ÂΜL (ref 1.85–7.62)
NEUTS SEG NFR BLD AUTO: 46 % (ref 43–75)
NONHDLC SERPL-MCNC: 199 MG/DL
NRBC BLD AUTO-RTO: 0 /100 WBCS
PLATELET # BLD AUTO: 312 THOUSANDS/UL (ref 149–390)
PMV BLD AUTO: 9.3 FL (ref 8.9–12.7)
POTASSIUM SERPL-SCNC: 4.2 MMOL/L (ref 3.5–5.3)
PROT SERPL-MCNC: 7.3 G/DL (ref 6.4–8.4)
RBC # BLD AUTO: 5.18 MILLION/UL (ref 3.81–5.12)
SODIUM SERPL-SCNC: 139 MMOL/L (ref 135–147)
TRIGL SERPL-MCNC: 178 MG/DL
WBC # BLD AUTO: 5.53 THOUSAND/UL (ref 4.31–10.16)

## 2024-05-12 DIAGNOSIS — I10 PRIMARY HYPERTENSION: ICD-10-CM

## 2024-05-13 ENCOUNTER — OFFICE VISIT (OUTPATIENT)
Dept: FAMILY MEDICINE CLINIC | Facility: CLINIC | Age: 69
End: 2024-05-13
Payer: MEDICARE

## 2024-05-13 VITALS
BODY MASS INDEX: 30.8 KG/M2 | HEIGHT: 62 IN | HEART RATE: 83 BPM | OXYGEN SATURATION: 97 % | WEIGHT: 167.4 LBS | DIASTOLIC BLOOD PRESSURE: 78 MMHG | SYSTOLIC BLOOD PRESSURE: 120 MMHG | RESPIRATION RATE: 17 BRPM | TEMPERATURE: 97.6 F

## 2024-05-13 DIAGNOSIS — I10 PRIMARY HYPERTENSION: ICD-10-CM

## 2024-05-13 DIAGNOSIS — M85.80 OSTEOPENIA, UNSPECIFIED LOCATION: ICD-10-CM

## 2024-05-13 DIAGNOSIS — F41.9 ANXIETY: ICD-10-CM

## 2024-05-13 DIAGNOSIS — E78.00 PURE HYPERCHOLESTEROLEMIA: Primary | ICD-10-CM

## 2024-05-13 DIAGNOSIS — Z78.0 ASYMPTOMATIC MENOPAUSAL STATE: ICD-10-CM

## 2024-05-13 PROCEDURE — G2211 COMPLEX E/M VISIT ADD ON: HCPCS | Performed by: FAMILY MEDICINE

## 2024-05-13 PROCEDURE — 99214 OFFICE O/P EST MOD 30 MIN: CPT | Performed by: FAMILY MEDICINE

## 2024-05-13 NOTE — PROGRESS NOTES
Name: Yamini Ovalles      : 1955      MRN: [id number]  Encounter Provider: Mine Phelan MD  Encounter Date: 2024   Encounter department: [facility]    Assessment & Plan     1. Pure hypercholesterolemia  Assessment & Plan:  Diet ,exercise and portion control   To cut back on sweets and fatty food    Orders:  -     Comprehensive metabolic panel  -     CBC and differential  -     Lipid panel    2. Primary hypertension  Assessment & Plan:  Doing well on current meds    Orders:  -     Comprehensive metabolic panel  -     CBC and differential  -     Lipid panel    3. Osteopenia, unspecified location  Assessment & Plan:   Low bone mass (OSTEOPENIA). [Based on the lumbar spine]  When compared to the prior examination, there has been a 6  % DECREASE in the total bone mineral density of the lumbar spine.  Due for dexa scan       Orders:  -     DXA bone density spine hip and pelvis; Future; Expected date: 2024    4. Asymptomatic menopausal state  -     DXA bone density spine hip and pelvis; Future; Expected date: 2024    5. Anxiety  Assessment & Plan:  Stable   Coping well  Continue to monitor              Subjective     HPI  Here for follow up  Feels well overall  A lot of stress and Busy with taking care of family members    Review of Systems   Constitutional: Negative.    HENT: Negative.     Eyes: Negative.    Respiratory: Negative.     Cardiovascular: Negative.    Gastrointestinal: Negative.    Endocrine: Negative.    Genitourinary: Negative.    Musculoskeletal: Negative.    Skin: Negative.    Allergic/Immunologic: Negative.    Neurological: Negative.  Negative for dizziness.   Hematological: Negative.    Psychiatric/Behavioral:  The patient is nervous/anxious.        Past Medical History:   Diagnosis Date   • Allergic 10/82   • Arthritis 2014   • Fibroid    • Fibroids     H/O   • HL (hearing loss)    • Hyperlipemia    • Hypertension    • Menometrorrhagia 2013     + ENLARGING FIBROIDS + ABNORMAL ENDOMETRIAL COMPLEX   • Miscarriage 1983   • Other specified health status     ERT   • Scoliosis 9/21   • Tinnitus      Past Surgical History:   Procedure Laterality Date   • APPENDECTOMY     • DILATION AND CURETTAGE OF UTERUS  03/2014    EMB, D&C BENIGN ENDOMETRIAL POLYP    • ELBOW SURGERY Left 1969   • ELBOW SURGERY Left    • ENDOMETRIAL BIOPSY  03/2014    EMB, D&C BENIGN ENDOMETRIAL POLYP    • FRACTURE SURGERY  6/1969   • HYSTERECTOMY  10/2014    Total   • TOTAL ABDOMINAL HYSTERECTOMY W/ BILATERAL SALPINGOOPHORECTOMY Left 2002    [facility] BSO   • TUBAL LIGATION     • WISDOM TOOTH EXTRACTION       Family History   Problem Relation Age of Onset   • Breast cancer Mother    • Heart disease Mother    • Osteoporosis Mother    • Hyperlipidemia Mother    • Thyroid disease Mother    • Arthritis Mother    • Vision loss Mother    • Heart attack Father    • Hyperlipidemia Father    • Heart disease Father    • Hyperlipidemia Sister    • Vision loss Sister    • Skin cancer Sister    • Hyperlipidemia Sister    • Cancer Sister    • Vision loss Sister    • Hypertension Brother    • Hyperlipidemia Brother    • Vision loss Brother    • No Known Problems Son    • No Known Problems Daughter    • Ovarian cancer Maternal Grandmother 71   • Osteoarthritis Maternal Grandmother    • Arthritis Maternal Grandmother    • Cancer Maternal Grandmother    • No Known Problems Maternal Grandfather    • No Known Problems Paternal Grandmother    • No Known Problems Paternal Grandfather    • Skin cancer Maternal Aunt    • Stroke Maternal Aunt    • Hyperlipidemia Maternal Aunt    • Cancer Maternal Aunt    • Hearing loss Maternal Aunt    • No Known Problems Maternal Aunt    • Skin cancer Maternal Aunt    • No Known Problems Paternal Aunt    • No Known Problems Paternal Aunt    • No Known Problems Paternal Aunt    • No Known Problems Paternal Aunt      Social History     Socioeconomic History   • Marital status: /Civil  Union     Spouse name: None   • Number of children: None   • Years of education: None   • Highest education level: None   Occupational History   • None   Tobacco Use   • Smoking status: Never   • Smokeless tobacco: Never   • Tobacco comments:     NO TOBACCO USE   Vaping Use   • Vaping status: Never Used   Substance and Sexual Activity   • Alcohol use: Not Currently     Alcohol/week: 1.0 standard drink of alcohol     Types: 1 Glasses of wine per week   • Drug use: Never   • Sexual activity: Yes     Partners: Male     Birth control/protection: Post-menopausal, Female Sterilization     Comment: Total hysterectomy 2014   Other Topics Concern   • None   Social History Narrative   • None     Social Determinants of Health     Financial Resource Strain: Low Risk  (11/3/2023)    Overall Financial Resource Strain (CARDIA)    • Difficulty of Paying Living Expenses: Not hard at all   Food Insecurity: Not on file   Transportation Needs: No Transportation Needs (11/3/2023)    PRAPARE - Transportation    • Lack of Transportation (Medical): No    • Lack of Transportation (Non-Medical): No   Physical Activity: Not on file   Stress: Not on file   Social Connections: Not on file   Intimate Partner Violence: Not on file   Housing Stability: Not on file     Current Outpatient Medications on File Prior to Visit   Medication Sig   • amLODIPine (NORVASC) 2.5 mg tablet TAKE 1 TABLET BY MOUTH EVERY DAY   • Multiple Vitamins tablet Take 1 tablet by mouth daily   • olmesartan (BENICAR) 20 mg tablet TAKE 1 TABLET BY MOUTH EVERY DAY   • Probiotic Product (UP4 PROBIOTICS ADULT PO) Take by mouth daily     • [DISCONTINUED] ibuprofen (MOTRIN) 600 mg tablet Take 1 tablet (600 mg total) by mouth every 6 (six) hours as needed for mild pain     Allergies   Allergen Reactions   • Pedi-Pre Tape Spray [Wound Dressing Adhesive] Rash     Immunization History   Administered Date(s) Administered   • COVID-19 PFIZER VACCINE 0.3 ML IM 03/30/2021, 04/20/2021,  "11/29/2021   • COVID-19 Pfizer Vac BIVALENT Curtis-sucrose 12 Yr+ IM 09/27/2022   • COVID-19 Pfizer mRNA vacc PF curtis-sucrose 12 yr and older (Comirnaty) 11/03/2023   • COVID-19 Pfizer vac (Curtis-sucrose, gray cap) 12 yr+ IM 06/02/2022   • Influenza Quadrivalent 3 years and older 12/11/2018   • Influenza Quadrivalent Recombinant Preservative Free IM 11/19/2019, 11/19/2020   • Influenza, high dose seasonal 0.7 mL 11/09/2022, 11/10/2023   • Influenza, recombinant, quadrivalent,injectable, preservative free 11/08/2021   • Pneumococcal Conjugate Vaccine 20-valent (Pcv20), Polysace 12/01/2022   • Respiratory Syncytial Virus Vaccine (Recombinant, Adjuvanted) 11/03/2023   • Tdap 05/23/2017       Objective     /78 (BP Location: Left arm, Patient Position: Sitting, Cuff Size: Standard)   Pulse 83   Temp 97.6 °F (36.4 °C) (Tympanic)   Resp 17   Ht 5' 1.81\" (1.57 m)   Wt 75.9 kg (167 lb 6.4 oz)   SpO2 97%   BMI 30.81 kg/m²     Physical Exam  Vitals and nursing note reviewed.   Constitutional:       Appearance: Normal appearance.   HENT:      Right Ear: Tympanic membrane normal.      Left Ear: Tympanic membrane normal.      Nose: Nose normal.      Mouth/Throat:      Mouth: Mucous membranes are moist.   Cardiovascular:      Rate and Rhythm: Normal rate and regular rhythm.      Pulses: Normal pulses.      Heart sounds: Normal heart sounds.   Pulmonary:      Effort: Pulmonary effort is normal.   Skin:     Capillary Refill: Capillary refill takes less than 2 seconds.   Neurological:      General: No focal deficit present.      Mental Status: She is alert and oriented to person, place, and time.   Psychiatric:         Mood and Affect: Mood normal.         Behavior: Behavior normal.       Mine Phelan MD    "

## 2024-05-13 NOTE — ASSESSMENT & PLAN NOTE
Low bone mass (OSTEOPENIA). [Based on the lumbar spine]  When compared to the prior examination, there has been a 6  % DECREASE in the total bone mineral density of the lumbar spine.  Due for dexa scan

## 2024-05-14 RX ORDER — OLMESARTAN MEDOXOMIL 20 MG/1
20 TABLET ORAL DAILY
Qty: 90 TABLET | Refills: 1 | Status: SHIPPED | OUTPATIENT
Start: 2024-05-14

## 2024-08-29 ENCOUNTER — HOSPITAL ENCOUNTER (OUTPATIENT)
Dept: RADIOLOGY | Age: 69
Discharge: HOME | End: 2024-08-29
Payer: MEDICARE

## 2024-08-29 DIAGNOSIS — M85.80 OSTEOPENIA, UNSPECIFIED LOCATION: ICD-10-CM

## 2024-08-29 DIAGNOSIS — Z78.0 ASYMPTOMATIC MENOPAUSAL STATE: ICD-10-CM

## 2024-08-29 PROCEDURE — 77080 DXA BONE DENSITY AXIAL: CPT

## 2024-09-03 ENCOUNTER — HOSPITAL ENCOUNTER (OUTPATIENT)
Dept: RADIOLOGY | Age: 69
Discharge: HOME | End: 2024-09-03
Payer: MEDICARE

## 2024-09-03 VITALS — BODY MASS INDEX: 30.73 KG/M2 | WEIGHT: 167 LBS | HEIGHT: 62 IN

## 2024-09-03 DIAGNOSIS — Z12.31 VISIT FOR SCREENING MAMMOGRAM: ICD-10-CM

## 2024-09-03 PROCEDURE — 77067 SCR MAMMO BI INCL CAD: CPT

## 2024-09-03 PROCEDURE — 77063 BREAST TOMOSYNTHESIS BI: CPT

## 2024-09-06 DIAGNOSIS — I10 PRIMARY HYPERTENSION: ICD-10-CM

## 2024-09-06 RX ORDER — AMLODIPINE BESYLATE 2.5 MG/1
TABLET ORAL
Qty: 90 TABLET | Refills: 1 | Status: SHIPPED | OUTPATIENT
Start: 2024-09-06

## 2024-11-07 ENCOUNTER — APPOINTMENT (OUTPATIENT)
Dept: LAB | Facility: CLINIC | Age: 69
End: 2024-11-07
Payer: MEDICARE

## 2024-11-07 LAB
ALBUMIN SERPL BCG-MCNC: 4.6 G/DL (ref 3.5–5)
ALP SERPL-CCNC: 106 U/L (ref 34–104)
ALT SERPL W P-5'-P-CCNC: 37 U/L (ref 7–52)
ANION GAP SERPL CALCULATED.3IONS-SCNC: 10 MMOL/L (ref 4–13)
AST SERPL W P-5'-P-CCNC: 37 U/L (ref 13–39)
BASOPHILS # BLD AUTO: 0.03 THOUSANDS/ΜL (ref 0–0.1)
BASOPHILS NFR BLD AUTO: 1 % (ref 0–1)
BILIRUB SERPL-MCNC: 1.07 MG/DL (ref 0.2–1)
BUN SERPL-MCNC: 14 MG/DL (ref 5–25)
CALCIUM SERPL-MCNC: 9.7 MG/DL (ref 8.4–10.2)
CHLORIDE SERPL-SCNC: 101 MMOL/L (ref 96–108)
CHOLEST SERPL-MCNC: 271 MG/DL
CO2 SERPL-SCNC: 27 MMOL/L (ref 21–32)
CREAT SERPL-MCNC: 1.05 MG/DL (ref 0.6–1.3)
EOSINOPHIL # BLD AUTO: 0.13 THOUSAND/ΜL (ref 0–0.61)
EOSINOPHIL NFR BLD AUTO: 3 % (ref 0–6)
ERYTHROCYTE [DISTWIDTH] IN BLOOD BY AUTOMATED COUNT: 12.2 % (ref 11.6–15.1)
GFR SERPL CREATININE-BSD FRML MDRD: 55 ML/MIN/1.73SQ M
GLUCOSE P FAST SERPL-MCNC: 104 MG/DL (ref 65–99)
HCT VFR BLD AUTO: 46.5 % (ref 34.8–46.1)
HDLC SERPL-MCNC: 56 MG/DL
HGB BLD-MCNC: 15.4 G/DL (ref 11.5–15.4)
IMM GRANULOCYTES # BLD AUTO: 0.02 THOUSAND/UL (ref 0–0.2)
IMM GRANULOCYTES NFR BLD AUTO: 0 % (ref 0–2)
LDLC SERPL CALC-MCNC: 183 MG/DL (ref 0–100)
LYMPHOCYTES # BLD AUTO: 2.11 THOUSANDS/ΜL (ref 0.6–4.47)
LYMPHOCYTES NFR BLD AUTO: 42 % (ref 14–44)
MCH RBC QN AUTO: 30 PG (ref 26.8–34.3)
MCHC RBC AUTO-ENTMCNC: 33.1 G/DL (ref 31.4–37.4)
MCV RBC AUTO: 91 FL (ref 82–98)
MONOCYTES # BLD AUTO: 0.4 THOUSAND/ΜL (ref 0.17–1.22)
MONOCYTES NFR BLD AUTO: 8 % (ref 4–12)
NEUTROPHILS # BLD AUTO: 2.35 THOUSANDS/ΜL (ref 1.85–7.62)
NEUTS SEG NFR BLD AUTO: 46 % (ref 43–75)
NONHDLC SERPL-MCNC: 215 MG/DL
NRBC BLD AUTO-RTO: 0 /100 WBCS
PLATELET # BLD AUTO: 279 THOUSANDS/UL (ref 149–390)
PMV BLD AUTO: 9.8 FL (ref 8.9–12.7)
POTASSIUM SERPL-SCNC: 4.5 MMOL/L (ref 3.5–5.3)
PROT SERPL-MCNC: 7.4 G/DL (ref 6.4–8.4)
RBC # BLD AUTO: 5.13 MILLION/UL (ref 3.81–5.12)
SODIUM SERPL-SCNC: 138 MMOL/L (ref 135–147)
TRIGL SERPL-MCNC: 160 MG/DL
WBC # BLD AUTO: 5.04 THOUSAND/UL (ref 4.31–10.16)

## 2024-11-09 DIAGNOSIS — I10 PRIMARY HYPERTENSION: ICD-10-CM

## 2024-11-11 RX ORDER — OLMESARTAN MEDOXOMIL 20 MG/1
20 TABLET ORAL DAILY
Qty: 90 TABLET | Refills: 1 | Status: SHIPPED | OUTPATIENT
Start: 2024-11-11

## 2024-11-15 ENCOUNTER — OFFICE VISIT (OUTPATIENT)
Dept: FAMILY MEDICINE CLINIC | Facility: CLINIC | Age: 69
End: 2024-11-15
Payer: MEDICARE

## 2024-11-15 VITALS
WEIGHT: 167 LBS | BODY MASS INDEX: 30.73 KG/M2 | OXYGEN SATURATION: 98 % | TEMPERATURE: 97.6 F | DIASTOLIC BLOOD PRESSURE: 80 MMHG | HEART RATE: 76 BPM | RESPIRATION RATE: 17 BRPM | HEIGHT: 62 IN | SYSTOLIC BLOOD PRESSURE: 120 MMHG

## 2024-11-15 DIAGNOSIS — Z00.00 MEDICARE ANNUAL WELLNESS VISIT, SUBSEQUENT: Primary | ICD-10-CM

## 2024-11-15 DIAGNOSIS — E78.00 PURE HYPERCHOLESTEROLEMIA: ICD-10-CM

## 2024-11-15 DIAGNOSIS — I10 PRIMARY HYPERTENSION: ICD-10-CM

## 2024-11-15 DIAGNOSIS — N39.3 FEMALE STRESS INCONTINENCE: ICD-10-CM

## 2024-11-15 DIAGNOSIS — R73.03 PREDIABETES: ICD-10-CM

## 2024-11-15 PROCEDURE — G0439 PPPS, SUBSEQ VISIT: HCPCS | Performed by: FAMILY MEDICINE

## 2024-11-15 PROCEDURE — 99214 OFFICE O/P EST MOD 30 MIN: CPT | Performed by: FAMILY MEDICINE

## 2024-11-15 RX ORDER — ROSUVASTATIN CALCIUM 10 MG/1
10 TABLET, COATED ORAL DAILY
Qty: 100 TABLET | Refills: 3 | Status: SHIPPED | OUTPATIENT
Start: 2024-11-15

## 2024-11-15 NOTE — ASSESSMENT & PLAN NOTE
Orders:    CBC and differential    Comprehensive metabolic panel    Lipid panel    rosuvastatin (CRESTOR) 10 MG tablet; Take 1 tablet (10 mg total) by mouth daily

## 2024-11-15 NOTE — PATIENT INSTRUCTIONS
Medicare Preventive Visit Patient Instructions  Thank you for completing your Welcome to Medicare Visit or Medicare Annual Wellness Visit today. Your next wellness visit will be due in one year (11/16/2025).  The screening/preventive services that you may require over the next 5-10 years are detailed below. Some tests may not apply to you based off risk factors and/or age. Screening tests ordered at today's visit but not completed yet may show as past due. Also, please note that scanned in results may not display below.  Preventive Screenings:  Service Recommendations Previous Testing/Comments   Colorectal Cancer Screening  * Colonoscopy    * Fecal Occult Blood Test (FOBT)/Fecal Immunochemical Test (FIT)  * Fecal DNA/Cologuard Test  * Flexible Sigmoidoscopy Age: 45-75 years old   Colonoscopy: every 10 years (may be performed more frequently if at higher risk)  OR  FOBT/FIT: every 1 year  OR  Cologuard: every 3 years  OR  Sigmoidoscopy: every 5 years  Screening may be recommended earlier than age 45 if at higher risk for colorectal cancer. Also, an individualized decision between you and your healthcare provider will decide whether screening between the ages of 76-85 would be appropriate. Colonoscopy: 07/12/2024  FOBT/FIT: Not on file  Cologuard: Not on file  Sigmoidoscopy: Not on file    Screening Current     Breast Cancer Screening Age: 40+ years old  Frequency: every 1-2 years  Not required if history of left and right mastectomy Mammogram: 09/03/2024    Screening Current   Cervical Cancer Screening Between the ages of 21-29, pap smear recommended once every 3 years.   Between the ages of 30-65, can perform pap smear with HPV co-testing every 5 years.   Recommendations may differ for women with a history of total hysterectomy, cervical cancer, or abnormal pap smears in past. Pap Smear: 04/25/2018    Screening Not Indicated   Hepatitis C Screening Once for adults born between 1945 and 1965  More frequently in  patients at high risk for Hepatitis C Hep C Antibody: 11/18/2021    Screening Current   Diabetes Screening 1-2 times per year if you're at risk for diabetes or have pre-diabetes Fasting glucose: 104 mg/dL (11/7/2024)  A1C: No results in last 5 years (No results in last 5 years)  Screening Current   Cholesterol Screening Once every 5 years if you don't have a lipid disorder. May order more often based on risk factors. Lipid panel: 11/07/2024    Screening Not Indicated  History Lipid Disorder     Other Preventive Screenings Covered by Medicare:  Abdominal Aortic Aneurysm (AAA) Screening: covered once if your at risk. You're considered to be at risk if you have a family history of AAA.  Lung Cancer Screening: covers low dose CT scan once per year if you meet all of the following conditions: (1) Age 55-77; (2) No signs or symptoms of lung cancer; (3) Current smoker or have quit smoking within the last 15 years; (4) You have a tobacco smoking history of at least 20 pack years (packs per day multiplied by number of years you smoked); (5) You get a written order from a healthcare provider.  Glaucoma Screening: covered annually if you're considered high risk: (1) You have diabetes OR (2) Family history of glaucoma OR (3)  aged 50 and older OR (4)  American aged 65 and older  Osteoporosis Screening: covered every 2 years if you meet one of the following conditions: (1) You're estrogen deficient and at risk for osteoporosis based off medical history and other findings; (2) Have a vertebral abnormality; (3) On glucocorticoid therapy for more than 3 months; (4) Have primary hyperparathyroidism; (5) On osteoporosis medications and need to assess response to drug therapy.   Last bone density test (DXA Scan): 08/29/2024.  HIV Screening: covered annually if you're between the age of 15-65. Also covered annually if you are younger than 15 and older than 65 with risk factors for HIV infection. For pregnant  patients, it is covered up to 3 times per pregnancy.    Immunizations:  Immunization Recommendations   Influenza Vaccine Annual influenza vaccination during flu season is recommended for all persons aged >= 6 months who do not have contraindications   Pneumococcal Vaccine   * Pneumococcal conjugate vaccine = PCV13 (Prevnar 13), PCV15 (Vaxneuvance), PCV20 (Prevnar 20)  * Pneumococcal polysaccharide vaccine = PPSV23 (Pneumovax) Adults 19-65 yo with certain risk factors or if 65+ yo  If never received any pneumonia vaccine: recommend Prevnar 20 (PCV20)  Give PCV20 if previously received 1 dose of PCV13 or PPSV23   Hepatitis B Vaccine 3 dose series if at intermediate or high risk (ex: diabetes, end stage renal disease, liver disease)   Respiratory syncytial virus (RSV) Vaccine - COVERED BY MEDICARE PART D  * RSVPreF3 (Arexvy) CDC recommends that adults 60 years of age and older may receive a single dose of RSV vaccine using shared clinical decision-making (SCDM)   Tetanus (Td) Vaccine - COST NOT COVERED BY MEDICARE PART B Following completion of primary series, a booster dose should be given every 10 years to maintain immunity against tetanus. Td may also be given as tetanus wound prophylaxis.   Tdap Vaccine - COST NOT COVERED BY MEDICARE PART B Recommended at least once for all adults. For pregnant patients, recommended with each pregnancy.   Shingles Vaccine (Shingrix) - COST NOT COVERED BY MEDICARE PART B  2 shot series recommended in those 19 years and older who have or will have weakened immune systems or those 50 years and older     Health Maintenance Due:      Topic Date Due   • Breast Cancer Screening: Mammogram  09/03/2026   • Colorectal Cancer Screening  10/10/2027   • Hepatitis C Screening  Completed     Immunizations Due:  There are no preventive care reminders to display for this patient.  Advance Directives   What are advance directives?  Advance directives are legal documents that state your wishes and  plans for medical care. These plans are made ahead of time in case you lose your ability to make decisions for yourself. Advance directives can apply to any medical decision, such as the treatments you want, and if you want to donate organs.   What are the types of advance directives?  There are many types of advance directives, and each state has rules about how to use them. You may choose a combination of any of the following:  Living will:  This is a written record of the treatment you want. You can also choose which treatments you do not want, which to limit, and which to stop at a certain time. This includes surgery, medicine, IV fluid, and tube feedings.   Durable power of  for healthcare (DPAHC):  This is a written record that states who you want to make healthcare choices for you when you are unable to make them for yourself. This person, called a proxy, is usually a family member or a friend. You may choose more than 1 proxy.  Do not resuscitate (DNR) order:  A DNR order is used in case your heart stops beating or you stop breathing. It is a request not to have certain forms of treatment, such as CPR. A DNR order may be included in other types of advance directives.  Medical directive:  This covers the care that you want if you are in a coma, near death, or unable to make decisions for yourself. You can list the treatments you want for each condition. Treatment may include pain medicine, surgery, blood transfusions, dialysis, IV or tube feedings, and a ventilator (breathing machine).  Values history:  This document has questions about your views, beliefs, and how you feel and think about life. This information can help others choose the care that you would choose.  Why are advance directives important?  An advance directive helps you control your care. Although spoken wishes may be used, it is better to have your wishes written down. Spoken wishes can be misunderstood, or not followed. Treatments  may be given even if you do not want them. An advance directive may make it easier for your family to make difficult choices about your care.   Urinary Incontinence   Urinary incontinence (UI)  is when you lose control of your bladder. UI develops because your bladder cannot store or empty urine properly. The 3 most common types of UI are stress incontinence, urge incontinence, or both.  Medicines:   May be given to help strengthen your bladder control. Report any side effects of medication to your healthcare provider.  Do pelvic muscle exercises often:  Your pelvic muscles help you stop urinating. Squeeze these muscles tight for 5 seconds, then relax for 5 seconds. Gradually work up to squeezing for 10 seconds. Do 3 sets of 15 repetitions a day, or as directed. This will help strengthen your pelvic muscles and improve bladder control.  Train your bladder:  Go to the bathroom at set times, such as every 2 hours, even if you do not feel the urge to go. You can also try to hold your urine when you feel the urge to go. For example, hold your urine for 5 minutes when you feel the urge to go. As that becomes easier, hold your urine for 10 minutes.   Self-care:   Keep a UI record.  Write down how often you leak urine and how much you leak. Make a note of what you were doing when you leaked urine.  Drink liquids as directed. You may need to limit the amount of liquid you drink to help control your urine leakage. Do not drink any liquid right before you go to bed. Limit or do not have drinks that contain caffeine or alcohol.   Prevent constipation.  Eat a variety of high-fiber foods. Good examples are high-fiber cereals, beans, vegetables, and whole-grain breads. Walking is the best way to trigger your intestines to have a bowel movement.  Exercise regularly and maintain a healthy weight.  Weight loss and exercise will decrease pressure on your bladder and help you control your leakage.   Use a catheter as directed  to help  empty your bladder. A catheter is a tiny, plastic tube that is put into your bladder to drain your urine.   Go to behavior therapy as directed.  Behavior therapy may be used to help you learn to control your urge to urinate.    Weight Management   Why it is important to manage your weight:  Being overweight increases your risk of health conditions such as heart disease, high blood pressure, type 2 diabetes, and certain types of cancer. It can also increase your risk for osteoarthritis, sleep apnea, and other respiratory problems. Aim for a slow, steady weight loss. Even a small amount of weight loss can lower your risk of health problems.  How to lose weight safely:  A safe and healthy way to lose weight is to eat fewer calories and get regular exercise. You can lose up about 1 pound a week by decreasing the number of calories you eat by 500 calories each day.   Healthy meal plan for weight management:  A healthy meal plan includes a variety of foods, contains fewer calories, and helps you stay healthy. A healthy meal plan includes the following:  Eat whole-grain foods more often.  A healthy meal plan should contain fiber. Fiber is the part of grains, fruits, and vegetables that is not broken down by your body. Whole-grain foods are healthy and provide extra fiber in your diet. Some examples of whole-grain foods are whole-wheat breads and pastas, oatmeal, brown rice, and bulgur.  Eat a variety of vegetables every day.  Include dark, leafy greens such as spinach, kale, trice greens, and mustard greens. Eat yellow and orange vegetables such as carrots, sweet potatoes, and winter squash.   Eat a variety of fruits every day.  Choose fresh or canned fruit (canned in its own juice or light syrup) instead of juice. Fruit juice has very little or no fiber.  Eat low-fat dairy foods.  Drink fat-free (skim) milk or 1% milk. Eat fat-free yogurt and low-fat cottage cheese. Try low-fat cheeses such as mozzarella and other  "reduced-fat cheeses.  Choose meat and other protein foods that are low in fat.  Choose beans or other legumes such as split peas or lentils. Choose fish, skinless poultry (chicken or turkey), or lean cuts of red meat (beef or pork). Before you cook meat or poultry, cut off any visible fat.   Use less fat and oil.  Try baking foods instead of frying them. Add less fat, such as margarine, sour cream, regular salad dressing and mayonnaise to foods. Eat fewer high-fat foods. Some examples of high-fat foods include french fries, doughnuts, ice cream, and cakes.  Eat fewer sweets.  Limit foods and drinks that are high in sugar. This includes candy, cookies, regular soda, and sweetened drinks.  Exercise:  Exercise at least 30 minutes per day on most days of the week. Some examples of exercise include walking, biking, dancing, and swimming. You can also fit in more physical activity by taking the stairs instead of the elevator or parking farther away from stores. Ask your healthcare provider about the best exercise plan for you.      © Copyright [employer] 2018 Information is for End User's use only and may not be sold, redistributed or otherwise used for commercial purposes. All illustrations and images included in CareNotes® are the copyrighted property of [employer] Inc. or [employer]      Patient Education     Urinary incontinence in females   The Basics   Written by the doctors and editors at [facility], [place]   What is urinary incontinence? -- Urinary incontinence is the medical term for when a person leaks urine or loses bladder control.  Incontinence is a very common problem, but it is not a normal part of aging. If you have this problem, there are treatments that can help. There are also things that you can do on your own to stop or reduce urine leakage so you don't have to \"just live with it.\"  What are the symptoms of incontinence? -- There are different types of incontinence. Each causes different symptoms. " "The 3 most common types are:   Stress incontinence - With stress incontinence, you leak urine when you laugh, cough, sneeze, or do anything that \"stresses\" the belly. Stress incontinence is most common in females, especially those who have had a baby.   Urgency incontinence - With urgency incontinence, you feel a strong need to urinate all of a sudden. This is also known as \"urge incontinence.\" Often, the \"urge\" is so strong that you can't make it to the bathroom in time. \"Overactive bladder\" is another term for having a sudden, frequent urge to urinate. People with overactive bladder might or might not actually leak urine.   Mixed incontinence - With mixed incontinence, you have symptoms of both stress and urgency incontinence.  Is there anything I can do on my own to feel better? -- Yes. Here are some things that can help reduce urine leaks:   Reduce the amount of liquid that you drink, especially a few hours before bed.   Cut down on any foods or drinks that make your symptoms worse. Some people find that alcohol, caffeine, or spicy or acidic foods irritate the bladder.   Try to lose weight, if you are overweight. Your doctor or nurse can help you do this in a healthy way.   If you have diabetes, keep your blood sugar as close to your goal level as possible.   If you take medicines called diuretics, plan ahead. These medicines increase the need to urinate. Try to take them when you know you will be near a bathroom for a few hours. If you keep having problems with leakage because of diuretics, ask your doctor if you can take a lower dose or switch to a different medicine.  These techniques can also help improve bladder control:   Bladder retraining - During bladder retraining, you go to the bathroom at scheduled times. For instance, you might decide that you will go every hour. Make yourself go every hour, even if you don't feel like you need to. Try to wait the whole hour, even if you need to go sooner. Then, " "once you get used to going every hour, increase the amount of time you wait in between bathroom visits. Over time, you might be able to \"retrain\" your bladder to wait 3 or 4 hours between bathroom visits.   Pelvic floor muscle training - This involves learning exercises to strengthen and relax your pelvic muscles. These include the muscles that control the flow of urine and bowel movements. When done right, these exercises can help. But people often do them wrong. Ask your doctor or nurse how to do them right. Your doctor might suggest working with a physical therapist who has special training in these exercises.  Should I see my doctor or nurse? -- Yes. Your doctor or nurse can find out what might be causing your incontinence. They can also suggest ways to relieve the problem.  When you speak to your doctor or nurse, ask if any of the medicines you take could be causing your symptoms. Some medicines can cause incontinence or make it worse.  Some people choose to wear pads or special underwear. These can help if you accidentally leak urine once in a while. But they can also cause skin irritation if you use them a lot. If you have incontinence, ask your doctor or nurse how to treat it.  How is incontinence treated? -- The treatment options differ depending on what type of incontinence you have. Some of the options include:   Medicines to relax the bladder   Surgery to repair the tissues that support the bladder or to improve the flow of urine   Electrical stimulation of the nerves that relax the bladder  Urinary incontinence is more common in people who have been through menopause. (Menopause is when you stop having monthly periods). Some people have vaginal dryness after menopause. If this is the case for you, a treatment called vaginal estrogen might help.  What will my life be like? -- Many people with incontinence can regain bladder control or at least reduce the amount of leakage they have. The most important " thing is to tell your doctor or nurse. Then, work with them to find an approach that helps you.  All topics are updated as new evidence becomes available and our peer review process is complete.  This topic retrieved from [employer] on: Feb 26, 2024.  Topic 43409 Version 18.0  Release: 32.2.4 - C32.56  © 2024 UpToDate, Inc. and/or its affiliates. All rights reserved.  figure 1: Location of the bladder     This drawing shows the side view of a woman's body. The bladder is in front of the vagina. The urethra is the tube that carries urine from the bladder out of the body.  Graphic 769823 Version 1.0  Consumer Information Use and Disclaimer   Disclaimer: This generalized information is a limited summary of diagnosis, treatment, and/or medication information. It is not meant to be comprehensive and should be used as a tool to help the user understand and/or assess potential diagnostic and treatment options. It does NOT include all information about conditions, treatments, medications, side effects, or risks that may apply to a specific patient. It is not intended to be medical advice or a substitute for the medical advice, diagnosis, or treatment of a health care provider based on the health care provider's examination and assessment of a patient's specific and unique circumstances. Patients must speak with a health care provider for complete information about their health, medical questions, and treatment options, including any risks or benefits regarding use of medications. This information does not endorse any treatments or medications as safe, effective, or approved for treating a specific patient. UpToDate, Inc. and its affiliates disclaim any warranty or liability relating to this information or the use thereof.The use of this information is governed by the Terms of Use, available at https://www.woltersSatispayuwer.com/en/know/clinical-effectiveness-terms. 2024© UpToDate, Inc. and its affiliates and/or licensors. All  "rights reserved.  Copyright   © 2024 UpToDate, Inc. and/or its affiliates. All rights reserved.    Patient Education     Pelvic floor muscle exercises   The Basics   Written by the doctors and editors at [employer]   What is the pelvic floor? -- The \"pelvic floor\" is the name for the muscles that support the organs in the pelvis. These organs include the bladder and rectum. In the female pelvis, they also include the uterus (figure 1).  What are pelvic floor muscle exercises? -- These are exercises that can make your pelvic floor muscles stronger. They involve learning ways to tighten and relax specific muscles.  Pelvic floor muscle exercises can help keep you from leaking urine, gas, or bowel movements. They can also help with a condition called \"pelvic organ prolapse.\" This is when the organs in the lower belly drop down and press against or bulge into the vagina.  One way to strengthen your pelvic floor muscles is to do exercises. These are also known as \"Kegel\" exercises.  How do I do pelvic floor muscle exercises? -- If you want to try pelvic floor muscle exercises, start by talking to your doctor or nurse. They can talk to you about whether these exercises can help you. They can also teach you how to do them correctly.  You will need to learn which muscles to tighten and relax. It is sometimes hard to figure out the right muscles.  Some ways you can practice:   People with female or male anatomy - Squeeze the muscles you would use to avoid passing gas.   People with female anatomy - Put a finger inside your vagina and squeeze the muscles around your finger. Or you can imagine that you are sitting on a marble and have to pick it up using your vagina.   People with male anatomy - Squeeze the muscles that control the flow of urine. These exercises might help reduce urine leaks in people who have had surgery to treat prostate cancer or an enlarged prostate.  No matter how you learn to do pelvic floor muscle " "exercises, know that the muscles involved are not in your belly, thighs, or buttocks.  After you learn which muscles to tighten and relax, you can do the exercises in any position (standing, sitting, or lying down).  Should I see a physical therapist? -- Your doctor or nurse might suggest working with a physical therapist who has special training in pelvic floor issues. They can check your muscle strength and teach you specific exercises.  How often should I do the exercises? -- A common approach is to try to do a set of the exercises 3 times a day.  For each set, do the following about 10 times:   Squeeze your pelvic muscles.   Hold the muscles tight for about 10 seconds.   Relax the muscles completely.  Keep up this routine for at least a few months. You will probably notice results, but it might take a few weeks to several months.  How do pelvic floor muscle exercises help? -- Pelvic floor muscle exercises can help:   Prevent urine leaks in people who have \"stress incontinence\" - This means that they leak urine when they cough, laugh, sneeze, or strain.   Control sudden urges to urinate - These happen to people with \"urinary urgency\" or \"urge incontinence.\"   Control the release of gas or bowel movements   Improve symptoms caused by pelvic organ prolapse - These can include pressure or bulging in the vagina. If you have these symptoms, see your doctor or nurse to find out the cause.  It might take a few months of doing the exercises regularly before you notice them working. If you have been doing pelvic floor muscle exercises for several months and they don't seem to be making a difference, tell your doctor or nurse. They might suggest seeing a physical therapist or trying other treatments for your condition.  All topics are updated as new evidence becomes available and our peer review process is complete.  This topic retrieved from [employer] on: Feb 26, 2024.  Topic 92219 Version 15.0  Release: 32.2.4 - " "C32.56  © 2024 [employer]. and/or its affiliates. All rights reserved.  figure 1: Pelvic floor muscles     The \"pelvic floor\" is a group of muscles that support the organs in the pelvis. In females, these organs include the uterus, bladder, and rectum.  Graphic 212257 Version 2.0  Consumer Information Use and Disclaimer   Disclaimer: This generalized information is a limited summary of diagnosis, treatment, and/or medication information. It is not meant to be comprehensive and should be used as a tool to help the user understand and/or assess potential diagnostic and treatment options. It does NOT include all information about conditions, treatments, medications, side effects, or risks that may apply to a specific patient. It is not intended to be medical advice or a substitute for the medical advice, diagnosis, or treatment of a health care provider based on the health care provider's examination and assessment of a patient's specific and unique circumstances. Patients must speak with a health care provider for complete information about their health, medical questions, and treatment options, including any risks or benefits regarding use of medications. This information does not endorse any treatments or medications as safe, effective, or approved for treating a specific patient. [employer] and its affiliates disclaim any warranty or liability relating to this information or the use thereof.The use of this information is governed by the Terms of Use, available at https://www.[employer].com/en/know/clinical-effectiveness-terms. 2024© [employer] and its affiliates and/or licensors. All rights reserved.  Copyright   © 2024 [employer] and/or its affiliates. All rights reserved.    "

## 2024-11-15 NOTE — PROGRESS NOTES
Name: Yamini Ovalles      : 1955      MRN: [id number]  Encounter Provider: Mine Phelan MD  Encounter Date: 11/15/2024   Encounter department: [facility]    Assessment & Plan  Medicare annual wellness visit, subsequent         Primary hypertension  Doing well on current meds    Orders:    Comprehensive metabolic panel    Lipid panel    Pure hypercholesterolemia    Orders:    CBC and differential    Comprehensive metabolic panel    Lipid panel    rosuvastatin (CRESTOR) 10 MG tablet; Take 1 tablet (10 mg total) by mouth daily    Prediabetes  Recheck labs   Orders:    Hemoglobin A1C    Female stress incontinence  Recommended PT which she will think about             Preventive health issues were discussed with patient, and age appropriate screening tests were ordered as noted in patient's After Visit Summary. Personalized health advice and appropriate referrals for health education or preventive services given if needed, as noted in patient's After Visit Summary.    History of Present Illness     Here for follow up and medicare wellness    Hypertension  This is a chronic problem. The current episode started more than 1 year ago. The problem has been waxing and waning since onset. The problem is controlled. Pertinent negatives include no anxiety, blurred vision, chest pain, headaches, malaise/fatigue, neck pain, orthopnea, palpitations, peripheral edema, PND, shortness of breath or sweats. Risk factors for coronary artery disease include obesity. Past treatments include calcium channel blockers. There are no compliance problems.  There is no history of angina, kidney disease, CAD/MI, CVA, heart failure, left ventricular hypertrophy, PVD or retinopathy. There is no history of chronic renal disease, coarctation of the aorta, hyperaldosteronism, hypercortisolism, hyperparathyroidism, a hypertension causing med, pheochromocytoma, renovascular disease, sleep apnea or a thyroid problem.       Patient Care Team:  Mine Phelan MD as PCP - General (Family Medicine)    Review of Systems   Constitutional: Negative.  Negative for chills, fever and malaise/fatigue.   HENT: Negative.  Negative for ear pain and sore throat.    Eyes: Negative.  Negative for blurred vision, pain and visual disturbance.   Respiratory: Negative.  Negative for cough and shortness of breath.    Cardiovascular: Negative.  Negative for chest pain, palpitations, orthopnea and PND.   Gastrointestinal: Negative.  Negative for abdominal pain and vomiting.   Endocrine: Negative.    Genitourinary: Negative.  Negative for dysuria and hematuria.   Musculoskeletal: Negative.  Negative for arthralgias, back pain and neck pain.   Skin:  Negative for color change and rash.   Allergic/Immunologic: Negative.    Neurological: Negative.  Negative for seizures, syncope and headaches.   Hematological: Negative.    Psychiatric/Behavioral: Negative.     All other systems reviewed and are negative.    Medical History Reviewed by provider this encounter:  Tobacco  Allergies  Meds  Problems  Med Hx  Surg Hx  Fam Hx       Annual Wellness Visit Questionnaire   Yamini is here for her Subsequent Wellness visit.     Health Risk Assessment:   Patient rates overall health as very good. Patient feels that their physical health rating is same. Patient is satisfied with their life. Eyesight was rated as same. Hearing was rated as same. Patient feels that their emotional and mental health rating is same. Patients states they are sometimes angry. Patient states they are sometimes unusually tired/fatigued. Pain experienced in the last 7 days has been none. Patient states that she has experienced no weight loss or gain in last 6 months. Trying to lose weight but nothing seems to work.    Depression Screening:   PHQ-2 Score: 2      Fall Risk Screening:   In the past year, patient has experienced: no history of falling in past year      Urinary Incontinence  Screening:   Patient has leaked urine accidently in the last six months. When I cough or sneeze    Home Safety:  Patient does not have trouble with stairs inside or outside of their home. Patient has working smoke alarms and has working carbon monoxide detector. Home safety hazards include: none.     Nutrition:   Current diet is Regular, No Added Salt and Limited junk food.     Medications:   Patient is currently taking over-the-counter supplements. OTC medications include: see medication list. Patient is able to manage medications.     Activities of Daily Living (ADLs)/Instrumental Activities of Daily Living (IADLs):   Walk and transfer into and out of bed and chair?: Yes  Dress and groom yourself?: Yes    Bathe or shower yourself?: Yes    Feed yourself? Yes  Do your laundry/housekeeping?: Yes  Manage your money, pay your bills and track your expenses?: Yes  Make your own meals?: Yes    Do your own shopping?: Yes    Previous Hospitalizations:   Any hospitalizations or ED visits within the last 12 months?: No      Advance Care Planning:   Living will: Yes    Durable POA for healthcare: Yes    Advanced directive: Yes      PREVENTIVE SCREENINGS      Cardiovascular Screening:    General: Screening Not Indicated and History Lipid Disorder      Diabetes Screening:     General: Screening Current      Colorectal Cancer Screening:     General: Screening Current      Breast Cancer Screening:     General: Screening Current      Cervical Cancer Screening:    General: Screening Not Indicated      Lung Cancer Screening:     General: Screening Not Indicated      Hepatitis C Screening:    General: Screening Current    Screening, Brief Intervention, and Referral to Treatment (SBIRT)    Screening  Typical number of drinks in a day: 0  Typical number of drinks in a week: 0  Interpretation: Low risk drinking behavior.    AUDIT-C Screenin) How often did you have a drink containing alcohol in the past year? monthly or less  2) How  "many drinks did you have on a typical day when you were drinking in the past year? 0  3) How often did you have 6 or more drinks on one occasion in the past year? never    AUDIT-C Score: 1  Interpretation: Score 0-2 (female): Negative screen for alcohol misuse    Single Item Drug Screening:  How often have you used an illegal drug (including marijuana) or a prescription medication for non-medical reasons in the past year? never    Single Item Drug Screen Score: 0  Interpretation: Negative screen for possible drug use disorder    Social Drivers of Health     Financial Resource Strain: Low Risk  (11/3/2023)    Overall Financial Resource Strain (CARDIA)     Difficulty of Paying Living Expenses: Not hard at all   Food Insecurity: No Food Insecurity (11/15/2024)    Hunger Vital Sign     Worried About Running Out of Food in the Last Year: Never true     Ran Out of Food in the Last Year: Never true   Transportation Needs: No Transportation Needs (11/15/2024)    PRAPARE - Transportation     Lack of Transportation (Medical): No     Lack of Transportation (Non-Medical): No   Housing Stability: Low Risk  (11/15/2024)    Housing Stability Vital Sign     Unable to Pay for Housing in the Last Year: No     Number of Times Moved in the Last Year: 1     Homeless in the Last Year: No   Utilities: Not At Risk (11/15/2024)    [facility] Utilities     Threatened with loss of utilities: No     No results found.    Objective   /80 (BP Location: Left arm, Patient Position: Sitting, Cuff Size: Standard)   Pulse 76   Temp 97.6 °F (36.4 °C) (Tympanic)   Resp 17   Ht 5' 1.89\" (1.572 m)   Wt 75.8 kg (167 lb)   SpO2 98%   BMI 30.65 kg/m²     Physical Exam  Vitals and nursing note reviewed.   Constitutional:       General: She is not in acute distress.     Appearance: She is well-developed.   HENT:      Head: Normocephalic and atraumatic.      Left Ear: Tympanic membrane normal.      Nose: Nose normal.      Mouth/Throat:      Mouth: Mucous " membranes are moist.   Eyes:      Conjunctiva/sclera: Conjunctivae normal.   Cardiovascular:      Rate and Rhythm: Normal rate and regular rhythm.      Heart sounds: No murmur heard.  Pulmonary:      Effort: Pulmonary effort is normal. No respiratory distress.      Breath sounds: Normal breath sounds.   Abdominal:      Palpations: Abdomen is soft.      Tenderness: There is no abdominal tenderness.   Musculoskeletal:         General: No swelling.      Cervical back: Normal range of motion and neck supple.   Skin:     General: Skin is warm and dry.      Capillary Refill: Capillary refill takes less than 2 seconds.   Neurological:      General: No focal deficit present.      Mental Status: She is alert and oriented to person, place, and time.   Psychiatric:         Mood and Affect: Mood normal.

## 2025-03-05 DIAGNOSIS — I10 PRIMARY HYPERTENSION: ICD-10-CM

## 2025-03-06 RX ORDER — AMLODIPINE BESYLATE 2.5 MG/1
2.5 TABLET ORAL DAILY
Qty: 90 TABLET | Refills: 1 | Status: SHIPPED | OUTPATIENT
Start: 2025-03-06

## 2025-05-07 DIAGNOSIS — I10 PRIMARY HYPERTENSION: ICD-10-CM

## 2025-05-07 RX ORDER — OLMESARTAN MEDOXOMIL 20 MG/1
20 TABLET ORAL DAILY
Qty: 90 TABLET | Refills: 1 | Status: SHIPPED | OUTPATIENT
Start: 2025-05-07

## 2025-05-14 ENCOUNTER — APPOINTMENT (OUTPATIENT)
Dept: LAB | Facility: CLINIC | Age: 70
End: 2025-05-14
Payer: MEDICARE

## 2025-05-14 LAB
ALBUMIN SERPL BCG-MCNC: 4.6 G/DL (ref 3.5–5)
ALP SERPL-CCNC: 112 U/L (ref 34–104)
ALT SERPL W P-5'-P-CCNC: 40 U/L (ref 7–52)
ANION GAP SERPL CALCULATED.3IONS-SCNC: 11 MMOL/L (ref 4–13)
AST SERPL W P-5'-P-CCNC: 36 U/L (ref 13–39)
BASOPHILS # BLD AUTO: 0.04 THOUSANDS/ÂΜL (ref 0–0.1)
BASOPHILS NFR BLD AUTO: 1 % (ref 0–1)
BILIRUB SERPL-MCNC: 0.85 MG/DL (ref 0.2–1)
BUN SERPL-MCNC: 19 MG/DL (ref 5–25)
CALCIUM SERPL-MCNC: 9.6 MG/DL (ref 8.4–10.2)
CHLORIDE SERPL-SCNC: 101 MMOL/L (ref 96–108)
CHOLEST SERPL-MCNC: 169 MG/DL (ref ?–200)
CO2 SERPL-SCNC: 25 MMOL/L (ref 21–32)
CREAT SERPL-MCNC: 0.98 MG/DL (ref 0.6–1.3)
EOSINOPHIL # BLD AUTO: 0.13 THOUSAND/ÂΜL (ref 0–0.61)
EOSINOPHIL NFR BLD AUTO: 2 % (ref 0–6)
ERYTHROCYTE [DISTWIDTH] IN BLOOD BY AUTOMATED COUNT: 12.1 % (ref 11.6–15.1)
EST. AVERAGE GLUCOSE BLD GHB EST-MCNC: 120 MG/DL
GFR SERPL CREATININE-BSD FRML MDRD: 59 ML/MIN/1.73SQ M
GLUCOSE P FAST SERPL-MCNC: 102 MG/DL (ref 65–99)
HBA1C MFR BLD: 5.8 %
HCT VFR BLD AUTO: 47.3 % (ref 34.8–46.1)
HDLC SERPL-MCNC: 59 MG/DL
HGB BLD-MCNC: 15.8 G/DL (ref 11.5–15.4)
IMM GRANULOCYTES # BLD AUTO: 0.02 THOUSAND/UL (ref 0–0.2)
IMM GRANULOCYTES NFR BLD AUTO: 0 % (ref 0–2)
LDLC SERPL CALC-MCNC: 80 MG/DL (ref 0–100)
LYMPHOCYTES # BLD AUTO: 2.38 THOUSANDS/ÂΜL (ref 0.6–4.47)
LYMPHOCYTES NFR BLD AUTO: 41 % (ref 14–44)
MCH RBC QN AUTO: 30 PG (ref 26.8–34.3)
MCHC RBC AUTO-ENTMCNC: 33.4 G/DL (ref 31.4–37.4)
MCV RBC AUTO: 90 FL (ref 82–98)
MONOCYTES # BLD AUTO: 0.4 THOUSAND/ÂΜL (ref 0.17–1.22)
MONOCYTES NFR BLD AUTO: 7 % (ref 4–12)
NEUTROPHILS # BLD AUTO: 2.78 THOUSANDS/ÂΜL (ref 1.85–7.62)
NEUTS SEG NFR BLD AUTO: 49 % (ref 43–75)
NONHDLC SERPL-MCNC: 110 MG/DL
NRBC BLD AUTO-RTO: 0 /100 WBCS
PLATELET # BLD AUTO: 276 THOUSANDS/UL (ref 149–390)
PMV BLD AUTO: 9.8 FL (ref 8.9–12.7)
POTASSIUM SERPL-SCNC: 4.1 MMOL/L (ref 3.5–5.3)
PROT SERPL-MCNC: 7.4 G/DL (ref 6.4–8.4)
RBC # BLD AUTO: 5.26 MILLION/UL (ref 3.81–5.12)
SODIUM SERPL-SCNC: 137 MMOL/L (ref 135–147)
TRIGL SERPL-MCNC: 150 MG/DL (ref ?–150)
WBC # BLD AUTO: 5.75 THOUSAND/UL (ref 4.31–10.16)

## 2025-05-16 ENCOUNTER — OFFICE VISIT (OUTPATIENT)
Dept: FAMILY MEDICINE CLINIC | Facility: CLINIC | Age: 70
End: 2025-05-16
Payer: MEDICARE

## 2025-05-16 ENCOUNTER — RESULTS FOLLOW-UP (OUTPATIENT)
Dept: FAMILY MEDICINE CLINIC | Facility: CLINIC | Age: 70
End: 2025-05-16

## 2025-05-16 VITALS
WEIGHT: 169.8 LBS | BODY MASS INDEX: 31.25 KG/M2 | DIASTOLIC BLOOD PRESSURE: 70 MMHG | OXYGEN SATURATION: 98 % | SYSTOLIC BLOOD PRESSURE: 110 MMHG | HEIGHT: 62 IN | TEMPERATURE: 97.4 F | HEART RATE: 72 BPM | RESPIRATION RATE: 17 BRPM

## 2025-05-16 DIAGNOSIS — R73.03 PREDIABETES: ICD-10-CM

## 2025-05-16 DIAGNOSIS — I10 PRIMARY HYPERTENSION: ICD-10-CM

## 2025-05-16 DIAGNOSIS — F41.9 ANXIETY: ICD-10-CM

## 2025-05-16 DIAGNOSIS — E78.00 PURE HYPERCHOLESTEROLEMIA: ICD-10-CM

## 2025-05-16 DIAGNOSIS — M85.80 OSTEOPENIA, UNSPECIFIED LOCATION: Primary | ICD-10-CM

## 2025-05-16 PROCEDURE — 99214 OFFICE O/P EST MOD 30 MIN: CPT | Performed by: FAMILY MEDICINE

## 2025-05-16 PROCEDURE — G2211 COMPLEX E/M VISIT ADD ON: HCPCS | Performed by: FAMILY MEDICINE

## 2025-05-16 RX ORDER — PYRIDOXINE HCL (VITAMIN B6) 50 MG
TABLET ORAL
COMMUNITY

## 2025-05-16 NOTE — ASSESSMENT & PLAN NOTE
Stable on current meds   Continue as directed  Reviewed labs today  Orders:  •  Comprehensive metabolic panel  •  CBC and differential  •  Lipid panel

## 2025-05-16 NOTE — PROGRESS NOTES
"Name: Yamini Ovalles      : 1955      MRN: [id number]  Encounter Provider: Mine Phelan MD  Encounter Date: 2025   Encounter department: [facility]  :  Assessment & Plan  Osteopenia, unspecified location  Last dexa scan showed osteopenia on 2024  Continue weight bearing exercises, calcium and vitamin D   Due for dexa scan 2026         Primary hypertension  Stable on current meds   Continue as directed  Reviewed labs today  Orders:  •  Comprehensive metabolic panel  •  CBC and differential  •  Lipid panel    Anxiety  Coping well   Continue to monitor       Pure hypercholesterolemia  On crestor   Orders:  •  Comprehensive metabolic panel  •  CBC and differential  •  Lipid panel    Prediabetes    Orders:  •  Hemoglobin A1C           History of Present Illness   HPI  Here for follow up  Feels well overall    Review of Systems   Constitutional: Negative.    HENT: Negative.     Respiratory: Negative.     Cardiovascular: Negative.    Genitourinary: Negative.    Musculoskeletal: Negative.    Neurological: Negative.    Hematological: Negative.    Psychiatric/Behavioral: Negative.         Objective   /70 (BP Location: Left arm, Patient Position: Sitting, Cuff Size: Standard)   Pulse 72   Temp (!) 97.4 °F (36.3 °C) (Tympanic)   Resp 17   Ht 5' 1.89\" (1.572 m)   Wt 77 kg (169 lb 12.8 oz)   SpO2 98%   BMI 31.17 kg/m²      Physical Exam  Vitals and nursing note reviewed.   Constitutional:       Appearance: Normal appearance.   HENT:      Nose: Nose normal.     Eyes:      Extraocular Movements: Extraocular movements intact.      Pupils: Pupils are equal, round, and reactive to light.       Cardiovascular:      Rate and Rhythm: Normal rate and regular rhythm.      Pulses: Normal pulses.      Heart sounds: Normal heart sounds.     Neurological:      General: No focal deficit present.      Mental Status: She is alert and oriented to person, place, and time.     Psychiatric:    " Order for Durable Medical Equipment was processed and equipment ordered.     DME provider: [facility]    Date Faxed: 1/10/19    Ordering Provider: Dr. Noonan    Equipment ordered: APAP MACHINE    Fax Number: In house DME/[facility]            Mood and Affect: Mood normal.         Behavior: Behavior normal.

## 2025-05-16 NOTE — ASSESSMENT & PLAN NOTE
Last dexa scan showed osteopenia on 8/2024  Continue weight bearing exercises, calcium and vitamin D   Due for dexa scan 8/2026